# Patient Record
Sex: FEMALE | Race: ASIAN | NOT HISPANIC OR LATINO | Employment: UNEMPLOYED | URBAN - METROPOLITAN AREA
[De-identification: names, ages, dates, MRNs, and addresses within clinical notes are randomized per-mention and may not be internally consistent; named-entity substitution may affect disease eponyms.]

---

## 2018-05-07 ENCOUNTER — TRANSFERRED RECORDS (OUTPATIENT)
Dept: HEALTH INFORMATION MANAGEMENT | Facility: CLINIC | Age: 38
End: 2018-05-07

## 2018-10-10 ENCOUNTER — TRANSFERRED RECORDS (OUTPATIENT)
Dept: HEALTH INFORMATION MANAGEMENT | Facility: CLINIC | Age: 38
End: 2018-10-10

## 2018-10-31 ENCOUNTER — HEALTH MAINTENANCE LETTER (OUTPATIENT)
Age: 38
End: 2018-10-31

## 2018-10-31 ENCOUNTER — OFFICE VISIT (OUTPATIENT)
Dept: FAMILY MEDICINE | Facility: CLINIC | Age: 38
End: 2018-10-31
Payer: COMMERCIAL

## 2018-10-31 VITALS
BODY MASS INDEX: 31.72 KG/M2 | HEART RATE: 66 BPM | OXYGEN SATURATION: 99 % | HEIGHT: 64 IN | WEIGHT: 185.8 LBS | TEMPERATURE: 97.6 F | DIASTOLIC BLOOD PRESSURE: 79 MMHG | SYSTOLIC BLOOD PRESSURE: 118 MMHG | RESPIRATION RATE: 20 BRPM

## 2018-10-31 DIAGNOSIS — Z3A.08 8 WEEKS GESTATION OF PREGNANCY: Primary | ICD-10-CM

## 2018-10-31 DIAGNOSIS — G43.709 CHRONIC MIGRAINE WITHOUT AURA WITHOUT STATUS MIGRAINOSUS, NOT INTRACTABLE: Chronic | ICD-10-CM

## 2018-10-31 DIAGNOSIS — O09.521 ELDERLY MULTIGRAVIDA IN FIRST TRIMESTER: ICD-10-CM

## 2018-10-31 DIAGNOSIS — H54.61 DECREASED VISION OF RIGHT EYE: Chronic | ICD-10-CM

## 2018-10-31 LAB — HCG UR QL: POSITIVE

## 2018-10-31 RX ORDER — PRENATAL VIT/IRON FUM/FOLIC AC 27MG-0.8MG
1 TABLET ORAL DAILY
Qty: 100 TABLET | Refills: 3 | Status: SHIPPED | OUTPATIENT
Start: 2018-10-31 | End: 2018-11-21

## 2018-10-31 NOTE — PROGRESS NOTES
"       MARKO Ward Say is a 38 year old female with a significant past medical history of migraine who presents for the new concern(s) of:    Pregnancy Test, New to Clinic/Establish Care:   \"I am feeling like I am pregnant. I had no period this month, and I am very tired and nauseous. This feels like the last time I was pregnant.\" She is sexually active with a male. She does not use any birth control. Back in [place] took pills and shot, but was bleeding too much so since coming [place], she has not used any contraceptive. She is not trying to get pregnant, but states that she knew it was a possibility and is willing to keep the pregnancy if pregnant.     ROS: Denies vaginal bleeding, pelvic or abdominal pain, or excessive nausea/vomiting precluding oral intake.     She is unsure of the FDLMP, but guesses that her last menstruation took place over the first week of September.   Best guess is that FDLMP was 8.5 weeks ago, which might put her at 5 weeks gestation.     Obstetric:  one spontaneous  at 2 months \"because I was lifting too hard\".   Oldest child is 12 years, youngest is 5 years. All deliveries were . No . One labor took a long time, but no deliveries required vacuum- or surgical assistance. Oldest baby had in a [facility], and next three babies[place], all with very little/no anesthesia. No complications with any of her pregnancies that went to term and no post-partum complications.     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     SocH:  No tobacco, no alcohol.     FamH:   4 children are alive and well.     All:  None    Meds:   Medications for migraine 1 everyday and 1 as needed.     She completed EREN so we could learn her medical history and medication list from the [facility].     A Nia  was used for this visit.       Patient Active Problem List   Diagnosis " "    Chronic migraine without aura without status migrainosus, not intractable     Decreased vision of right eye     Elderly multigravida in first trimester     Current Outpatient Prescriptions   Medication Sig Dispense Refill     Prenatal Vit-Fe Fumarate-FA (PRENATAL MULTIVITAMIN PLUS IRON) 27-0.8 MG TABS per tablet Take 1 tablet by mouth daily 100 tablet 3      No Known Allergies    Results for orders placed or performed in visit on 10/31/18 (from the past 24 hour(s))   HCG Qualitative Urine (UPT)  ([facility])   Result Value Ref Range    HCG Qual Urine POSITIVE Negative          Review of Systems:   CONSTITUTIONAL: fatigue, no unexpected change in weight  SKIN: no worrisome rashes, no worrisome lesions  EYES: no acute vision problems or changes  ENT: no ear problems, no mouth problems, no throat problems  RESP: no significant cough, no shortness of breath  CV: no chest pain, no palpitations, no new or worsening peripheral edema  GI: nausea, no vomiting, no constipation, no diarrhea, occasional midline ache similar to prior pregnancies  : no frequency, no dysuria, no hematuria  NEURO: no weakness, no dizziness, no syncope, no recent migraines  ENDOCRINE: no temperature intolerance, no skin/hair changes  PSYCHIATRIC: NEGATIVE for changes in mood or affect            Physical Exam:     Vitals:    10/31/18 1507   BP: 118/79   BP Location: Left arm   Patient Position: Sitting   Cuff Size: Adult Regular   Pulse: 66   Resp: 20   Temp: 97.6  F (36.4  C)   TempSrc: Oral   SpO2: 99%   Weight: 185 lb 12.8 oz (84.3 kg)   Height: 5' 3.5\" (161.3 cm)     Body mass index is 32.4 kg/(m^2).    GENERAL: healthy, alert and no distress  EYES: Eyes grossly normal to inspection, no icterus  RESP: lungs clear to auscultation - no rales, no rhonchi, no wheezes  CV: regular rates and rhythm, normal S1 S2, no S3 or S4 and no murmur, no click or rub -  ABDOMEN: soft, no tenderness, no  hepatosplenomegaly, no masses, normal bowel sounds  MS: " extremities- no gross deformities noted, no edema  SKIN: no suspicious lesions, no rashes  NEURO: strength and tone- normal, sensory exam- grossly normal, mentation- intact, speech- normal, reflexes- symmetric  BACK: no CVA tenderness, no paralumbar tenderness    Assessment and Plan      Ed was seen today for establish care, pregnancy test, medication reconciliation and anxiety.    Diagnoses and all orders for this visit:    8 weeks gestation of pregnancy  -     HCG Qualitative Urine (UPT)  ([facility])  -     Prenatal Vit-Fe Fumarate-FA (PRENATAL MULTIVITAMIN PLUS IRON) 27-0.8 MG TABS per tablet; Take 1 tablet by mouth daily    Elderly multigravida in first trimester    Chronic migraine without aura without status migrainosus, not intractable    Decreased vision of right eye      Pregnant without acute complications: denies bleeding, pain, nausea/vomiting precluding intake. Feeling otherwise well.     FDLMP very uncertain. Anticipate TVUS, consider quantitative beta hCG to make sure TVUS is not too early.     Advanced maternal age. Anticipate quad screen discussion.     Awaiting EREN from [facility] to advise Ed Say on if her migraine medications are okay in pregnancy. Told her not to take naproxen or ibuprofen but Tylenol is okay. She will send pictures to pills to , who will inform the clinic.     Return to clinic in 1 week for 1st OB visit.     Options for treatment and follow-up care were reviewed with the patient and/or guardian. Moo Say and/or guardian engaged in the decision making process and verbalized understanding of the options discussed and agreed with the final plan.  This patient was seen and discussed with supervising physician Dr. Darryl Daniels.   Moreno Mckeon DO

## 2018-10-31 NOTE — MR AVS SNAPSHOT
"              After Visit Summary   10/31/2018    Moo Say    MRN: [id number]           Patient Information     Date Of Birth          1980        Visit Information        Provider Department      10/31/2018 3:10 PM Moreno Mckeon MD [facility]        Today's Diagnoses     8 weeks gestation of pregnancy    -  1       Follow-ups after your visit        Follow-up notes from your care team     Return in about 1 week (around 2018) for 1st ob visit.      Who to contact     Please call your clinic at [phone] to:    Ask questions about your health    Make or cancel appointments    Discuss your medicines    Learn about your test results    Speak to your doctor            Additional Information About Your Visit        MyChart Information     [employer] is an electronic gateway that provides easy, online access to your medical records. With Bahu, you can request a clinic appointment, read your test results, renew a prescription or communicate with your care team.     To sign up for [employer] visit the website at [email or web address]   You will be asked to enter the access code listed below, as well as some personal information. Please follow the directions to create your username and password.     Your access code is: [email or web address]  Expires: 2019  3:46 PM     Your access code will  in 90 days. If you need help or a new code, please contact your [facility] or call [phone] for assistance.        Care EveryWhere ID     This is your Care EveryWhere ID. This could be used by other organizations to access your [facility] medical records  [id number]        Your Vitals Were     Pulse Temperature Respirations Height Last Period Pulse Oximetry    66 97.6  F (36.4  C) (Oral) 20 5' 3.5\" (161.3 cm) 2018 (Within Days) 99%    BMI (Body Mass Index)                   32.4 kg/m2            Blood Pressure from Last 3 Encounters:   10/31/18 118/79    Weight from " Last 3 Encounters:   10/31/18 185 lb 12.8 oz (84.3 kg)              We Performed the Following     HCG Qualitative Urine (UPT)  ([facility])          Today's Medication Changes          These changes are accurate as of 10/31/18  3:46 PM.  If you have any questions, ask your nurse or doctor.               Start taking these medicines.        Dose/Directions    prenatal multivitamin plus iron 27-0.8 MG Tabs per tablet   Used for:  8 weeks gestation of pregnancy   Started by:  Moreno Mckeon MD        Dose:  1 tablet   Take 1 tablet by mouth daily   Quantity:  100 tablet   Refills:  3            Where to get your medicines      [facility] - [place], [place] - [address], [place] [address]     Phone:  [phone]     prenatal multivitamin plus iron 27-0.8 MG Tabs per tablet                Primary Care Provider Office Phone #    Moreno Mckeon [phone]       [facility] [address] [place] [address]        Equal Access to Services     [facility]FF[facility]: Hadii tracy ku hadasho Soomaali, waaxda luqadaha, qaybta kaalmada adeegyada, waxay heikein sukh lomeli . So [facility] [phone].    ATENCIÓN: Si habla español, tiene a white disposición servicios gratuitos de asistencia lingüística. Llame al [phone].    We comply with applicable federal civil rights laws and [place] laws. We do not discriminate on the basis of race, color, national origin, age, disability, sex, sexual orientation, or gender identity.            Thank you!     Thank you for choosing [facility]  for your care. Our goal is always to provide you with excellent care. Hearing back from our patients is one way we can continue to improve our services. Please take a few minutes to complete the written survey that you may receive in the mail after your visit with us. Thank you!             Your Updated Medication List - Protect others around you:  Learn how to safely use, store and throw away your medicines at www.disposemymeds.org.          This list is accurate as of 10/31/18  3:46 PM.  Always use your most recent med list.                   Brand Name Dispense Instructions for use Diagnosis    prenatal multivitamin plus iron 27-0.8 MG Tabs per tablet     100 tablet    Take 1 tablet by mouth daily    8 weeks gestation of pregnancy

## 2018-10-31 NOTE — NURSING NOTE
Due to patient being non-English speaking/uses sign language, an  was used for this visit. Only for face-to-face interpretation by an external agency, date and length of interpretation can be found on the scanned worksheet.       name: Mau Verduzco  Language: Nia  Agency:  [employer]  Telephone number: [phone]  Type of interpretation:  Face-to-face, spoken

## 2018-11-01 NOTE — PROGRESS NOTES
Preceptor Attestation:   Patient seen, evaluated and discussed with the resident. I have verified the content of the note, which accurately reflects my assessment of the patient and the plan of care.   Supervising Physician:  Darryl Daniels MD

## 2018-11-13 ENCOUNTER — OFFICE VISIT (OUTPATIENT)
Dept: FAMILY MEDICINE | Facility: CLINIC | Age: 38
End: 2018-11-13
Payer: COMMERCIAL

## 2018-11-13 VITALS
TEMPERATURE: 98.9 F | OXYGEN SATURATION: 98 % | SYSTOLIC BLOOD PRESSURE: 112 MMHG | RESPIRATION RATE: 16 BRPM | DIASTOLIC BLOOD PRESSURE: 74 MMHG | HEART RATE: 66 BPM

## 2018-11-13 DIAGNOSIS — Z34.91 FIRST TRIMESTER PREGNANCY: ICD-10-CM

## 2018-11-13 DIAGNOSIS — G44.219 EPISODIC TENSION-TYPE HEADACHE, NOT INTRACTABLE: Primary | ICD-10-CM

## 2018-11-13 DIAGNOSIS — O21.9 NAUSEA/VOMITING IN PREGNANCY: ICD-10-CM

## 2018-11-13 PROBLEM — Q12.0: Status: ACTIVE | Noted: 2018-05-25

## 2018-11-13 PROBLEM — H44.511 ABSOLUTE GLAUCOMA, RIGHT EYE: Status: ACTIVE | Noted: 2018-05-25

## 2018-11-13 PROBLEM — Q11.2: Status: ACTIVE | Noted: 2018-05-25

## 2018-11-13 PROBLEM — J84.10 LUNG GRANULOMA (H): Status: ACTIVE | Noted: 2018-05-07

## 2018-11-13 PROBLEM — H54.40 BLIND RIGHT EYE: Status: ACTIVE | Noted: 2018-05-07

## 2018-11-13 RX ORDER — TIMOLOL MALEATE 5 MG/ML
1 SOLUTION/ DROPS OPHTHALMIC
COMMUNITY
Start: 2018-05-25 | End: 2019-06-06

## 2018-11-13 RX ORDER — ACETAMINOPHEN 325 MG/1
650 TABLET ORAL EVERY 6 HOURS PRN
Qty: 100 TABLET | Refills: 3 | Status: SHIPPED | OUTPATIENT
Start: 2018-11-13 | End: 2019-01-15

## 2018-11-13 RX ORDER — IBUPROFEN 600 MG/1
600 TABLET, FILM COATED ORAL
COMMUNITY
Start: 2018-09-13 | End: 2018-11-13

## 2018-11-13 RX ORDER — PYRIDOXINE HCL (VITAMIN B6) 25 MG
25 TABLET ORAL DAILY
Qty: 90 TABLET | Refills: 0 | Status: SHIPPED | OUTPATIENT
Start: 2018-11-13 | End: 2018-11-21

## 2018-11-13 RX ORDER — NORTRIPTYLINE HCL 10 MG
10 CAPSULE ORAL
COMMUNITY
Start: 2018-09-13 | End: 2018-11-13

## 2018-11-13 NOTE — NURSING NOTE
Due to patient being non-English speaking/uses sign language, an  was used for this visit. Only for face-to-face interpretation by an external agency, date and length of interpretation can be found on the scanned worksheet.     name:  Mau Verduzco  Agency: [employer]  Language: Nia  Telephone number: [phone]  Type of interpretation: Face-to-face, Spoken

## 2018-11-13 NOTE — PROGRESS NOTES
SUBJECTIVE     Chief Complaint   Patient presents with     Cough     Pt is here for a cough.  She states she has had it for two weeks now.     Medication Reconciliation     Complete.        Subjective: Ed Rangel is a 38 year old female with PMH including  who is approx 9w pregnant by unsure LMP here for forms, but forgot forms at home..    Coughing x 2 weeks, worse at night. No shortness of breath or chest pain. No fevers. +nasal congestion. No sick contacts. No diarrhea.     Does have constant headaches. Has been going on since she was a child, slowly getting worse over time. Usually every day or every other day. If doesn't take meds, lasts all day. Sleeping better. No weakness. No other herbal medications. Has been seen by [facility] (records reviewed) and was started on nortriptyline and ibuprofen, but hasn't taken these since she's been pregnant. +chronic R eye blindness, unchanged since the war.     Feels nausea and throwing up. This has been present since she's been pregnant. This has been present for her other pregnancies. Eating OK and drinking OK. Currently throwing up about 3x/day.       PMH/PSH, FamHx, Meds, Allergies reviewed and updated as needed.    Social History     Social History     Marital status:      Spouse name: N/A     Number of children: N/A     Years of education: N/A     Social History Main Topics     Smoking status: Never Smoker     Smokeless tobacco: Never Used     Alcohol use None     Drug use: None     Sexual activity: Not Asked     Other Topics Concern     None     Social History Narrative    Lived in [facility] [place]. Gave birth to first baby in [facility], gave birth [place] next [place] babies [place].                OBJECTIVE     /74 (BP Location: Left arm, Patient Position: Sitting, Cuff Size: Adult Large)  Pulse 66  Temp 98.9  F (37.2  C) (Oral)  Resp 16  LMP 2018 (Within Days)  SpO2 98%  There is no height or weight on file  to calculate BMI.    Physical exam:  Gen: Appears tired, coughing frequently  HEENT: No cervical lymphadenopathy. No tonsillar swelling, mild oropharyngeal erythema, no oral lesions or exudates.   CV: Regular rate and rhythm, no murmurs/rubs/gallops  Resp: Clear to auscultation bilaterally, no crackles or wheezes  Neuro: Symmetric smile. R eye is non-reactive to light, but L eye reacts appropriately to light. 5/5 strength to arm flexion/extension bilaterally. Sensation intact across distal extremities. Normal gait.  Skin: no suspicious lesions or rashes  Psych: Mood and affect appropriate, mentation appears normal.    No results found for this or any previous visit (from the past 24 hour(s)).      ASSESSMENT AND PLAN     Ed Rangel is a 38 year old female here for multiple concerns    1. Episodic tension-type headache, not intractable  Chronic. No neuro deficits. Counseled to discontinue ibuprofen and nortriptyline in pregnancy. Will try APAP as needed  - acetaminophen (TYLENOL) 325 MG tablet; Take 2 tablets (650 mg) by mouth every 6 hours as needed for mild pain  Dispense: 100 tablet; Refill: 3    2. Nausea/vomiting in pregnancy  - pyridOXINE (VITAMIN B-6) 25 MG tablet; Take 1 tablet (25 mg) by mouth daily  Dispense: 90 tablet; Refill: 0  - doxylamine (UNISOM) 25 MG TABS tablet; Take 0.5-1 tablets (12.5-25 mg) by mouth nightly as needed (nausea)  Dispense: 14 each; Refill: 0    3. First trimester pregnancy  Dating unclear so as she is approx 9 weeks along now will schedule US  - US OB <14 WKS SINGLE OR FIRST GESTATION; Future    4. Cough  Likely viral URI. Vitals stable with normal HEENT/lung exam. Recommended honey and symptomatic cares.      RTC in 1-2 weeks for NOIGNACIO Kaur MD, PGY-3  I precepted today with Beatriz Cee MD.

## 2018-11-13 NOTE — PROGRESS NOTES
Preceptor Attestation:   Patient seen, evaluated and discussed with the resident. I have verified the content of the note, which accurately reflects my assessment of the patient and the plan of care.   Supervising Physician:  Puja Cee MD

## 2018-11-13 NOTE — PATIENT INSTRUCTIONS
1. Episodic tension-type headache, not intractable  - acetaminophen (TYLENOL) 325 MG tablet; Take 2 tablets (650 mg) by mouth every 6 hours as needed for mild pain  Dispense: 100 tablet; Refill: 3    2. Nausea/vomiting in pregnancy  - pyridOXINE (VITAMIN B-6) 25 MG tablet; Take 1 tablet (25 mg) by mouth daily  Dispense: 90 tablet; Refill: 0  - doxylamine (UNISOM) 25 MG TABS tablet; Take 0.5-1 tablets (12.5-25 mg) by mouth nightly as needed (nausea)  Dispense: 14 each; Refill: 0    3. First trimester pregnancy  - US OB <14 WKS SINGLE OR FIRST GESTATION; Future

## 2018-11-13 NOTE — MR AVS SNAPSHOT
After Visit Summary   11/13/2018    Ed Say    MRN: [id number]           Patient Information     Date Of Birth          1980        Visit Information        Provider Department      11/13/2018 10:20 AM Beatriz Kaur MD [facility]        Today's Diagnoses     Episodic tension-type headache, not intractable    -  1    Nausea/vomiting in pregnancy        First trimester pregnancy          Care Instructions    1. Episodic tension-type headache, not intractable  - acetaminophen (TYLENOL) 325 MG tablet; Take 2 tablets (650 mg) by mouth every 6 hours as needed for mild pain  Dispense: 100 tablet; Refill: 3    2. Nausea/vomiting in pregnancy  - pyridOXINE (VITAMIN B-6) 25 MG tablet; Take 1 tablet (25 mg) by mouth daily  Dispense: 90 tablet; Refill: 0  - doxylamine (UNISOM) 25 MG TABS tablet; Take 0.5-1 tablets (12.5-25 mg) by mouth nightly as needed (nausea)  Dispense: 14 each; Refill: 0    3. First trimester pregnancy  - US OB <14 WKS SINGLE OR FIRST GESTATION; Future                Follow-ups after your visit        Your next 10 appointments already scheduled     Nov 21, 2018  8:40 AM CST   Education with [facility] Ob Educator   [facility] ([facility])    [address] [place] [id number] [phone]            Nov 21, 2018 10:00 AM CST   NEW OB with Azul Olivares MD   [facility] ([facility])    [address] [place] [id number]   [phone]              Future tests that were ordered for you today     Open Future Orders        Priority Expected Expires Ordered    US OB <14 WKS SINGLE OR FIRST GESTATION Routine  11/13/2019 11/13/2018            Who to contact     Please call your clinic at [phone] to:    Ask questions about your health    Make or cancel appointments    Discuss your medicines    Learn about your test results    Speak to your doctor            Additional Information About Your Visit        MyChart Information     [employer] is an electronic  gateway that provides easy, online access to your medical records. With [employer], you can request a clinic appointment, read your test results, renew a prescription or communicate with your care team.     To sign up for [employer] visit the website at [email or web address]   You will be asked to enter the access code listed below, as well as some personal information. Please follow the directions to create your username and password.     Your access code is: [email or web address]  Expires: 2019  2:46 PM     Your access code will  in 90 days. If you need help or a new code, please contact your [facility] or call [phone] for assistance.        Care EveryWhere ID     This is your Care EveryWhere ID. This could be used by other organizations to access your [facility] medical records  [id number]        Your Vitals Were     Pulse Temperature Respirations Last Period Pulse Oximetry       66 98.9  F (37.2  C) (Oral) 16 2018 (Within Days) 98%        Blood Pressure from Last 3 Encounters:   18 112/74   10/31/18 118/79    Weight from Last 3 Encounters:   10/31/18 185 lb 12.8 oz (84.3 kg)                 Today's Medication Changes          These changes are accurate as of 18 11:07 AM.  If you have any questions, ask your nurse or doctor.               Start taking these medicines.        Dose/Directions    acetaminophen 325 MG tablet   Commonly known as:  TYLENOL   Used for:  Episodic tension-type headache, not intractable   Started by:  Beatriz Kaur MD        Dose:  650 mg   Take 2 tablets (650 mg) by mouth every 6 hours as needed for mild pain   Quantity:  100 tablet   Refills:  3       doxylamine 25 MG Tabs tablet   Commonly known as:  UNISOM   Used for:  Nausea/vomiting in pregnancy   Started by:  Beatriz Kaur MD        Dose:  12.5-25 mg   Take 0.5-1 tablets (12.5-25 mg) by mouth nightly as needed (nausea)   Quantity:  14 each   Refills:  0        pyridOXINE 25 MG tablet   Commonly known as:  vitamin B-6   Used for:  Nausea/vomiting in pregnancy   Started by:  Beatriz Kaur MD        Dose:  25 mg   Take 1 tablet (25 mg) by mouth daily   Quantity:  90 tablet   Refills:  0         Stop taking these medicines if you haven't already. Please contact your care team if you have questions.     ibuprofen 600 MG tablet   Commonly known as:  ADVIL/MOTRIN   Stopped by:  Beatriz Kaur MD           nortriptyline 10 MG capsule   Commonly known as:  PAMELOR   Stopped by:  Beatriz Kaur MD                Where to get your medicines      These medications were sent to [facility] - [place], [place] - [address], [place] [address]     Phone:  [phone]     acetaminophen 325 MG tablet    doxylamine 25 MG Tabs tablet    pyridOXINE 25 MG tablet                Primary Care Provider Office Phone # Fax #    Azul Olivares [phone]       [address] [place] [address]        Equal Access to Services     [facility]: Hadii aad ku hadasho Soomaali, waaxda luqadaha, qaybta kaalmada adeegyada, waxay idiin haymaria rn suze lomeli . So [facility] [phone].    ATENCIÓN: Si habla español, tiene a white disposición servicios gratuitos de asistencia lingüística. [facility] [phone].    We comply with applicable federal civil rights laws and [place] laws. We do not discriminate on the basis of race, color, national origin, age, disability, sex, sexual orientation, or gender identity.            Thank you!     Thank you for choosing [facility]  for your care. Our goal is always to provide you with excellent care. Hearing back from our patients is one way we can continue to improve our services. Please take a few minutes to complete the written survey that you may receive in the mail after your visit with us. Thank you!             Your Updated Medication List - Protect others around you: Learn how to  safely use, store and throw away your medicines at www.disposemymeds.org.          This list is accurate as of 11/13/18 11:07 AM.  Always use your most recent med list.                   Brand Name Dispense Instructions for use Diagnosis    acetaminophen 325 MG tablet    TYLENOL    100 tablet    Take 2 tablets (650 mg) by mouth every 6 hours as needed for mild pain    Episodic tension-type headache, not intractable       doxylamine 25 MG Tabs tablet    UNISOM    14 each    Take 0.5-1 tablets (12.5-25 mg) by mouth nightly as needed (nausea)    Nausea/vomiting in pregnancy       prenatal multivitamin plus iron 27-0.8 MG Tabs per tablet     100 tablet    Take 1 tablet by mouth daily    8 weeks gestation of pregnancy       pyridOXINE 25 MG tablet    vitamin B-6    90 tablet    Take 1 tablet (25 mg) by mouth daily    Nausea/vomiting in pregnancy       timolol 0.5 % ophthalmic solution    TIMOPTIC     1 drop

## 2018-11-19 DIAGNOSIS — Z34.91 FIRST TRIMESTER PREGNANCY: ICD-10-CM

## 2018-11-19 NOTE — PROGRESS NOTES
Due to patient being non-English speaking/uses sign language, an  was used for this visit. Only for face-to-face interpretation by an external agency, date and length of interpretation can be found on the scanned worksheet.     name: Calvin Centeno  Agency: [employer]  Language: Nia   Telephone number: [phone]  Type of interpretation: Face-to-face, spoken

## 2018-11-21 ENCOUNTER — OFFICE VISIT (OUTPATIENT)
Dept: FAMILY MEDICINE | Facility: CLINIC | Age: 38
End: 2018-11-21
Payer: COMMERCIAL

## 2018-11-21 ENCOUNTER — ALLIED HEALTH/NURSE VISIT (OUTPATIENT)
Dept: FAMILY MEDICINE | Facility: CLINIC | Age: 38
End: 2018-11-21
Payer: COMMERCIAL

## 2018-11-21 VITALS
RESPIRATION RATE: 18 BRPM | TEMPERATURE: 97.8 F | SYSTOLIC BLOOD PRESSURE: 118 MMHG | DIASTOLIC BLOOD PRESSURE: 84 MMHG | HEART RATE: 69 BPM | HEIGHT: 64 IN | WEIGHT: 187.6 LBS | BODY MASS INDEX: 32.03 KG/M2

## 2018-11-21 DIAGNOSIS — O21.0 HYPEREMESIS GRAVIDARUM: ICD-10-CM

## 2018-11-21 DIAGNOSIS — O09.529 AMA (ADVANCED MATERNAL AGE) MULTIGRAVIDA 35+: ICD-10-CM

## 2018-11-21 DIAGNOSIS — O09.90 HIGH RISK PREGNANCY, ANTEPARTUM: Primary | ICD-10-CM

## 2018-11-21 DIAGNOSIS — O21.9 NAUSEA/VOMITING IN PREGNANCY: ICD-10-CM

## 2018-11-21 DIAGNOSIS — H54.7 DECREASED VISION: ICD-10-CM

## 2018-11-21 DIAGNOSIS — K03.6 BETEL DEPOSIT ON TEETH: ICD-10-CM

## 2018-11-21 DIAGNOSIS — O09.529 SUPERVISION OF HIGH-RISK PREGNANCY OF ELDERLY MULTIGRAVIDA: ICD-10-CM

## 2018-11-21 DIAGNOSIS — Z3A.08 8 WEEKS GESTATION OF PREGNANCY: ICD-10-CM

## 2018-11-21 DIAGNOSIS — R10.11 RUQ ABDOMINAL PAIN: ICD-10-CM

## 2018-11-21 DIAGNOSIS — Z60.3 LANGUAGE BARRIER, CULTURAL DIFFERENCES: ICD-10-CM

## 2018-11-21 LAB
ALBUMIN SERPL BCP-MCNC: 3.3 G/DL (ref 3.5–5)
ALP SERPL-CCNC: 62 U/L (ref 45–120)
ALT SERPL W/O P-5'-P-CCNC: 50 U/L (ref 0–45)
AST SERPL-CCNC: 32 U/L (ref 0–40)
BILIRUB DIRECT SERPL-MCNC: 0.1 MG/DL (ref 0–0.5)
BILIRUB SERPL-MCNC: 0.4 MG/DL (ref 0–1)
BILIRUBIN UR: NEGATIVE
BLOOD UR: NEGATIVE
COLLECTION METHOD: NORMAL
GLUCOSE URINE: NEGATIVE
HBA1C MFR BLD: 5.1 % (ref 4.1–5.7)
HCT VFR BLD AUTO: 41.9 % (ref 35–47)
HEMOGLOBIN: 13.1 G/DL (ref 11.7–15.7)
HIV 1+2 AB+HIV1 P24 AG SERPL QL IA: NEGATIVE
KETONES UR QL: NEGATIVE
LEAD BLD-MCNC: NORMAL UG/DL
LEAD RETEST: NO
LEUKOCYTE ESTERASE UR: NEGATIVE
MCH RBC QN AUTO: 31.4 PG (ref 26.5–35)
MCHC RBC AUTO-ENTMCNC: 31.3 G/DL (ref 32–36)
MCV RBC AUTO: 100.5 FL (ref 78–100)
NITRITE UR QL STRIP: NEGATIVE
PH UR STRIP: 6.5 [PH] (ref 5–7)
PLATELET # BLD AUTO: 266 K/UL (ref 150–450)
PROT SERPL-MCNC: 7.6 G/DL (ref 6–8)
PROTEIN UR: ABNORMAL
RBC # BLD AUTO: 4.2 M/UL (ref 3.8–5.2)
SP GR UR STRIP: >=1.03
UROBILINOGEN UR STRIP-ACNC: ABNORMAL
WBC # BLD AUTO: 11.4 K/UL (ref 4–11)

## 2018-11-21 RX ORDER — PYRIDOXINE HCL (VITAMIN B6) 25 MG
25 TABLET ORAL DAILY
Qty: 90 TABLET | Refills: 0 | Status: SHIPPED | OUTPATIENT
Start: 2018-11-21 | End: 2019-01-15

## 2018-11-21 RX ORDER — MECLIZINE HYDROCHLORIDE 25 MG/1
25 TABLET ORAL EVERY 6 HOURS PRN
Qty: 30 TABLET | Refills: 1 | Status: SHIPPED | OUTPATIENT
Start: 2018-11-21 | End: 2019-01-03

## 2018-11-21 RX ORDER — PRENATAL VIT/IRON FUM/FOLIC AC 27MG-0.8MG
1 TABLET ORAL DAILY
Qty: 100 TABLET | Refills: 3 | Status: SHIPPED | OUTPATIENT
Start: 2018-11-21 | End: 2019-02-04

## 2018-11-21 SDOH — SOCIAL STABILITY - SOCIAL INSECURITY: ACCULTURATION DIFFICULTY: Z60.3

## 2018-11-21 NOTE — PROGRESS NOTES
Preceptor Attestation:   Patient seen, evaluated and discussed with the resident. I have verified the content of the note, which accurately reflects my assessment of the patient and the plan of care.   Supervising Physician:  Brandan Moreno MD

## 2018-11-21 NOTE — MR AVS SNAPSHOT
After Visit Summary   2018    Ed Say    MRN: [id number]           Patient Information     Date Of Birth          1980        Visit Information        Provider Department      2018 8:40 AM Educator, Jorje [facility]        Today's Diagnoses     Supervision of high-risk pregnancy of elderly multigravida        AMA (advanced maternal age) multigravida 35+        Language barrier, cultural differences        Betel deposit on teeth        Hyperemesis gravidarum           Follow-ups after your visit        Your next 10 appointments already scheduled     Dec 05, 2018  9:20 AM CST   NEW OB with Azul Olivares MD   [facility] ([facility])    [address] [place] [address]   [phone]              Who to contact     Please call your clinic at [phone] to:    Ask questions about your health    Make or cancel appointments    Discuss your medicines    Learn about your test results    Speak to your doctor            Additional Information About Your Visit        MyChart Information     [employer] is an electronic gateway that provides easy, online access to your medical records. With [employer], you can request a clinic appointment, read your test results, renew a prescription or communicate with your care team.     To sign up for [employer] visit the website at [email or web address]   You will be asked to enter the access code listed below, as well as some personal information. Please follow the directions to create your username and password.     Your access code is: [email or web address]  Expires: 2019  2:46 PM     Your access code will  in 90 days. If you need help or a new code, please contact your [facility] or call [phone] for assistance.        Care EveryWhere ID     This is your Care EveryWhere ID. This could be used by other organizations to access your [facility] medical records  [id number]        Your Vitals Were      Last Period                   09/07/2018 (Within Days)            Blood Pressure from Last 3 Encounters:   11/21/18 118/84   11/13/18 112/74   10/31/18 118/79    Weight from Last 3 Encounters:   11/21/18 187 lb 9.6 oz (85.1 kg)   10/31/18 185 lb 12.8 oz (84.3 kg)              We Performed the Following     AT RISK ANTEPARTUM MANAGEMENT     CARE COORDINATION          Today's Medication Changes          These changes are accurate as of 11/21/18 11:59 PM.  If you have any questions, ask your nurse or doctor.               Start taking these medicines.        Dose/Directions    meclizine 25 MG tablet   Commonly known as:  ANTIVERT   Used for:  High risk pregnancy, antepartum   Started by:  Azul Olivares MD        Dose:  25 mg   Take 1 tablet (25 mg) by mouth every 6 hours as needed for dizziness For vomiting in pregnancy   Quantity:  30 tablet   Refills:  1            Where to get your medicines      These medications were sent to [facility] - [place], [place] - [address]  [address], [place] [address]     Phone:  [phone]     doxylamine 25 MG Tabs tablet    meclizine 25 MG tablet    prenatal multivitamin plus iron 27-0.8 MG Tabs per tablet    pyridOXINE 25 MG tablet                Primary Care Provider Office Phone # Fax #    Azul Olivares [phone]       [address] [place] [address]        Equal Access to Services     ALLEN GIFFORD AH: Hadii tracy uriarte hadasho Somery, waaxda luqadaha, qaybta kaalmada adeegyada, deepika narayan. So [facility] [phone].    ATENCIÓN: Si habla español, tiene a white disposición servicios gratuitos de asistencia lingüística. Therese al [phone].    We comply with applicable federal civil rights laws and [place] laws. We do not discriminate on the basis of race, color, national origin, age, disability, sex, sexual orientation, or gender identity.            Thank you!     Thank you for choosing  [facility]  for your care. Our goal is always to provide you with excellent care. Hearing back from our patients is one way we can continue to improve our services. Please take a few minutes to complete the written survey that you may receive in the mail after your visit with us. Thank you!             Your Updated Medication List - Protect others around you: Learn how to safely use, store and throw away your medicines at www.disposemymeds.org.          This list is accurate as of 11/21/18 11:59 PM.  Always use your most recent med list.                   Brand Name Dispense Instructions for use Diagnosis    acetaminophen 325 MG tablet    TYLENOL    100 tablet    Take 2 tablets (650 mg) by mouth every 6 hours as needed for mild pain    Episodic tension-type headache, not intractable       doxylamine 25 MG Tabs tablet    UNISOM    30 each    Take 0.5-1 tablets (12.5-25 mg) by mouth nightly as needed (nausea)    Nausea/vomiting in pregnancy       meclizine 25 MG tablet    ANTIVERT    30 tablet    Take 1 tablet (25 mg) by mouth every 6 hours as needed for dizziness For vomiting in pregnancy    High risk pregnancy, antepartum       prenatal multivitamin plus iron 27-0.8 MG Tabs per tablet     100 tablet    Take 1 tablet by mouth daily    8 weeks gestation of pregnancy       pyridOXINE 25 MG tablet    vitamin B-6    90 tablet    Take 1 tablet (25 mg) by mouth daily    Nausea/vomiting in pregnancy       timolol 0.5 % ophthalmic solution    TIMOPTIC     1 drop

## 2018-11-21 NOTE — PROGRESS NOTES
First Obstetric Visit       MARKO Rangel is a 38 year old woman who presents for an initial prenatal visit at 9w6d weeks of pregnancy with HARJIT of 2019 by LMP of Patient's last menstrual period was 2018 (within days).. Early OB ultrasound completed at 9w4d which is not consistent with LMP, so US dating will be used.     She has not had bleeding since her LMP.   She has had nausea resulting in non-bilious and non-bloody vomiting 2-3 times per day.   Weight loss has not occurred.    This was not a planned pregnancy.     OTHER CONCERNS: Just moved from [facility] in . Feels that it has been lonely here and difficult since she doesn't speak English. Does have some family here. Her children are excited about the snow.     Has a chronic dry cough and was seen for this a couple of weeks ago. She has just been doing symptomatic cares, but it isn't improved. TB testing was negative.     Also experiencing RUQ pain intermittently, typically only after using acetaminophen for headaches. Has been taking acetaminophen as needed for headaches, but only rarely and stopped using with increased RUQ abdominal pain as it seemed to be associated to patient.     Have you travelled during the pregnancy?No  Have your sexual partner(s) travelled during the pregnancy?No              Labor Risk Assessment     Is the patient's age <18 or >40?     Yes   Patint's BMI is Body mass index is 32.2 kg/(m^2).   Does patient have a BMI < 18.5?     No  Prior delivery within 6 months?      No  Ever delivered prior to 37 weeks gestation?  No  Pregnancy occur via In Vitro Fertilization?   No  Are you carrying twins?       No    The patient has the following additional risk factors for  labor:   as documented above     Labor Risk Summary:  Pt is high risk for  Labor  No               Past Medical History     Past Medical History:   Diagnosis Date     Decreased vision of right eye     Since baby      "Migraine     Chronic, one controller and one as needed medications     Miscarriage 11/18/2016     See nurse history note, reviewed in detail.             Current Medications      Current Outpatient Prescriptions   Medication Sig Dispense Refill     doxylamine (UNISOM) 25 MG TABS tablet Take 0.5-1 tablets (12.5-25 mg) by mouth nightly as needed (nausea) 30 each 0     meclizine (ANTIVERT) 25 MG tablet Take 1 tablet (25 mg) by mouth every 6 hours as needed for dizziness For vomiting in pregnancy 30 tablet 1     Prenatal Vit-Fe Fumarate-FA (PRENATAL MULTIVITAMIN PLUS IRON) 27-0.8 MG TABS per tablet Take 1 tablet by mouth daily 100 tablet 3     pyridOXINE (VITAMIN B-6) 25 MG tablet Take 1 tablet (25 mg) by mouth daily 90 tablet 0     acetaminophen (TYLENOL) 325 MG tablet Take 2 tablets (650 mg) by mouth every 6 hours as needed for mild pain 100 tablet 3     timolol (TIMOPTIC) 0.5 % ophthalmic solution 1 drop               Review of Systems      ROS:  YES, history of migraines, right sided pain after taking acetaminophen - Headache  YES, blurry vision, blind in right eye - Changes in vision  No - Chest Pain  No - Shortness of Breath  YES, as described above - Nausea   YES, as described above - Vomiting  YES, right upper abdominal pain and reflux symptoms- Abdominal pain   No - Contractions  No - Dysuria   No - Vaginal Discharge    No - Vaginal bleeding   No - Loss of Fluid   No - Extremity swelling     ====================================================           Physical Exam      /84  Pulse 69  Temp 97.8  F (36.6  C)  Resp 18  Ht 5' 4\" (162.6 cm)  Wt 187 lb 9.6 oz (85.1 kg)  LMP 09/07/2018 (Within Days)  BMI 32.2 kg/m2  GENERAL: healthy, alert and no distress  NECK: no tenderness, no adenopathy, no asymmetry, no masses, no stiffness; thyroid- normal to palpation  RESP: lungs clear to auscultation - no rales, no rhonchi, no wheezes  CV: regular rates and rhythm, normal S1 S2, no S3 or S4 and no murmur, no " click or rub -  ABDOMEN: soft, no tenderness, no  hepatosplenomegaly, no masses, normal bowel sounds  MS: extremities- no gross deformities noted, no edema  SKIN: no suspicious lesions, no rashes  - female: cervix- normal, adnexae- normal; uterus- normal, no masses, no discharge  PSYCH: Alert and oriented times 3; coherent speech, normal rate and volume, able to articulate logical thoughts, able to abstract reason, no tangential thoughts, no hallucinations or delusions. Affect is normal.  No scars noted..   GENITALIA:  BUS WNL, no lesions noted   VAGINA:  Pink, normal rugae and discharge normal and physiologic  CERVIX:  smooth, without discharge or CMT and parous os,   firm/ closed  UTERUS: Midposition, nontender 9 weeks in size.  ADNEXA: Without masses or tenderness    Past labs reviewed:  Varicella immune unknown, ordered today  Hepatitis B immune No  Pap has never been completed.  She is due for this today.    =========================================         Assessment and Plan     Moo was seen today for prenatal care.    Diagnoses and all orders for this visit:    High risk pregnancy, antepartum  Pregnancy complicated by language barrier and AMA. Patient is interested in genetic testing and level 2 ultrasound by perinatology. Referral placed today.   -     ABO/Rh Type-HML ([facility])  -     Antibody Screen ([facility])  -     Chlamydia/Gono Amplified ([facility])  -     CBC with Plt ([facility])  -     Culture Urine [facility])  -     Hepatitis B Surface Ag ([facility])  -     HIV Ag/Ab Screen [place] ([facility])  -     Lead, Blood ([facility])  -     Rubella  IgG ([facility])  -     Syphilis Screen [place] ([facility])  -     Urinalysis([facility])  -     GYN Cytology ([facility])  -     Hepatitis B Surface Ab (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  -     OPHTHALMOLOGY ADULT REFERRAL; Future    Nausea/vomiting in pregnancy  Nausea and vomiting 2-3 times daily. Has been using doxylamine an pyridoxine daily as prescribed with limited improvement. Will try meclizine to see if this helps improves symptoms.   -     doxylamine (UNISOM) 25 MG TABS tablet; Take 0.5-1 tablets (12.5-25 mg) by mouth nightly as needed (nausea)  -     pyridOXINE (VITAMIN B-6) 25 MG tablet; Take 1 tablet (25 mg) by mouth daily  -     meclizine (ANTIVERT) 25 MG tablet; Take 1 tablet (25 mg) by mouth every 6 hours as needed for dizziness For vomiting in pregnancy    8 weeks gestation of pregnancy  Patient has been taking prenatal vitamins as prescribed. Refill ordered today.   -     Prenatal Vit-Fe Fumarate-FA (PRENATAL MULTIVITAMIN PLUS IRON) 27-0.8 MG TABS per tablet; Take 1 tablet by mouth daily    RUQ abdominal pain  Patient reports abdominal pain associated with acetaminophen use. States that she did stop using acetaminophen when she noticed that her pain was associated with use. Patient is not hepatitis B immune. Ordered hepatic profile and hepatitis B antigen today, but may also need additional hepatitis panel pending symptoms.   -     Hepatic Profile ([facility])        38 year old  , 9w6d weeks of pregnancy with HARJIT of 2019 by LMP of Patient's last menstrual period was 2018 (within days)..      Pregnancy Risk Assessment: High Risk pregnancy  Discussed high risk conditions as follows: Advanced maternal age, language barrier    -Dating US obtained and dating confirmed?   YES  -Taking PNV/Folate?     YES      - Ordered new OB labs: blood type and antibody screen, HIV, VDRL, hep B, rubella, UA/UC, gonorrhea/chlamydia, Pap smear, Hepatitis B immunity and Varicella immunity done today.    -Discussed risks and benefits of second trimester quad screen for trisomies between 15-20 weeks EGA, patient agreed. Will obtain when appropriate.   Patient with advanced  maternal age, reviewed increased risk of trisomies. Offered option of immediate chorionic villus sampling, amniocentesis or Verify testing through [facility] if desired.   - Discussed genetic screening. Patient does want to pursue screening.   - Discussed screening for sickle cell anemia. Patient does not  want to pursue Hb electrophoresis.     - Discussed early screening for gestational diabetes. The patient does have a history of GDM, BMI>30, h/o prediabetes/glucose intolerance, first degree relative with GDM or DM, or chronic HTN, so WILL obtain early GCT or A1c.    - The patient does not h/o severe, early preeclampsia with delivery <34weeks, preeclampsia in more than one pregnancy, pre-gestational diabetes, chronic hypertension, renal disease, or autoimmune disease so WILL NOT start low dose aspirin (81mg) and calcium supplementation (1g-1.5g/day elemental = 2500mg- 3750mg/day Calcium carbonate) to prevent preeclampsia.    - The patient  does not have a history of spontaneous  birth so  WILL NOT consider progesterone starting at 16-20 weeks and/or serial transvaginal cervical length ultrasounds from 16-24 weeks.     - Prenatal vitamins ordered.  - Flu shot offered and was Accepted.    Counseling given:   - Follow up in 2 weeks for return OB visit.  - Recommended weight gain for pregnancy: 11-20 lbs (pregravid BMI >30)  - Instructed on best evidence for: healthy diet and foods to avoid; exercise and activity during pregnancy; avoiding exposure to toxoplasmosis; safe use of seatbelts during pregnancy; and maintenance of a generally healthy lifestyle  -Patient to see OB educator/ RN today and/or next visit.    Discussed the harms, benefits, side effects and alternative therapies for current prescribed and OTC medications.      Options for treatment and follow-up care were reviewed with the patient and/or guardian. Moo Say and/or guardian engaged in the decision making process and verbalized understanding of the  options discussed and agreed with the final plan.    Azul Olivares MD, PGY1  [facility]    Patient discussed with Dr. Brandan Moreno who agrees with the assessment and plan.

## 2018-11-21 NOTE — NURSING NOTE
Due to patient being non-English speaking/uses sign language, an  was used for this visit. Only for face-to-face interpretation by an external agency, date and length of interpretation can be found on the scanned worksheet.     name: Per ID#[id number]  Agency: [employer]  Language: Nia   Telephone number:   Type of interpretation: Telemedicine, spoken

## 2018-11-21 NOTE — PROGRESS NOTES
Past Medical History     Do you have a history of any of the following medical conditions?    Condition No/Yes/Details   Hypertension No    Heart disease, mitral valve prolapse, rheumatic fever No    Asthma or another chronic lung disease No    An autoimmune disorder No    Kidney disease No    Frequent urinary tract infections No    Epilepsy, seizures, or spells  YES had seizures as a child   Migraine headaches  YES happens from time to time since was a child   Stroke, loss of sensation/function, seizures, or other neuro problem No    Diabetes No    Thyroid problems or have you taken thyroid medication No    Hepatitis, liver disease, jaundice No    Blood clots, phlebitis, pulmonary embolism or varicose veins No    Excessive bleeding after surgery or dental work No    Do you have more bleeding than other women after a cut or a scratch? No    Anemia  YES in the past during past pregnancies in [facility]   Blood transfusions No         Would you refuse a blood transfusion?       No   If yes, then ask next question. If no, skip next question.   Would you rather die than receive a blood transfusion? No    Breast problems No    Have you ever ?  YES plans to breast feed   Abnormalities of the uterus No    Abnormal pap smear No    Have you ever been treated for depression? No    Are you having problems with crying spells or loss of self-esteem?  YES in the past but not right now   Have you ever required psychiatric care? No    Have you been physically, sexually, or emotionally hurt by someone? No    Have you been in a major accident or suffered serious trauma? No    Have you ever had any gynecological surgical procedures such as cervical conization, LEEP, laser treatment, cryosurgery of the cervix, or a dilatation and curettage? No    Have you had any other surgical procedures? No         Have you ever had any complications from anesthesia? No    Have you ever been hospitalized for a nonsurgical reason?  YES  as a child hospitalized due to seizures in [facility]            Substance use and exposure     Does anyone in your home smoke? No    Do you use tobacco products or betel nut?  YES chews betel nut up to 2-3 x's per week   Do you drink beer, wine, or hard liquor? No    Do you use any of the following: marijuana, speed, cocaine, heroin, hallucinogens, or other drugs? No               Symptoms since Last Menstrual Period     Do you currently have any of the following symptoms: No/Yes/Details        Abdominal pain  YES rt side upper abd pain         Blood in the stools or urine No         Chest pain  YES center of chest for years 1-2 x's per year        Shortness of breath  YES happened once or twice when tired        coughing or vomiting up blood No         Your heart racing or skipping beats  YES feels when very tired/weak        Nausea or vomiting  YES vomiting 2-3 x's per day        Pain on urination No         Vaginal discharge or bleeding  YES vaginal discharge in the past               Genetic Screening          Is the patient 35 years or older?  YES Pt would like quad screen and referral to perinatologist  For Level 2 u/s     Do you have a history of any of the following No/Yes/Details        A metabolic disorder (e.g. Insulin-dependent DM, PKU) No         Recurrent pregnancy loss or still birth No      Do you, the baby's father, or anyone in your families have          Thalassemia AND MCV <80 No         Hemophilia No         Neural tube defect No }        Congenital heart defect No         Sickle cell disease or trait No         Muscular dystrophy No         Cystic fibrosis No         Mental retardation or autism No         Down's syndrome No         Aroldo-Sach's disease No         Pooler's chorea No         Any other inherited genetic or chromosomal disorder No         A child with birth defects not listed above No                Infection History     Ever treated for tuberculosis or had a positive skin  test No    Ever had genital herpes (or has your partner) No    Had a rash or viral illness since LMP No    Ever had a sexually transmitted infection No    Ever had chicken pox or the vaccine  YES as a child   Have you had a sexual partner who is HIV positive No    Ever had any other serious infectious disease No                Risk Assessment     Average Risk Category  No significant risk factors: Yes    At Risk Category (up to 3)  Teen pregnancy: No  Poor social situation: No  Domestic abuse: No  Financial difficulties: Yes  Smoker: No  H/O  deliver: No  H/O drug abuse: No  Non-English speaking: Yes  Advanced maternal age: Yes  GDM risks: Yes  Previous C/S: No  H/O PIH: No  H/O STIs: No  H/O mental health concerns: No  Onset care > 20 weeks: No      High Risk Category (4 or more At Risk or)  Diabetes/GDM: No  Multiple gestation: No  Chronic hypertension: No  Significant hx of asthma: No  Fetal demise > 20 weeks: No  Positive tox screen: No  Current mental health treatment: No      Risk: High Risk   Date determined: 18

## 2018-11-22 LAB
BLD GP AB SCN SERPL QL: NEGATIVE
CULTURE: NORMAL
HBV SURFACE AB SER-ACNC: POSITIVE M[IU]/ML
HBV SURFACE AG SERPL QL IA: NEGATIVE
RUBV IGG SERPL QL IA: POSITIVE
TREPONEMA ANTIBODY (SYPHILIS): NEGATIVE

## 2018-11-23 LAB
ABO + RH BLD: NORMAL
C TRACH RRNA CVX QL NAA+PROBE: NEGATIVE
FINAL DIAGNOSIS: NORMAL
HPV 16 DNA: NEGATIVE
HPV 18 DNA: NEGATIVE
HPV SOURCE: NORMAL
N GONORRHOEA RRNA SPEC QL NAA+PROBE: NEGATIVE
OTHER HR HPV: NEGATIVE
REPEAT ABO/RH TYPING (HML): NORMAL
SPECIMEN DESCRIPTION: NORMAL
VZV IGG SER QL IF: POSITIVE

## 2018-11-24 LAB — LEAD BLDV-MCNC: <2 UG/DL (ref 0–4.9)

## 2018-11-26 PROBLEM — Z60.3 LANGUAGE BARRIER, CULTURAL DIFFERENCES: Status: ACTIVE | Noted: 2018-11-26

## 2018-11-26 PROBLEM — K03.6 BETEL DEPOSIT ON TEETH: Status: ACTIVE | Noted: 2018-11-21

## 2018-11-26 PROBLEM — G43.709 CHRONIC MIGRAINE WITHOUT AURA WITHOUT STATUS MIGRAINOSUS, NOT INTRACTABLE: Chronic | Status: RESOLVED | Noted: 2018-10-31 | Resolved: 2018-11-26

## 2018-11-26 PROBLEM — O09.529 SUPERVISION OF HIGH-RISK PREGNANCY OF ELDERLY MULTIGRAVIDA: Status: ACTIVE | Noted: 2018-11-21

## 2018-11-26 PROBLEM — O09.521 ELDERLY MULTIGRAVIDA IN FIRST TRIMESTER: Status: RESOLVED | Noted: 2018-10-31 | Resolved: 2018-11-26

## 2018-11-26 PROBLEM — O21.0 HYPEREMESIS GRAVIDARUM: Status: ACTIVE | Noted: 2018-11-21

## 2018-11-26 NOTE — PATIENT INSTRUCTIONS
MAT FETAL MED CTR REFERRAL-PREGNANCY  [facility]  [facility]  [address] [facility], [address]  [place] [address]  Phone: [phone]  Internal Referral - called clinic and spoke with Pari to let them know to look for orders - verified received.   November 26, 2018 at 9:37 am [facility]    OPHTHALMOLOGY ADULT REFERRAL  November 26, 2018 at 2:35 pm Called [employer] for a Nia  Marcelina ID #[id number] - Mojennifer Say is available each day until 4:00 pm and does need a medical ride. Advised will call back with the details.     [place] [address]    Appointment:  Friday December 14, 2018  Arrival Time:  9:00 am  Provider:  Dr. Graham    A Nia  will be requested for you    Please bring a copy of your insurance card and photo ID    Your appointment will be between 90 minutes to 2 hours long    Your eyes will be dilated     If you wear contacts or glasses please bring these with you to your appointment     If you cannot make this appointment, please contact [phone] to reschedule    November 26, 2018 at 2:54 pm [facility] Summary for Trip Request with ID: [id number] [facility]  At 2:57 pm Called [employer] for a Nia  ID #[id number] - patient is aware - no additional questions at this time. Declined letter as she cannot read english. [facility]

## 2018-11-26 NOTE — NURSING NOTE
Family Medicine OB Education    I provided the following OB education to Ed Say.    Discussed that Dr Olivares should be the doctor that she sees for her prenatal visits and that Dr Olivares will try hard to be the one to deliver her baby.  Discussed that if Dr Olivares was unavailable that one of our other physicians would deliver the baby and that this could be a male or female provider.  Plan to provide further education at next visit when have  in clinic (had to use language line video  so difficult to get through everything in one visit).  See questionnaire and pregnancy risk assessment for further information in provider encounter.     Legal Screener completed and there were no concerns for government benefits, housing, or immigration.      Name of provider who requested the OB education: Dr Olivares  Name of provider on site (faculty or community preceptor) at the time of performing the OB education: Dr Kolb, RN BSN

## 2018-11-30 ENCOUNTER — RECORDS - HEALTHEAST (OUTPATIENT)
Dept: ADMINISTRATIVE | Facility: OTHER | Age: 38
End: 2018-11-30

## 2018-11-30 LAB
CYTOLOGY CVX/VAG DOC THIN PREP: NORMAL
HIGH RISK?: NO
HPV REFLEX?: NORMAL
LAB AP ABNORMAL BLEEDING: NO
LAB AP BIRTH CONTROL/HORMONES: NORMAL
LAB AP CASE REPORT: NORMAL
LAB AP CERVICAL APPEARANCE: NORMAL
LAB AP MALIGNANT?: NORMAL
LAB AP PATIENT STATUS: NORMAL
LAB AP PREVIOUS ABNL: NO
LAB AP PREVIOUS NORMAL: NORMAL
LAST MENS PERIOD: NORMAL
SPECIMEN ADEQUACY:: NORMAL

## 2018-12-04 ENCOUNTER — AMBULATORY - HEALTHEAST (OUTPATIENT)
Dept: MATERNAL FETAL MEDICINE | Facility: HOSPITAL | Age: 38
End: 2018-12-04

## 2018-12-04 DIAGNOSIS — Z34.90 PRENATAL CARE, ANTEPARTUM: ICD-10-CM

## 2018-12-05 ENCOUNTER — AMBULATORY - HEALTHEAST (OUTPATIENT)
Dept: MATERNAL FETAL MEDICINE | Facility: HOSPITAL | Age: 38
End: 2018-12-05

## 2018-12-05 ENCOUNTER — OFFICE VISIT (OUTPATIENT)
Dept: FAMILY MEDICINE | Facility: CLINIC | Age: 38
End: 2018-12-05
Payer: COMMERCIAL

## 2018-12-05 VITALS
SYSTOLIC BLOOD PRESSURE: 115 MMHG | RESPIRATION RATE: 16 BRPM | OXYGEN SATURATION: 98 % | WEIGHT: 190.6 LBS | DIASTOLIC BLOOD PRESSURE: 79 MMHG | TEMPERATURE: 98.5 F | HEART RATE: 71 BPM | BODY MASS INDEX: 32.72 KG/M2

## 2018-12-05 DIAGNOSIS — O09.529 SUPERVISION OF HIGH-RISK PREGNANCY OF ELDERLY MULTIGRAVIDA: Primary | ICD-10-CM

## 2018-12-05 DIAGNOSIS — O09.521 ELDERLY MULTIGRAVIDA IN FIRST TRIMESTER: ICD-10-CM

## 2018-12-05 DIAGNOSIS — R10.84 ABDOMINAL PAIN, GENERALIZED: ICD-10-CM

## 2018-12-05 RX ORDER — SIMETHICONE 80 MG
80 TABLET,CHEWABLE ORAL EVERY 6 HOURS PRN
Qty: 180 TABLET | Refills: 1 | Status: SHIPPED | OUTPATIENT
Start: 2018-12-05 | End: 2019-01-15

## 2018-12-05 NOTE — NURSING NOTE
Due to patient being non-English speaking/uses sign language, an  was used for this visit. Only for face-to-face interpretation by an external agency, date and length of interpretation can be found on the scanned worksheet.     name: Behzad Wen   Agency: [employer] Guard[employer]  Language: Nia   Telephone number: [phone]  Type of interpretation: Face-to-face, spoken

## 2018-12-05 NOTE — NURSING NOTE
Family Medicine OB Education    I provided the following OB education to Ed Say.    Sheet given and discussed warning signs with reasons to call clinic, L&D, or 24 hr HE  line with questions or concerns (phone numbers given).  Assisted pt with scheduling MFM appt, WIC appt, and APPLE in 4 weeks and set up rides.    Legal Screener completed and there were no concerns for government benefits, housing, or immigration.      Name of provider who requested the OB education: Dr Olivares  Name of provider on site (faculty or community preceptor) at the time of performing the OB education: Dr Renée Antony, RN BSN

## 2018-12-05 NOTE — PATIENT INSTRUCTIONS
-Simethicone ordered-can take as needed up to 4 times daily for gas pain   -Call and schedule appointment if abdominal pain doesn't improve of gets worse   -Follow up in 4 weeks    Maternal Fetal Medicine  [facility] [place] [id number]  [place] [address]  Phone: [phone]  APPT: Monday, 12/10/18 at 10:15 am  RIDE:[facility] will pick you up at 9:15 am to take you to the appointment    [facility]  [address] [place]  [address]  Phone: [phone]  APPT: Tuesday, 12/11/18 at 11:30 am  Ride: [facility] will pick you up at 10:30 am to take you to the appointment    OB check-up with Dr Olivares at [facility]  APPT: Thursday, 1/3/19 at 1:30 PM   RIDE: [facility] will pick you up at 12:45 pm to take you to the appt./[place]    Adapting to Pregnancy: First Trimester  As your body adjusts, you may have to change or limit your daily activities. You ll need more rest. You may also need to use the energy you have more wisely.  Your changing body  Almost every part of your body is affected as you adapt to pregnancy. The uterus and cervix will begin to soften right away. You may not look very pregnant during the first 3 months. But you are likely to have some common signs of early pregnancy:    Nausea    Fatigue    Frequent urination    Mood swings    Bloating of the belly    Constipation    Heartburn    Missed or light periods (first trimester bleeding)    Nipple or breast tenderness and breast swelling  It s not too late to start good habits  What matters most is protecting your baby from this moment on. If you smoke, drink alcohol, or use drugs, now is the time to stop. If you need help, talk with your healthcare provider:    Smoking increases the risk of stillbirth or having a low-birth-weight baby. If you smoke, quit now.    Alcohol and drugs have been linked with miscarriage, birth defects, intellectual disability, and low birth weight. Do not  drink alcohol or take drugs.  Tips to relieve nausea  Although nausea can happen at any time of the day, it may be worse in the morning. To help prevent nausea:    Eat small, light meals at frequent intervals.    Drink fluids often.    Get up slowly. Eat a few unsalted crackers before you get out of bed.    Avoid smells that bother you.    Avoid spicy and fatty foods.    Eat an ice pop in your favorite flavor.    Get plenty of rest.    Ask your healthcare provider about taking gill or vitamin B6 for nausea and vomiting.    Talk with your healthcare provider if you take vitamins that upset your stomach.  Work concerns  The end of the first trimester is a good time to discuss working during pregnancy with your employer. Follow your healthcare provider s advice if your job needs you to stand for a long time, work with hazardous tools, or even sit at a desk all day. Your workspace, workload, or scheduled hours may need to be adjusted. Perhaps you can change body postures more often or take an extra break.  Advice for travel  Talk to your healthcare provider first, but the second trimester may be the best time for any travel. You may be advised to avoid certain trips while you re pregnant. Food and water can be concerns in developing countries. Travel by car is a good choice, as you can stop, get out, and stretch. Bring snacks and water along. Fasten the lap belt below your belly, low over your hips. Also be sure to wear the shoulder harness.  Intimacy  Unless your healthcare provider tells you to, there is no reason to stop having sex while you re pregnant. You or your partner may notice changes in desire. Desire may be less in the first trimester, due to nausea and fatigue. In the second trimester, sex may be very enjoyable. The third trimester can be a challenge comfort-wise. Try different positions and see what s best for you both.  Date Last Reviewed: 10/1/2017    2130-7971 [employer]. [address] [place] [address]  [place] [address]. All rights reserved. This information is not intended as a substitute for professional medical care. Always follow your healthcare professional's instructions.

## 2018-12-05 NOTE — PROGRESS NOTES
Preceptor Attestation:   Patient seen, evaluated and discussed with the resident. I have verified the content of the note, which accurately reflects my assessment of the patient and the plan of care.   Supervising Physician:  Terri Chinchilla MD

## 2018-12-05 NOTE — PROGRESS NOTES
Subjective  Concerns: Pain on both side of abdomen. Feels like gas pain to patient. Tried warm packs on the side of the abdomen. Helped some, but not completely. No change in bowel movements. No blood in the stool. No diarrhea.     Still experiencing nausea and occasional vomiting, but states the B6 helps. Vomiting approximately every other day.     Stopped Betel nut use.     ROS:  YES, chronic headaches, takes tylenol with limited improvement - Headache  No, light sensitivity- Changes in vision  No - Chest Pain  No - Shortness of Breath  YES - Nausea   YES - Vomiting  YES - Abdominal pain   No - Contractions  No - Dysuria   No - Vaginal Discharge    No - Vaginal bleeding   No - Loss of Fluid   No - Extremity swelling   Absent - Fetal movement     Zika Screening  Have you travelled during the pregnancy?No  Have your sexual partner(s) travelled during the pregnancy?No    Patient Active Problem List   Diagnosis     Absolute glaucoma, right eye     Blind right eye     Lung granuloma (H)     Microphthalmia with cataract     Migraine     Supervision of high-risk pregnancy of elderly multigravida     AMA (advanced maternal age) multigravida 35+     Language barrier, cultural differences     Betel deposit on teeth     Hyperemesis gravidarum       Moo Say speaks Nia so an  was used today.    Guidance:  signs of miscarriage  smoking intervention  OTC medications  genetic testing  weight gain and exercise  quickening  fetal survey ultrasound    Going to WIC? No, unfamiliar with program.       Objective  LMP 09/07/2018 (Within Days)  No distress.  Gravid abdomen, soft and nontender to palpation. Fundus palpable above pubic bone. No edema.    Results  Blood type: A POS  Results for orders placed or performed in visit on 11/21/18   ABO/Rh Type-[facility] ([facility])   Result Value Ref Range    ABO/Rh(D) A POS     Repeat ABO/Rh Typing ([facility]) A POS     Narrative    Test performed by:   [facility]  [address], [place] [address]   Antibody Screen ([facility])   Result Value Ref Range    Antibody Screen Negative Negative    Narrative    Test performed by:   [facility]  [address], [place] [address]   Chlamydia/Gono Amplified ([facility])   Result Value Ref Range    Chlamydia trac,Amplified Prb Negative Negative    N gonorrhoeae,Amplified Prb Negative Negative    Narrative    Test performed by:  [facility] [address] [place] [address]   CBC with Plt ([facility])   Result Value Ref Range    WBC 11.4 (H) 4.0 - 11.0 K/uL    RBC 4.2 3.8 - 5.2 M/uL    Hemoglobin 13.1 11.7 - 15.7 g/dL    Hematocrit 41.9 35.0 - 47.0 %    .5 (H) 78.0 - 100.0 fL    MCH 31.4 26.5 - 35.0    MCHC 31.3 (L) 32.0 - 36.0 g/dL    Platelets 266.0 150.0 - 450.0 K/uL   Culture Urine ([facility])   Result Value Ref Range    Culture SEE RESULTS BELOW     Narrative    Test performed by:  [facility] [address] [place] [address]   Hepatitis B Surface Ag ([facility])   Result Value Ref Range    Hepatitis B Surface Antigen Negative Negative    Narrative    Test performed by:  [facility] [address] [place] [address]   HIV Ag/Ab Screen [place] ([facility])   Result Value Ref Range    HIV Antigen/Antibody Negative Negative    Narrative    Test performed by:  [facility] [address] [place] [address]  Method is Abbott HIV Ag/Ab for the detection of HIV p24 antigen, HIV-1   antibodies and HIV-2 antibodies.   Lead, Blood ([facility])   Result Value Ref Range    Lead See Note. <5.0 ug/dL    Collection Method Venous     Lead Retest No     Narrative    Test performed by:  [facility] [address] [place] [address]   Rubella  IgG ([facility])   Result Value Ref Range    Rubella IgG Positive     Narrative    Test performed by:  [facility] [address] [place] [address]  Negative: Absence of detectable rubella virus IgG antibodies. A negative    result presumes that immunity has not been acquired.   Equivocal: Suggest recollection.  Positive: Considered positive for IgG antibodies to rubella virus.   Syphilis Screen [place] ([facility])   Result Value Ref Range    Treponema Antibody (Syphilis) Negative Negative    Narrative    Test performed by:  [facility] [address] [place] [address]   Urinalysis([facility])   Result Value Ref Range    Specific Gravity Urine >=1.030 1.005 - 1.030    pH Urine 6.5 4.5 - 8.0    Leukocyte Esterase UR Negative NEGATIVE    Nitrite Urine Negative NEGATIVE    Protein UR 1+ (A) NEGATIVE    Glucose Urine Negative NEGATIVE    Ketones Urine Negative NEGATIVE    Urobilinogen mg/dL 0.2 E.U./dL 0.2 E.U./dL    Bilirubin UR Negative NEGATIVE    Blood UR Negative NEGATIVE   GYN Cytology ([facility])   Result Value Ref Range    Lab AP Case Report SEE RESULTS BELOW     Lab AP Gyn Interpretation SEE RESULTS BELOW Negative for squamous intraepithelial le    Lab AP Malignant? Normal Normal    Specimen adequacy:       Satisfactory for evaluation, endocervical/transformation zone component present    HPV Reflex? Yes regardless of result     High Risk? No     Last Mens Period Unknown, currently 9w pregnant     Lab AP Abnormal Bleeding No     Lab AP Patient Status Pregnant     Lab AP Birth Control/Hormones None     Lab AP Previous Normal Unknown     Lab AP Previous Abnl No     Lab AP Cervical Appearance Normal     Narrative    Test performed by:  [facility]  [address] [place] [address]   Hepatitis B Surface Ab ([facility])   Result Value Ref Range    Hepatitis B Surface Antibody Positive (A) Negative    Narrative    Test performed by:  [facility] [address] [place] [address]   Hemoglobin A1c (P )   Result Value Ref Range    Hemoglobin A1C 5.1 4.1 - 5.7 %   Eunice Zoster Imm Status Emmanuelle ([facility])   Result Value Ref Range    V.zoster Immune Status Positive     Narrative    Test  performed by:  [facility] [address] [place] [address]  Assay interference due to circulating antibodies against HIV, Hepatitis A,   Hepatitis B, Hepatitis C, HAMA and Rheumatoid Factor has not been evaluated.  The assay performance in detecting antibodies to Varicella Zoster in   individuals vaccinated with the FDA-licensed VZV vaccine has not been   established.  Negative: Absence of detectable Varicella Zoster IgG antibodies. A negative   result indicates no detectable VZV antibody, but does not rule out acute   infection. It should be noted that the test usually scores negative in   infected patients during the incubation period and the early stages of   infection.  Equivocal: Suggest recollection.  Positive: Presence of detectable Varicella Zoster IgG antibodies. A positive   result generally indicates exposure to the pathogen or administration of   specific immune-globulins, but it is no indication of active infection or   stage of disease.   Hepatic Profile ([facility])   Result Value Ref Range    Bilirubin Total 0.4 0.0 - 1.0 mg/dL    Bilirubin Direct 0.1 <=0.5 mg/dL    Protein Total 7.6 6.0 - 8.0 g/dL    Albumin 3.3 (L) 3.5 - 5.0 g/dL    Alkaline Phosphatase 62 45 - 120 U/L    AST (SGOT) 32 0 - 40 U/L    ALT (SGPT) 50 (H) 0 - 45 U/L    Narrative    Test performed by:  [facility] [address] [place] [address]   Lead With Demographics ([facility])   Result Value Ref Range    Lead, Blood (Venous) <2.0 0.0 - 4.9 ug/dL    Narrative    Test performed by:  [facility]  [address] [place] [id number]   HPV Hendricks PCR ([facility])   Result Value Ref Range    HPV Source SurePath     HPV 16 DNA Negative NEG    HPV 18 DNA Negative NEG    Other HR HPV Negative NEG    Final Diagnosis SEE NOTES     Specimen Description Cervical Cells     Narrative    Test performed by:  [facility]  [address], [place] [address]        Assessment & Plan  38 year old  at 11w6d with HARJIT 2019 based on 9w4d US    Moo was seen today for prenatal care and gastrointestinal problem.    Diagnoses and all orders for this visit:    Supervision of high-risk pregnancy of elderly multigravida  Nausea and vomiting improved. Just taking vitamin B6 and prenatal vitamins currently. No vaginal bleeding or concern for miscarriage. Referral placed to [facility] at last visit, but patient has not yet scheduled appointment. Discussed influenza vaccine and patient reports that she received vaccine a couple of months ago. OB coordinator planning to discuss WIC and help with scheduling appointment with M today.     Abdominal pain, generalized  Pain most consistent with flatulence based on description and clinical exam. Prescription for simethicone sent. Counseled patient to return to clinic if symptoms worsen or fail to improve. She voiced understanding and agreement of this plan.   -     simethicone (MYLICON) 80 MG chewable tablet; Take 1 tablet (80 mg) by mouth every 6 hours as needed for flatulence or cramping      Weight gain adequate: 2 lb 9.6 oz (1.179 kg) to date, out of recommended total of 11-20 lbs (pregravid BMI >30)    Return to clinic in 4 weeks.    Azul Olivares MD  I precepted today with  Dr. Terri Chinchilla.

## 2018-12-10 ENCOUNTER — OFFICE VISIT - HEALTHEAST (OUTPATIENT)
Dept: MATERNAL FETAL MEDICINE | Facility: HOSPITAL | Age: 38
End: 2018-12-10

## 2018-12-10 ENCOUNTER — RECORDS - HEALTHEAST (OUTPATIENT)
Dept: ULTRASOUND IMAGING | Facility: HOSPITAL | Age: 38
End: 2018-12-10

## 2018-12-10 ENCOUNTER — RECORDS - HEALTHEAST (OUTPATIENT)
Dept: ADMINISTRATIVE | Facility: OTHER | Age: 38
End: 2018-12-10

## 2018-12-10 ENCOUNTER — AMBULATORY - HEALTHEAST (OUTPATIENT)
Dept: LAB | Facility: HOSPITAL | Age: 38
End: 2018-12-10

## 2018-12-10 DIAGNOSIS — Z34.90 ENCOUNTER FOR SUPERVISION OF NORMAL PREGNANCY, UNSPECIFIED, UNSPECIFIED TRIMESTER: ICD-10-CM

## 2018-12-10 DIAGNOSIS — O09.521 SUPERVISION OF ELDERLY MULTIGRAVIDA IN FIRST TRIMESTER: ICD-10-CM

## 2018-12-10 DIAGNOSIS — O09.521 ADVANCED MATERNAL AGE IN MULTIGRAVIDA, FIRST TRIMESTER: ICD-10-CM

## 2018-12-10 DIAGNOSIS — Z34.90 PRENATAL CARE, ANTEPARTUM: ICD-10-CM

## 2018-12-16 LAB
MISCELLANEOUS TEST DEPT. - HE HISTORICAL: NORMAL
PERFORMING LAB: NORMAL
SPECIMEN STATUS: NORMAL
TEST NAME: NORMAL

## 2018-12-19 ENCOUNTER — COMMUNICATION - HEALTHEAST (OUTPATIENT)
Dept: MATERNAL FETAL MEDICINE | Facility: HOSPITAL | Age: 38
End: 2018-12-19

## 2019-01-03 ENCOUNTER — OFFICE VISIT (OUTPATIENT)
Dept: FAMILY MEDICINE | Facility: CLINIC | Age: 39
End: 2019-01-03
Payer: COMMERCIAL

## 2019-01-03 VITALS
DIASTOLIC BLOOD PRESSURE: 76 MMHG | OXYGEN SATURATION: 97 % | HEART RATE: 74 BPM | TEMPERATURE: 98.3 F | BODY MASS INDEX: 33.81 KG/M2 | RESPIRATION RATE: 18 BRPM | WEIGHT: 197 LBS | SYSTOLIC BLOOD PRESSURE: 112 MMHG

## 2019-01-03 DIAGNOSIS — O09.529 SUPERVISION OF HIGH-RISK PREGNANCY OF ELDERLY MULTIGRAVIDA: Primary | ICD-10-CM

## 2019-01-03 DIAGNOSIS — G44.209 TENSION HEADACHE: ICD-10-CM

## 2019-01-03 NOTE — PATIENT INSTRUCTIONS
- Follow up in one week.   - Tylenol-caffeine 1-2 capsules every 6 hours as needed for pain.     If you have thoughts about self harm or if you just need additional support and care, here are some resources for you:    Crisis Lines:  [facility] Multilingual Crisis Line:  [phone]    Crisis Text Line: Text [place] to 895208. Free support at your fingertips 24/7  People who text [place] to 956427 will be connected with a counselor. Crisis Text Line is available 24 hours a day, seven days a week.    You can also consider going to the [facility] at the following address.  Walk ins are welcome:    [address] [place] [address]   [phone] (for 24 hour crisis consultation)    Monday - Friday 8:00am - 7:00pm  Saturday:  11:00am - 3:00pm  Sunday and Holidays Closed    If you feel at risk of immediate harm, go directly to the Emergency Department.

## 2019-01-03 NOTE — NURSING NOTE
Due to patient being non-English speaking/uses sign language, an  was used for this visit. Only for face-to-face interpretation by an external agency, date and length of interpretation can be found on the scanned worksheet.     name: Language Line  Agency: [employer] - [employer]  Language: Nia   Telephone number: [phone]  Type of interpretation: Telemedicine, spoken
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
## 2019-01-15 ENCOUNTER — OFFICE VISIT (OUTPATIENT)
Dept: FAMILY MEDICINE | Facility: CLINIC | Age: 39
End: 2019-01-15
Payer: COMMERCIAL

## 2019-01-15 VITALS
HEART RATE: 80 BPM | DIASTOLIC BLOOD PRESSURE: 77 MMHG | WEIGHT: 195.2 LBS | SYSTOLIC BLOOD PRESSURE: 111 MMHG | TEMPERATURE: 98.1 F | BODY MASS INDEX: 33.51 KG/M2

## 2019-01-15 DIAGNOSIS — F43.21 ADJUSTMENT DISORDER WITH DEPRESSED MOOD: ICD-10-CM

## 2019-01-15 DIAGNOSIS — O09.529 SUPERVISION OF HIGH-RISK PREGNANCY OF ELDERLY MULTIGRAVIDA: Primary | ICD-10-CM

## 2019-01-15 DIAGNOSIS — G43.909 MIGRAINE WITHOUT STATUS MIGRAINOSUS, NOT INTRACTABLE, UNSPECIFIED MIGRAINE TYPE: ICD-10-CM

## 2019-01-15 DIAGNOSIS — O09.522 ELDERLY MULTIGRAVIDA IN SECOND TRIMESTER: ICD-10-CM

## 2019-01-15 ASSESSMENT — PATIENT HEALTH QUESTIONNAIRE - PHQ9: SUM OF ALL RESPONSES TO PHQ QUESTIONS 1-9: 5

## 2019-01-15 NOTE — NURSING NOTE
Due to patient being non-English speaking/uses sign language, an  was used for this visit. Only for face-to-face interpretation by an external agency, date and length of interpretation can be found on the scanned worksheet.     name: Calvin Centeno  Agency: [employer]  Language: Nia   Telephone number: [phone]  Type of interpretation: Face-to-face, spoken

## 2019-01-15 NOTE — PATIENT INSTRUCTIONS
- Bring social security card at next visit  - Bring headache medication  - Follow up in 2-3 weeks   - Referral placed to maternal fetal medicine for ultrasound to see baby     Maternal Fetal Medicine (MFM)   [facility]  [address]  [place] [address]    Phone: [phone]    APPT: Monday, 1/21/19 at 1:30 PM for Level 2 ultrasound and to see genetic counselor for 2nd trimester genetic screen    RIDE: [facility] will pick pt up between 12:30-12:45 PM

## 2019-01-15 NOTE — PROGRESS NOTES
Preceptor Attestation:   Patient seen, evaluated and discussed with the resident. I have verified the content of the note, which accurately reflects my assessment of the patient and the plan of care.   Supervising Physician:  Robert Lee MD

## 2019-01-15 NOTE — PROGRESS NOTES
SUBJECTIVE       Moo Say is a 38 year old  female with a PMH significant for:     Patient Active Problem List   Diagnosis     Absolute glaucoma, right eye     Blind right eye     Lung granuloma (H)     Microphthalmia with cataract     Migraine     Supervision of high-risk pregnancy of elderly multigravida     AMA (advanced maternal age) multigravida 35+     Language barrier, cultural differences     Betel deposit on teeth     Hyperemesis gravidarum     She presents with for follow up of mood.    Mood: Feeling better this week. Quite overwhelmed at visit last week. Talks to family every day, which she reports is helpful. No thoughts of hurting herself or others. Children are doing well and not a source of concern. Watching children growing up makes her happy. Her health is a significant influence on her mood Reports that when she gets headaches, she feels very badly about herself. She reports feeling guilty that she is unable to work and help provide for her family.     Headaches: Improved this week. Taking supplement for headaches when needed. She reports she is only using it when it is severe. Has only needed it 3 times since [place] got to the [place]. Otherwise not taking anything. Feels like it is a burning in her head. Sensitive to light and sound with headaches. Vision changes present during the headaches. Headaches associated with feeling sad or watching movies that make her sad. No numbness or tingling. No nausea or vomiting. No shortness of breath.     Pregnancy: Expressed concerns about how she will get to the hospital. Her  doesn't drive and they don't have a car.    PMH, social history, medications and allergies were reviewed and updated as needed.        REVIEW OF SYSTEMS     As per HPI.         OBJECTIVE     Vitals:    01/15/19 0841   BP: 111/77   Pulse: 80   Temp: 98.1  F (36.7  C)   Weight: 88.5 kg (195 lb 3.2 oz)     Body mass index is 33.51 kg/m .    Constitutional: Awake, alert, cooperative,  no apparent distress, and appears stated age.  HEENT: Normocephalic, without obvious abnormality, atramatic  Lungs: No increased work of breathing, good air exchange, clear to auscultation bilaterally, no crackles or wheezing.  Cardiovascular: Regular rate and rhythm, normal S1 and S2, no S3 or S4, and no murmur noted.  Neurologic: Awake, alert, oriented to name, place and time.  Cranial nerves II-XII are grossly intact.   Neuropsychiatric: Normal affect, mood, orientation, memory and insight.      No results found for this or any previous visit (from the past 24 hour(s)).        ASSESSMENT AND PLAN     1. Supervision of high-risk pregnancy of elderly multigravida  Referral placed to [facility] for Level 2 ultrasound and MSAFP. Patient has concerns with how she will get to the hospital when it is time to deliver. Provided reassurance today and discussed that we will continue working on developing plan and providing resources at next visit.  No other pregnancy concerns today.   - [facility] REFERRAL-[facility]REGNANCY  - Provide additional resources and work on developing plan for patient to get to the hospital when she is in labor.     2. Migraine without status migrainosus, not intractable, unspecified migraine type  Patient with history of migraines. Had migraine last week that did not resolve with prescription medications. States that she did take supplement, but she is not sure what it is called. She does know that it is not recommended in pregnancy. Recommended discontinuing medication during pregnancy.  - Instructed patient to bring supplement to next office visit  - Excedrin migraine available as needed for migraine    3. Adjustment disorder with depressed mood  Seen last week for OB visit and was quite tearful throughout exam and reported feeling very overwhelmed with circumstances. Reports improvement in mood this week. PHQ-9 = 5. No homicidal or suicidal ideation.  - Will continue to monitor at visits .        RTC  in 2 weeks for follow up of pregnancy or sooner if develops new or worsening symptoms.    Patient discussed with Dr. Robert Lee who agrees with the assessment and plan.     Azul Olivares MD, PGY1  [facility]

## 2019-01-17 ENCOUNTER — TELEPHONE (OUTPATIENT)
Dept: MATERNAL FETAL MEDICINE | Facility: CLINIC | Age: 39
End: 2019-01-17

## 2019-01-17 ENCOUNTER — AMBULATORY - HEALTHEAST (OUTPATIENT)
Dept: MATERNAL FETAL MEDICINE | Facility: HOSPITAL | Age: 39
End: 2019-01-17

## 2019-01-17 DIAGNOSIS — O26.90 PREGNANCY, ANTEPARTUM, COMPLICATIONS: ICD-10-CM

## 2019-01-17 PROBLEM — O09.529 AMA (ADVANCED MATERNAL AGE) MULTIGRAVIDA 35+: Status: ACTIVE | Noted: 2018-11-21

## 2019-01-17 NOTE — TELEPHONE ENCOUNTER
KATIE received referral for L2 and GC for MSAFP from [facility].  Patient is scheduled for these appointments on 1/21/19 at [facility].    Arielle Guzmán

## 2019-01-21 ENCOUNTER — RECORDS - HEALTHEAST (OUTPATIENT)
Dept: ADMINISTRATIVE | Facility: OTHER | Age: 39
End: 2019-01-21

## 2019-01-21 ENCOUNTER — RECORDS - HEALTHEAST (OUTPATIENT)
Dept: ULTRASOUND IMAGING | Facility: HOSPITAL | Age: 39
End: 2019-01-21

## 2019-01-21 ENCOUNTER — OFFICE VISIT - HEALTHEAST (OUTPATIENT)
Dept: MATERNAL FETAL MEDICINE | Facility: HOSPITAL | Age: 39
End: 2019-01-21

## 2019-01-21 DIAGNOSIS — O09.522 AMA (ADVANCED MATERNAL AGE) MULTIGRAVIDA 35+, SECOND TRIMESTER: ICD-10-CM

## 2019-01-21 DIAGNOSIS — O35.9XX0 SUSPECTED FETAL ABNORMALITY AFFECTING MANAGEMENT OF MOTHER, SINGLE OR UNSPECIFIED FETUS: ICD-10-CM

## 2019-01-21 DIAGNOSIS — O26.90 PREGNANCY RELATED CONDITIONS, UNSPECIFIED, UNSPECIFIED TRIMESTER: ICD-10-CM

## 2019-02-04 ENCOUNTER — OFFICE VISIT (OUTPATIENT)
Dept: FAMILY MEDICINE | Facility: CLINIC | Age: 39
End: 2019-02-04
Payer: COMMERCIAL

## 2019-02-04 VITALS
OXYGEN SATURATION: 96 % | BODY MASS INDEX: 33.77 KG/M2 | RESPIRATION RATE: 24 BRPM | HEART RATE: 79 BPM | WEIGHT: 197.8 LBS | DIASTOLIC BLOOD PRESSURE: 85 MMHG | TEMPERATURE: 98.1 F | SYSTOLIC BLOOD PRESSURE: 122 MMHG | HEIGHT: 64 IN

## 2019-02-04 DIAGNOSIS — O09.529 AMA (ADVANCED MATERNAL AGE) MULTIGRAVIDA 35+: ICD-10-CM

## 2019-02-04 DIAGNOSIS — G44.83 PRIMARY COUGH HEADACHE: ICD-10-CM

## 2019-02-04 DIAGNOSIS — O09.529 SUPERVISION OF HIGH-RISK PREGNANCY OF ELDERLY MULTIGRAVIDA: Primary | ICD-10-CM

## 2019-02-04 DIAGNOSIS — R05.9 COUGH: ICD-10-CM

## 2019-02-04 RX ORDER — ACETAMINOPHEN 500 MG
500-1000 TABLET ORAL EVERY 6 HOURS PRN
Qty: 90 TABLET | Refills: 3 | Status: SHIPPED | OUTPATIENT
Start: 2019-02-04 | End: 2019-05-06

## 2019-02-04 RX ORDER — LORATADINE 10 MG/1
10 TABLET ORAL DAILY
Qty: 90 TABLET | Refills: 3 | Status: SHIPPED | OUTPATIENT
Start: 2019-02-04 | End: 2019-03-21

## 2019-02-04 RX ORDER — PRENATAL VIT/IRON FUM/FOLIC AC 27MG-0.8MG
1 TABLET ORAL DAILY
Qty: 100 TABLET | Refills: 3 | Status: SHIPPED | OUTPATIENT
Start: 2019-02-04 | End: 2019-05-06

## 2019-02-04 ASSESSMENT — MIFFLIN-ST. JEOR: SCORE: 1553.08

## 2019-02-04 NOTE — PROGRESS NOTES
Preceptor Attestation:   Patient seen, evaluated and discussed with the resident. I have verified the content of the note, which accurately reflects my assessment of the patient and the plan of care.   Supervising Physician:  Michael Rankin MD

## 2019-02-04 NOTE — PATIENT INSTRUCTIONS
2/4/2019  Moo Say    It was a pleasure to see you today at [facility].     My recommendations after this visit include:   1. Return if not improved with recent cough and viral illness  2. Keep appointment with Maternal fetal Medicine  3. Medications sent to pharmacy for cough, headache and congestion  4. 4 week return visit with Dr. Olivares    Thank you for allowing me to be a part of your health care team!    Sincerely,   Dr. Coreas

## 2019-02-04 NOTE — PROGRESS NOTES
"Subjective  Concerns: coughing and sore throat. She reports no sick contacts. No one with strep throat. She reports she did get her flu shot. She reports cough for 1 week. Sore throat for 1 week. She is not taking anything for this. She reports headache from coughing. She reports watering eyes. Blurry vision for 5-6 years.     ROS:  YES - Headache  No - Changes in vision  No - Chest Pain  No - Shortness of Breath  Yes with coughing- Nausea   No - Vomiting  No - Abdominal pain   No - Contractions  No - Dysuria   No - Vaginal Discharge    No - Vaginal bleeding   No - Loss of Fluid   No - Extremity swelling   Present - Fetal movement       Patient Active Problem List   Diagnosis     Absolute glaucoma, right eye     Blind right eye     Lung granuloma (H)     Microphthalmia with cataract     Migraine     Supervision of high-risk pregnancy of elderly multigravida     AMA (advanced maternal age) multigravida 35+     Language barrier, cultural differences     Betel deposit on teeth     Hyperemesis gravidarum       Moo Say speaks Nia so an  was used today.    Guidance:  Follow up with MFM for US  signs of miscarriage  OTC medications  genetic testing  weight gain and exercise  quickening  child birth education  fetal survey ultrasound    Going to WIC? Yes    Objective  /85   Pulse 79   Temp 98.1  F (36.7  C) (Oral)   Resp 24   Ht 1.627 m (5' 4.06\")   Wt 89.7 kg (197 lb 12.8 oz)   LMP 09/07/2018 (Within Days)   SpO2 96%   Breastfeeding? No   BMI 33.89 kg/m    No distress.  Gravid abdomen.  .  Fundal height appropriate at umbilicus. No edema.    Results  Blood type: A POS  Results for orders placed or performed in visit on 11/21/18   ABO/Rh Type-HML ([facility])   Result Value Ref Range    ABO/Rh(D) A POS     Repeat ABO/Rh Typing ([facility]) A POS     Narrative    Test performed by:   [facility]  [address], [place] [address]   Antibody Screen ([facility])   Result Value Ref Range    " Antibody Screen Negative Negative    Narrative    Test performed by:   [facility]  [address], [place] [address]   Chlamydia/Gono Amplified ([facility])   Result Value Ref Range    Chlamydia trac,Amplified Prb Negative Negative    N gonorrhoeae,Amplified Prb Negative Negative    Narrative    Test performed by:  [facility] [address] [place] [address]   CBC with Plt ([facility])   Result Value Ref Range    WBC 11.4 (H) 4.0 - 11.0 K/uL    RBC 4.2 3.8 - 5.2 M/uL    Hemoglobin 13.1 11.7 - 15.7 g/dL    Hematocrit 41.9 35.0 - 47.0 %    .5 (H) 78.0 - 100.0 fL    MCH 31.4 26.5 - 35.0    MCHC 31.3 (L) 32.0 - 36.0 g/dL    Platelets 266.0 150.0 - 450.0 K/uL   Culture Urine ([facility])   Result Value Ref Range    Culture SEE RESULTS BELOW     Narrative    Test performed by:  [facility] [address] [place] [address]   Hepatitis B Surface Ag ([facility])   Result Value Ref Range    Hepatitis B Surface Antigen Negative Negative    Narrative    Test performed by:  [facility] [address] [place] [address]   HIV Ag/Ab Screen [place] ([facility])   Result Value Ref Range    HIV Antigen/Antibody Negative Negative    Narrative    Test performed by:  [facility] [address] [place] [address]  Method is Abbott HIV Ag/Ab for the detection of HIV p24 antigen, HIV-1   antibodies and HIV-2 antibodies.   Lead, Blood ([facility])   Result Value Ref Range    Lead See Note. <5.0 ug/dL    Collection Method Venous     Lead Retest No     Narrative    Test performed by:  [facility] [address] [place] [address]   Rubella  IgG ([facility])   Result Value Ref Range    Rubella IgG Positive     Narrative    Test performed by:  [facility] [address] [place] [address]  Negative: Absence of detectable rubella virus IgG antibodies. A negative   result presumes that immunity has not been acquired.   Equivocal: Suggest  recollection.  Positive: Considered positive for IgG antibodies to rubella virus.   Syphilis Screen Ray ([facility])   Result Value Ref Range    Treponema Antibody (Syphilis) Negative Negative    Narrative    Test performed by:  [facility] [address] [place] [address]   Urinalysis([facility])   Result Value Ref Range    Specific Gravity Urine >=1.030 1.005 - 1.030    pH Urine 6.5 4.5 - 8.0    Leukocyte Esterase UR Negative NEGATIVE    Nitrite Urine Negative NEGATIVE    Protein UR 1+ (A) NEGATIVE    Glucose Urine Negative NEGATIVE    Ketones Urine Negative NEGATIVE    Urobilinogen mg/dL 0.2 E.U./dL 0.2 E.U./dL    Bilirubin UR Negative NEGATIVE    Blood UR Negative NEGATIVE   GYN Cytology ([facility])   Result Value Ref Range    Lab AP Case Report SEE RESULTS BELOW     Lab AP Gyn Interpretation SEE RESULTS BELOW Negative for squamous intraepithelial le    Lab AP Malignant? Normal Normal    Specimen adequacy:       Satisfactory for evaluation, endocervical/transformation zone component present    HPV Reflex? Yes regardless of result     High Risk? No     Last Mens Period Unknown, currently 9w pregnant     Lab AP Abnormal Bleeding No     Lab AP Patient Status Pregnant     Lab AP Birth Control/Hormones None     Lab AP Previous Normal Unknown     Lab AP Previous Abnl No     Lab AP Cervical Appearance Normal     Narrative    Test performed by:  [facility]  [address] [place] [address]   Hepatitis B Surface Ab ([facility])   Result Value Ref Range    Hepatitis B Surface Antibody Positive (A) Negative    Narrative    Test performed by:  [facility] [address] [place] [address]   Hemoglobin A1c ([facility])   Result Value Ref Range    Hemoglobin A1C 5.1 4.1 - 5.7 %   Eunice Zoster Imm Status Emmanuelle ([facility])   Result Value Ref Range    V.zoster Immune Status Positive     Narrative    Test performed by:  [facility]  [address] [place]  [address]  Assay interference due to circulating antibodies against HIV, Hepatitis A,   Hepatitis B, Hepatitis C, HAMA and Rheumatoid Factor has not been evaluated.  The assay performance in detecting antibodies to Varicella Zoster in   individuals vaccinated with the FDA-licensed VZV vaccine has not been   established.  Negative: Absence of detectable Varicella Zoster IgG antibodies. A negative   result indicates no detectable VZV antibody, but does not rule out acute   infection. It should be noted that the test usually scores negative in   infected patients during the incubation period and the early stages of   infection.  Equivocal: Suggest recollection.  Positive: Presence of detectable Varicella Zoster IgG antibodies. A positive   result generally indicates exposure to the pathogen or administration of   specific immune-globulins, but it is no indication of active infection or   stage of disease.   Hepatic Profile ([facility])   Result Value Ref Range    Bilirubin Total 0.4 0.0 - 1.0 mg/dL    Bilirubin Direct 0.1 <=0.5 mg/dL    Protein Total 7.6 6.0 - 8.0 g/dL    Albumin 3.3 (L) 3.5 - 5.0 g/dL    Alkaline Phosphatase 62 45 - 120 U/L    AST (SGOT) 32 0 - 40 U/L    ALT (SGPT) 50 (H) 0 - 45 U/L    Narrative    Test performed by:  [facility] [address] [place] [address]   Lead With Demographics ([facility])   Result Value Ref Range    Lead, Blood (Venous) <2.0 0.0 - 4.9 ug/dL    Narrative    Test performed by:  [facility]  [address] [place] [id number]   HPV [place] PCR ([facility])   Result Value Ref Range    HPV Source SurePath     HPV 16 DNA Negative NEG    HPV 18 DNA Negative NEG    Other HR HPV Negative NEG    Final Diagnosis SEE NOTES     Specimen Description Cervical Cells     Narrative    Test performed by:  [facility]  [address], [place] [address]       Assessment & Plan  38 year old  at 20w4d with HARJIT 2019  based on 9 week US    Moo was seen today for prenatal care.    Diagnoses and all orders for this visit:    Supervision of high-risk pregnancy of elderly multigravida  -     Prenatal Vit-Fe Fumarate-FA (PRENATAL MULTIVITAMIN W/IRON) 27-0.8 MG tablet; Take 1 tablet by mouth daily    Cough  -     loratadine (CLARITIN) 10 MG tablet; Take 1 tablet (10 mg) by mouth daily  -     dextromethorphan (TUSSIN COUGH) 15 MG/5ML syrup; Take 10 mLs (30 mg) by mouth 4 times daily as needed for cough    Primary cough headache  -     acetaminophen (TYLENOL) 500 MG tablet; Take 1-2 tablets (500-1,000 mg) by mouth every 6 hours as needed for mild pain        Weight gain adequate: 5.806 kg (12 lb 12.8 oz) to date, out of recommended total of 11-20 lbs (pregravid BMI >30)    Patient Instructions   2/4/2019  Moo Say    It was a pleasure to see you today at [facility].     My recommendations after this visit include:   1. Return if not improved with recent cough and viral illness  2. Keep appointment with Maternal fetal Medicine  3. Medications sent to pharmacy for cough, headache and congestion  4. 4 week return visit with Dr. Olivares    Thank you for allowing me to be a part of your health care team!    Sincerely,   Dr. Coreas      Return to clinic in 4 weeks.    Stacey Coreas, DO  I precepted today with Michael Rankin MD.

## 2019-02-04 NOTE — NURSING NOTE
Chief Complaint   Patient presents with     Prenatal Care     Obstetrics F/U     Donnell Chappell CMA    Due to patient being non-English speaking/uses sign language, an  was used for this visit. Only for face-to-face interpretation by an external agency, date and length of interpretation can be found on the scanned worksheet.     name: ZIGGY ZARATE  Agency: [employer]  Language: Nia   Telephone number: [phone]  Type of interpretation: Face-to-face, spoken     Donnell Chappell CMA

## 2019-02-18 ENCOUNTER — OFFICE VISIT - HEALTHEAST (OUTPATIENT)
Dept: MATERNAL FETAL MEDICINE | Facility: HOSPITAL | Age: 39
End: 2019-02-18

## 2019-02-18 ENCOUNTER — RECORDS - HEALTHEAST (OUTPATIENT)
Dept: ADMINISTRATIVE | Facility: OTHER | Age: 39
End: 2019-02-18

## 2019-02-18 ENCOUNTER — RECORDS - HEALTHEAST (OUTPATIENT)
Dept: ULTRASOUND IMAGING | Facility: HOSPITAL | Age: 39
End: 2019-02-18

## 2019-02-18 DIAGNOSIS — O35.9XX0 MATERNAL CARE FOR (SUSPECTED) FETAL ABNORMALITY AND DAMAGE, UNSPECIFIED, NOT APPLICABLE OR UNSPECIFIED: ICD-10-CM

## 2019-02-18 DIAGNOSIS — O09.529 AMA (ADVANCED MATERNAL AGE) MULTIGRAVIDA 35+: ICD-10-CM

## 2019-02-22 DIAGNOSIS — O26.90 PREGNANCY RELATED CONDITION, ANTEPARTUM: ICD-10-CM

## 2019-02-26 ENCOUNTER — OFFICE VISIT (OUTPATIENT)
Dept: FAMILY MEDICINE | Facility: CLINIC | Age: 39
End: 2019-02-26
Payer: COMMERCIAL

## 2019-02-26 VITALS
SYSTOLIC BLOOD PRESSURE: 125 MMHG | RESPIRATION RATE: 20 BRPM | TEMPERATURE: 98.4 F | HEART RATE: 77 BPM | DIASTOLIC BLOOD PRESSURE: 85 MMHG | OXYGEN SATURATION: 98 %

## 2019-02-26 DIAGNOSIS — M25.561 ACUTE PAIN OF RIGHT KNEE: ICD-10-CM

## 2019-02-26 DIAGNOSIS — O09.529 SUPERVISION OF HIGH-RISK PREGNANCY OF ELDERLY MULTIGRAVIDA: Primary | ICD-10-CM

## 2019-02-26 ASSESSMENT — PATIENT HEALTH QUESTIONNAIRE - PHQ9: SUM OF ALL RESPONSES TO PHQ QUESTIONS 1-9: 7

## 2019-02-26 NOTE — PROGRESS NOTES
Subjective  Concerns: Fell on right knee and on side when walking outside earlier this morning after slipping on a patch of ice. Reports that she is having difficulty walking after the fall. Pain is sharp and present especially on the anterior and medial aspects of her right knee. She is unable to straighten her knee due to the pain. Tried taking tylenol with limited pain relief. She did not hit her abdomen or her head.    Reports that she developed a left-sided headache this morning after she was crying due to her knee pain. Pain is sharp and throbbing in nature. Reports that she tried taking tylenol with limited improvement in her head pain. She has not been taking the supplement from [employer]. No ibuprofen use.      Still feeling that her mood is down. States that this is definitely worse as she is currently in pain. No suicidal or homicidal ideation.     ROS:  YES - Headache  No - Changes in vision  No - Chest Pain  No - Shortness of Breath  No - Nausea   No - Vomiting  No - Abdominal pain   No - Contractions  No - Dysuria   No - Vaginal Discharge    No - Vaginal bleeding   No - Loss of Fluid   No - Extremity swelling   Present - Fetal movement     Going to WIC? Yes    Risk Assessment   Average Risk Category  No significant risk factors: Has risk factors    At Risk Category (up to 3)  Teen pregnancy: No  Poor social situation: No  Domestic abuse: No  Financial difficulties: Yes  Smoker: No  H/O  delivery: No  H/O drug abuse: No  Non-English speaking: Yes  Advanced maternal age: Yes  GDM risks: Yes  Previous C/S: No  H/O PIH: No  H/O STIs: No  H/O mental health concerns: Yes  Onset care > 20 weeks: No    High Risk Category (4 or more At Risk or)  Diabetes/GDM: No  Multiple gestation: No  Chronic hypertension: No  Significant hx of asthma: No  Fetal demise > 20 weeks: No  Positive tox screen: No  Current mental health treatment: No    Risk: High risk  Date determined: 2019    Patient Active Problem List    Diagnosis     Absolute glaucoma, right eye     Blind right eye     Lung granuloma (H)     Microphthalmia with cataract     Migraine     Supervision of high-risk pregnancy of elderly multigravida     AMA (advanced maternal age) multigravida 35+     Language barrier, cultural differences     Betel deposit on teeth     Hyperemesis gravidarum       Moo Say speaks Nia so an  was used today.    Guidance:  signs of  labor    Objective  /85 (BP Location: Left arm, Patient Position: Sitting, Cuff Size: Adult Regular)   Pulse 77   Temp 98.4  F (36.9  C) (Oral)   Resp 20   LMP 2018 (Within Days)   SpO2 98%   No distress.  Gravid abdomen.  's with accels into 150's.  Fundal height 24 cm.  no edema.  Extremities:  Antalgic gait favoring left side. Difficulty straightening right leg due to pain. Difficulty performing exam maneuvers as patient had difficulty ambulating to exam table due to pain. Tender to palpation over anterior aspect of patella and medial aspect of right knee. Mild tenderness to palpation over lateral aspect of right knee. Mild-moderate right knee effusion. No effusion on left knee. No tenderness to palpation on left knee. Normal ROM of left extremity. DP and TP pulses 2+ bilaterally. No significant lower extremity edema.    Results  Blood type: A POS  Results for orders placed or performed in visit on 18   ABO/Rh Type-HML ([facility])   Result Value Ref Range    ABO/Rh(D) A POS     Repeat ABO/Rh Typing ([facility]) A POS     Narrative    Test performed by:   [facility]  [address], [place] [address]   Antibody Screen ([facility])   Result Value Ref Range    Antibody Screen Negative Negative    Narrative    Test performed by:   [facility]  [address], [place] [address]   Chlamydia/Gono Amplified ([facility])   Result Value Ref Range    Chlamydia trac,Amplified Prb Negative Negative    N gonorrhoeae,Amplified Prb Negative Negative    Narrative    Test  performed by:  [facility] [address] [place] [address]   CBC with Plt ([facility])   Result Value Ref Range    WBC 11.4 (H) 4.0 - 11.0 K/uL    RBC 4.2 3.8 - 5.2 M/uL    Hemoglobin 13.1 11.7 - 15.7 g/dL    Hematocrit 41.9 35.0 - 47.0 %    .5 (H) 78.0 - 100.0 fL    MCH 31.4 26.5 - 35.0    MCHC 31.3 (L) 32.0 - 36.0 g/dL    Platelets 266.0 150.0 - 450.0 K/uL   Culture Urine ([facility])   Result Value Ref Range    Culture SEE RESULTS BELOW     Narrative    Test performed by:  [facility] [address] [place] [address]   Hepatitis B Surface Ag ([facility])   Result Value Ref Range    Hepatitis B Surface Antigen Negative Negative    Narrative    Test performed by:  [facility] [address] [place] [address]   HIV Ag/Ab Screen [place] ([facility])   Result Value Ref Range    HIV Antigen/Antibody Negative Negative    Narrative    Test performed by:  [facility] [address] [place] [address]  Method is Abbott HIV Ag/Ab for the detection of HIV p24 antigen, HIV-1   antibodies and HIV-2 antibodies.   Lead, Blood ([facility])   Result Value Ref Range    Lead See Note. <5.0 ug/dL    Collection Method Venous     Lead Retest No     Narrative    Test performed by:  [facility] [address] [place] [address]   Rubella  IgG ([facility])   Result Value Ref Range    Rubella IgG Positive     Narrative    Test performed by:  [facility] [address] [place] [address]  Negative: Absence of detectable rubella virus IgG antibodies. A negative   result presumes that immunity has not been acquired.   Equivocal: Suggest recollection.  Positive: Considered positive for IgG antibodies to rubella virus.   Syphilis Screen [place] ([facility])   Result Value Ref Range    Treponema Antibody (Syphilis) Negative Negative    Narrative    Test performed by:  [facility] [address] [place] [address]    Urinalysis([facility])   Result Value Ref Range    Specific Gravity Urine >=1.030 1.005 - 1.030    pH Urine 6.5 4.5 - 8.0    Leukocyte Esterase UR Negative NEGATIVE    Nitrite Urine Negative NEGATIVE    Protein UR 1+ (A) NEGATIVE    Glucose Urine Negative NEGATIVE    Ketones Urine Negative NEGATIVE    Urobilinogen mg/dL 0.2 E.U./dL 0.2 E.U./dL    Bilirubin UR Negative NEGATIVE    Blood UR Negative NEGATIVE   GYN Cytology ([facility])   Result Value Ref Range    Lab AP Case Report SEE RESULTS BELOW     Lab AP Gyn Interpretation SEE RESULTS BELOW Negative for squamous intraepithelial le    Lab AP Malignant? Normal Normal    Specimen adequacy:       Satisfactory for evaluation, endocervical/transformation zone component present    HPV Reflex? Yes regardless of result     High Risk? No     Last Mens Period Unknown, currently 9w pregnant     Lab AP Abnormal Bleeding No     Lab AP Patient Status Pregnant     Lab AP Birth Control/Hormones None     Lab AP Previous Normal Unknown     Lab AP Previous Abnl No     Lab AP Cervical Appearance Normal     Narrative    Test performed by:  [facility]  [address] [place] [address]   Hepatitis B Surface Ab ([facility])   Result Value Ref Range    Hepatitis B Surface Antibody Positive (A) Negative    Narrative    Test performed by:  [facility] [address] [place] [address]   Hemoglobin A1c ([facility])   Result Value Ref Range    Hemoglobin A1C 5.1 4.1 - 5.7 %   Eunice Zoster Imm Status Emmanuelle ([facility])   Result Value Ref Range    V.zoster Immune Status Positive     Narrative    Test performed by:  [facility] [address] [place] [address]  Assay interference due to circulating antibodies against HIV, Hepatitis A,   Hepatitis B, Hepatitis C, HAMA and Rheumatoid Factor has not been evaluated.  The assay performance in detecting antibodies to Varicella Zoster in   individuals vaccinated with the FDA-licensed VZV vaccine has not been    established.  Negative: Absence of detectable Varicella Zoster IgG antibodies. A negative   result indicates no detectable VZV antibody, but does not rule out acute   infection. It should be noted that the test usually scores negative in   infected patients during the incubation period and the early stages of   infection.  Equivocal: Suggest recollection.  Positive: Presence of detectable Varicella Zoster IgG antibodies. A positive   result generally indicates exposure to the pathogen or administration of   specific immune-globulins, but it is no indication of active infection or   stage of disease.   Hepatic Profile ([facility])   Result Value Ref Range    Bilirubin Total 0.4 0.0 - 1.0 mg/dL    Bilirubin Direct 0.1 <=0.5 mg/dL    Protein Total 7.6 6.0 - 8.0 g/dL    Albumin 3.3 (L) 3.5 - 5.0 g/dL    Alkaline Phosphatase 62 45 - 120 U/L    AST (SGOT) 32 0 - 40 U/L    ALT (SGPT) 50 (H) 0 - 45 U/L    Narrative    Test performed by:  [facility] [address] [place] [address]   Lead With Demographics ([facility])   Result Value Ref Range    Lead, Blood (Venous) <2.0 0.0 - 4.9 ug/dL    Narrative    Test performed by:  [facility]  [address] [place] [id number]   HPV [place] PCR ([facility])   Result Value Ref Range    HPV Source SurePath     HPV 16 DNA Negative NEG    HPV 18 DNA Negative NEG    Other HR HPV Negative NEG    Final Diagnosis SEE NOTES     Specimen Description Cervical Cells     Narrative    Test performed by:  [facility]  [address], [place] [address]       Assessment & Plan  38 year old  at 23w5d with HARJIT 2019 based on  9w4d week US. Pregnancy complicated by language barrier, advanced maternal age, depression, chronic headaches.     Moo was seen today for prenatal care, fall and medication reconciliation.    Diagnoses and all orders for this visit:    Supervision of high-risk pregnancy of elderly  multigravida  Fall  FHT and fundal height appropriate on exam today. As patient did not hit her abdomen or her head and is not experiencing abdominal pain or vaginal bleeding and FHT normal with accelerations, will defer sending to [facility] for prolonged evaluation. Weight gain adequate: 5.806 kg (12 lb 12.8 oz) to date, out of recommended total of 11-20 lbs (pregravid BMI >30)  - Follow up in 2 weeks for syphilis, hemoglobin, glucose testing and additional monitoring of mood.      Acute pain of right knee  Patient has acute right knee pain after fall today. As pain is limiting ambulation, referral placed to orthopedics for additional evaluation and possible imaging. Initially recommended walk-in ortho clinic, but patient could not go today as she needed to  her children. Did place DME order for crutches. Recommended scheduled tylenol every 6 hours for the next few days and alternating applying heat and ice to the right knee.   -     ORTHOPEDICS ADULT REFERRAL; Future  -     order for DME; Equipment being ordered: Crutches (Height 5ft4in)        Return to clinic in 2 weeks.    Azul Olivares MD  I precepted today with Brandan Moreno MD.

## 2019-02-26 NOTE — PATIENT INSTRUCTIONS
- Take tylenol 625-1000 mg every 6 hours as needed for knee pain  - If your knee pain gets worse, call the clinic and we can see you sooner  - Can apply ice to knee for 20 minutes at a time (wrap ice packs in a towel, don't apply directly to the knee).   - Crutches and knee immobilizer today  - Orthopedics consult  - Follow up in 2 weeks for OB appt. We will do labs and glucose testing at the next appt.    CRUTCHES:  [facility]  [facility]  [id number]  [place] [address]  Phone: [phone]    APPT: Wed, 2/27/19 at 11:00 AM to  crutches    RIDE: [employer] will pick you up between 10:00 AM-10:15 AM    ORTHOPEDICS:  [facility]  [address] [place] [address]  Phone: [phone]    APPT: Thursday, 2/28/19 at 10:30 AM with Dr Chowdary for evaluation of rt knee pain    RIDE: [employer] will pick you up between 9:15-9:30 AM      [facility]  [address] [address]  Phone: [phone]    APPT: Monday, 3/4/19 at 1:30 PM for MRI  RIDE: [employer] will pick you up at 12:45 PM to take you to the appt.    [facility]  [address] [address]  Phone: [phone]    APPT: Tuesday, 3/5/19 at 2:00 PM with Dr Lees  RIDE: [employer] will pick you up at 1:15 PM to take you to the appt.      [facility]  [address] [address]  Phone: [phone]    APPT: Friday, 3/8/19 at 10:30 AM for MRI  RIDE: [employer] will pick you up at 9:30 AM to take you to the appt.      [facility]  [address] [address]  Phone: [phone]    APPT: Tuesday, 3/26/19 at 11:15 AM with Dr Nabeel HERNANDEZ: [employer] will pick you up at 11:15 AM to take you to the appt.    Patient Education     Reducing Knee Pain and  Swelling    Many treatments can help reduce pain and swelling in your knee. Your healthcare provider or physical therapist may suggest one or more of the following treatments:    Icing your knee. This helps reduce swelling. You may be asked to ice your knee once a day or more. Apply ice for about 15 to 20 minutes at a time, with at least 40 minutes between sessions. To make an ice pack, put ice cubes in a plastic bag that seals at the top. Wrap the bag in a clean, thin towel or cloth. Never put ice or an ice pack directly on the skin.    Keeping your leg raised above your heart. This helps excess fluid flow out of your knee joint to reduce swelling.    Compression. This means wrapping an elastic bandage or neoprene sleeve snugly around your knees. It keeps fluid from collecting in and around your knee joint.    Electrical stimulation. This is done by a physical therapist or . It can help reduce excess fluid in your knee joint.    Anti-inflammatory medicines. These may be prescribed by your healthcare provider. You may take pills or get shots (injections) in your knee.    Isometric (mandy) exercises. These strengthen the muscles that support your knee joint. They also help reduce excess fluid in your knee.    Massage. This helps fluid drain away from your knee.  Date Last Reviewed: 5/1/2018 2000-2018 [employer]. [address] [place] [address], [place] [address]. All rights reserved. This information is not intended as a substitute for professional medical care. Always follow your healthcare professional's instructions.             Patient Education     Adapting to Pregnancy: Second Trimester    Keep up the healthy habits you started in your first trimester. You might be a little more tired than normal. So plan your day wisely. Look at the tips below and choose the ones that suit your lifestyle.  If you have any questions, check with your healthcare provider.   If you work  If you can,  adjust your work with your employer to fit your needs. Try these tips:    If you stand for long periods, find ways to do some tasks while sitting. Also, try to stand with 1 foot resting on a low stool or ledge. Shift your weight from foot to foot often. Wear low-heeled shoes.    If you sit, keep your knees level with your hips. Rest your feet on a firm surface. Sit tall with support for your low back.    If you work long hours, ask about adjusting your schedule. Try taking shorter breaks more often.  When you travel  The second trimester may be the best time for any travel. Talk to your healthcare provider about any special plans you may need to make. Always:    Wear a seat belt. Fasten the lap part under your belly. Wear the shoulder part also.    Take breaks often during long trips by car or plane. Move around to stretch your legs.    Drink plenty of fluids on flights. The air in plane cabins is very dry.    Avoid hot climates or high altitudes if you are not used to them.    Avoid places where the food and water might make you sick.    Make sure you are up-to-date on all immunizations, including the flu vaccine. This is especially important when traveling overseas.  Taking time to relax  Find time to rest and relax at work or at home:    Take short time-outs daily. Do relaxation exercises.    Breathe deeply during stressful times.    Try not to take on too much. Plan tasks for times when you have the most energy.    Take naps when you can. Or just sit and relax.    After week 16, avoid lying on your back for more than a few minutes. Instead, lie on your side. Switch sides often.  Continuing as lovers  Unless your healthcare provider tells you otherwise, there is no reason to stop having sex now. Blood supply increases to the pelvic area in the second trimester. Because of this, sex might be more enjoyable. Try different positions and see what s best. Also, talk to your partner about any changes in desire.  Spotting may happen after sex. Be sure to let your healthcare provider know if there is heavy bleeding.  Keeping your environment safe  You can still clean house and use scented products. Just take some simple precautions:    Wear gloves when using cleaning fluids.    Open windows to let in fresh air. Use a fan if you paint.    Avoid secondhand smoke.    Don t breathe fumes from nail polish, hair spray, cleansers, or other chemicals.  Date Last Reviewed: 1/1/2018 2000-2018 [employer]. [address] [place] [address], [place] [address]. All rights reserved. This information is not intended as a substitute for professional medical care. Always follow your healthcare professional's instructions.

## 2019-02-26 NOTE — NURSING NOTE
Due to patient being non-English speaking/uses sign language, an  was used for this visit. Only for face-to-face interpretation by an external agency, date and length of interpretation can be found on the scanned worksheet.       name: Calvin Centeno (Htoo)  Language: Nia  Agency:  [employer]  Phone number: [phone]  Type of interpretation:  Face-to-face, spoken      Unable to get height and weight due to patient.

## 2019-02-27 NOTE — NURSING NOTE
2/26/19 1530 Called [facility] and pt can get her crutches there but they do not have knee immobilizers and would need to special order in which she could  on Monday.  Prescription needs to be faxed to [phone].    Discussed with Dr Moreno and Dr Olivares and annalise to just order the crutches.    Faxed rx to [facility] and set up ride for pt.    Called [facility] and set up appt for her to see Dr Chowdary on Thursday and set up ride./NG

## 2019-02-27 NOTE — NURSING NOTE
2/27/19 0900 Called [facility] to ensure that pt is able to  her crutches today and told Bronwyn that pt needs a Nia .    Gave info to Calvin Centeno (I) and he will let pt know./NG

## 2019-02-28 ENCOUNTER — TRANSFERRED RECORDS (OUTPATIENT)
Dept: HEALTH INFORMATION MANAGEMENT | Facility: CLINIC | Age: 39
End: 2019-02-28

## 2019-03-01 PROBLEM — M25.561 ACUTE PAIN OF RIGHT KNEE: Status: ACTIVE | Noted: 2019-03-01

## 2019-03-05 ENCOUNTER — TRANSFERRED RECORDS (OUTPATIENT)
Dept: HEALTH INFORMATION MANAGEMENT | Facility: CLINIC | Age: 39
End: 2019-03-05

## 2019-03-20 NOTE — PROGRESS NOTES
Subjective  Concerns:  Recent Fall: Patient fell on ice approximately 3 weeks ago. Referral was placed to orthopedics. She did see orthopedics and was found to have patellar fracture in 3 locations per patient report. She was unable to make her follow up appointment as her scheduled ride was not Nia speaking and left without her. Her knee pain is significantly improved since initial injury. She is taking Tylenol for pain every 6 hours as needed for pain and this is helping significantly.      Mood: Feeling that her mood is getting better. No concerns today.    ROS:  YES, chronic. Getting a little better with regular tylenol use.  - Headache  No - Changes in vision  No - Chest Pain  No - Shortness of Breath  No - Nausea   No - Vomiting  Some gas pain - Abdominal pain   No - Contractions  No - Dysuria   No - Vaginal Discharge    No - Vaginal bleeding   No - Loss of Fluid   YES - Extremity swelling   Present - Fetal movement     Going to WIC? Yes      Risk Assessment   Average Risk Category  No significant risk factors: No     At Risk Category (up to 3)  Teen pregnancy: No  Poor social situation: Yes  Domestic abuse: No  Financial difficulties: No  Smoker: No  H/O  deliver: No  H/O drug abuse: No  Non-English speaking: Yes  Advanced maternal age: Yes  GDM risks: Yes  Previous C/S: No  H/O PIH: No  H/O STIs: No  H/O mental health concerns: Yes  Onset care > 20 weeks: No    High Risk Category (4 or more At Risk or)  Diabetes/GDM: No  Multiple gestation: No  Chronic hypertension: No  Significant hx of asthma: No  Fetal demise > 20 weeks: No  Positive tox screen: No  Current mental health treatment: No      Risk: At Risk   Date determined: 3/20/2019    Patient Active Problem List   Diagnosis     Absolute glaucoma, right eye     Blind right eye     Lung granuloma (H)     Microphthalmia with cataract     Migraine     Supervision of high-risk pregnancy of elderly multigravida     AMA (advanced maternal age)  multigravida 35+     Language barrier, cultural differences     Betel deposit on teeth     Hyperemesis gravidarum     Acute pain of right knee       Moo Say speaks Nia so an  was used today.    Guidance:  GDM  signs of  labor    Objective  LMP 2018 (Within Days)   No distress.  Gravid abdomen.  's  Fundal height 27.5 cm.  no edema.    Results  Blood type: A POS  Results for orders placed or performed in visit on 18   ABO/Rh Type-HML ([facility])   Result Value Ref Range    ABO/Rh(D) A POS     Repeat ABO/Rh Typing ([facility]) A POS     Narrative    Test performed by:   [facility] [address], [place] [address]   Antibody Screen ([facility])   Result Value Ref Range    Antibody Screen Negative Negative    Narrative    Test performed by:   [facility] [address], [place] [address]   Chlamydia/Gono Amplified ([facility])   Result Value Ref Range    Chlamydia trac,Amplified Prb Negative Negative    N gonorrhoeae,Amplified Prb Negative Negative    Narrative    Test performed by:  [facility] [address] [place] [address]   CBC with Plt ([facility])   Result Value Ref Range    WBC 11.4 (H) 4.0 - 11.0 K/uL    RBC 4.2 3.8 - 5.2 M/uL    Hemoglobin 13.1 11.7 - 15.7 g/dL    Hematocrit 41.9 35.0 - 47.0 %    .5 (H) 78.0 - 100.0 fL    MCH 31.4 26.5 - 35.0    MCHC 31.3 (L) 32.0 - 36.0 g/dL    Platelets 266.0 150.0 - 450.0 K/uL   Culture Urine ([facility])   Result Value Ref Range    Culture SEE RESULTS BELOW     Narrative    Test performed by:  [facility] [address] [place] [address]   Hepatitis B Surface Ag ([facility])   Result Value Ref Range    Hepatitis B Surface Antigen Negative Negative    Narrative    Test performed by:  [facility] [address] [place] [address]   HIV Ag/Ab Screen [place] ([facility])   Result Value Ref Range    HIV Antigen/Antibody Negative Negative    Narrative    Test performed by:    [facility] [address] [place] [address]  Method is Abbott HIV Ag/Ab for the detection of HIV p24 antigen, HIV-1   antibodies and HIV-2 antibodies.   Lead, Blood ([facility])   Result Value Ref Range    Lead See Note. <5.0 ug/dL    Collection Method Venous     Lead Retest No     Narrative    Test performed by:  [facility] [address] [place] [address]   Rubella  IgG ([facility])   Result Value Ref Range    Rubella IgG Positive     Narrative    Test performed by:  [facility] [address] [place] [address]  Negative: Absence of detectable rubella virus IgG antibodies. A negative   result presumes that immunity has not been acquired.   Equivocal: Suggest recollection.  Positive: Considered positive for IgG antibodies to rubella virus.   Syphilis Screen [place] ([facility])   Result Value Ref Range    Treponema Antibody (Syphilis) Negative Negative    Narrative    Test performed by:  [facility] [address] [place] [address]   Urinalysis([facility])   Result Value Ref Range    Specific Gravity Urine >=1.030 1.005 - 1.030    pH Urine 6.5 4.5 - 8.0    Leukocyte Esterase UR Negative NEGATIVE    Nitrite Urine Negative NEGATIVE    Protein UR 1+ (A) NEGATIVE    Glucose Urine Negative NEGATIVE    Ketones Urine Negative NEGATIVE    Urobilinogen mg/dL 0.2 E.U./dL 0.2 E.U./dL    Bilirubin UR Negative NEGATIVE    Blood UR Negative NEGATIVE   GYN Cytology ([facility])   Result Value Ref Range    Lab AP Case Report SEE RESULTS BELOW     Lab AP Gyn Interpretation SEE RESULTS BELOW Negative for squamous intraepithelial le    Lab AP Malignant? Normal Normal    Specimen adequacy:       Satisfactory for evaluation, endocervical/transformation zone component present    HPV Reflex? Yes regardless of result     High Risk? No     Last Mens Period Unknown, currently 9w pregnant     Lab AP Abnormal Bleeding No     Lab AP Patient Status Pregnant     Lab AP Birth  Control/Hormones None     Lab AP Previous Normal Unknown     Lab AP Previous Abnl No     Lab AP Cervical Appearance Normal     Narrative    Test performed by:  [facility] [address] [place] [address]   Hepatitis B Surface Ab ([facility])   Result Value Ref Range    Hepatitis B Surface Antibody Positive (A) Negative    Narrative    Test performed by:  [facility] [address] [place] [address]   Hemoglobin A1c ([facility])   Result Value Ref Range    Hemoglobin A1C 5.1 4.1 - 5.7 %   Eunice Zoster Imm Status Emmanuelle ([facility])   Result Value Ref Range    V.zoster Immune Status Positive     Narrative    Test performed by:  [facility] [address] [place] [address]  Assay interference due to circulating antibodies against HIV, Hepatitis A,   Hepatitis B, Hepatitis C, HAMA and Rheumatoid Factor has not been evaluated.  The assay performance in detecting antibodies to Varicella Zoster in   individuals vaccinated with the FDA-licensed VZV vaccine has not been   established.  Negative: Absence of detectable Varicella Zoster IgG antibodies. A negative   result indicates no detectable VZV antibody, but does not rule out acute   infection. It should be noted that the test usually scores negative in   infected patients during the incubation period and the early stages of   infection.  Equivocal: Suggest recollection.  Positive: Presence of detectable Varicella Zoster IgG antibodies. A positive   result generally indicates exposure to the pathogen or administration of   specific immune-globulins, but it is no indication of active infection or   stage of disease.   Hepatic Profile ([facility])   Result Value Ref Range    Bilirubin Total 0.4 0.0 - 1.0 mg/dL    Bilirubin Direct 0.1 <=0.5 mg/dL    Protein Total 7.6 6.0 - 8.0 g/dL    Albumin 3.3 (L) 3.5 - 5.0 g/dL    Alkaline Phosphatase 62 45 - 120 U/L    AST (SGOT) 32 0 - 40 U/L    ALT (SGPT) 50 (H) 0 - 45 U/L    Narrative     Test performed by:  [facility]  [address] [place] [address]   Lead With Demographics ([facility])   Result Value Ref Range    Lead, Blood (Venous) <2.0 0.0 - 4.9 ug/dL    Narrative    Test performed by:  [facility]  [address] [place] [address]   HPV Jenkins PCR [facility]   Result Value Ref Range    HPV Source SurePath     HPV 16 DNA Negative NEG    HPV 18 DNA Negative NEG    Other HR HPV Negative NEG    Final Diagnosis SEE NOTES     Specimen Description Cervical Cells     Narrative    Test performed by:  [facility]  [address], [place] [address]       Assessment & Plan  39 year old  at 26w6d with HARJIT 2019 based on 9w4d ultrasound. Pregnancy Complicated by language barrier, advanced maternal age, depression and chronic headaches.      Ed was seen today for prenatal care.    Diagnoses and all orders for this visit:    Supervision of high-risk pregnancy of elderly multigravida   GTT, syphilis and hemoglobin screening today.   -     Hemoglobin (HGB) ([facility])  -     Syphilis Screen [place] (RPR/VDRL) ([facility])  -     glucose tolerance test (GLUCOLA) 16% oral liquid 50 g    Right patellar fracture  Records not yet received from [place]. Will discuss coordination for follow-up appointment with OB coordinator.   - Continue tylenol as needed for pain relief.     Weight gain adequate: 5.806 kg (12 lb 12.8 oz) to date, out of recommended total of 11-20 lbs (pregravid BMI >30)    Return to clinic in 3 weeks.    Azul Olivares MD  I precepted today with Beatriz Cee MD.

## 2019-03-21 ENCOUNTER — OFFICE VISIT (OUTPATIENT)
Dept: FAMILY MEDICINE | Facility: CLINIC | Age: 39
End: 2019-03-21
Payer: COMMERCIAL

## 2019-03-21 DIAGNOSIS — O09.529 SUPERVISION OF HIGH-RISK PREGNANCY OF ELDERLY MULTIGRAVIDA: ICD-10-CM

## 2019-03-21 DIAGNOSIS — Z23 NEED FOR VACCINATION: Primary | ICD-10-CM

## 2019-03-21 LAB
GLU GEST SCREEN 1HR 50G: 92 (ref 0–129)
HEMOGLOBIN: 11.2 G/DL (ref 11.7–15.7)

## 2019-03-21 NOTE — LETTER
May 3, 2019      Moo Say  [address]  [place] [address]        Dear Ed,    Your lab results are back and are generally okay. Your hemoglobin did drop some from the initial test. It is not low enough that we need to add an iron supplement at this time, but it is important that you continue to take your prenatal vitamin every day. Please let us know if you have additional questions or concerns.    Please see below for your test results.    Resulted Orders   Hemoglobin (HGB) ([facility])   Result Value Ref Range    Hemoglobin 11.2 (L) 11.7 - 15.7 g/dL   Syphilis Screen [place] (RPR/VDRL) ([facility])   Result Value Ref Range    Treponema Antibody (Syphilis) Negative Negative    Narrative    Test performed by:  [facility] [address] [place] [address]   Glucose Challenge  1 Hr  ([facility])   Result Value Ref Range    Glu Gest Screen 1hr 50g 92 0 - 129       If you have any questions, please call the clinic to make an appointment.    Sincerely,    Azul Olivares MD

## 2019-03-21 NOTE — NURSING NOTE
Due to patient being non-English speaking/uses sign language, an  was used for this visit. Only for face-to-face interpretation by an external agency, date and length of interpretation can be found on the scanned worksheet.       name: Calvin Centeno (Htoo)  Language: Nia  Agency:  [employer]  Phone number: [phone]  Type of interpretation:  Face-to-face, spoken

## 2019-03-21 NOTE — PATIENT INSTRUCTIONS
- Follow up in 3 weeks  - I will talk to Jenni about Orthopedics appointment and transportation to [facility]         Patient Education     Adapting to Pregnancy: Second Trimester    Keep up the healthy habits you started in your first trimester. You might be a little more tired than normal. So plan your day wisely. Look at the tips below and choose the ones that suit your lifestyle.  If you have any questions, check with your healthcare provider.   If you work  If you can, adjust your work with your employer to fit your needs. Try these tips:    If you stand for long periods, find ways to do some tasks while sitting. Also, try to stand with 1 foot resting on a low stool or ledge. Shift your weight from foot to foot often. Wear low-heeled shoes.    If you sit, keep your knees level with your hips. Rest your feet on a firm surface. Sit tall with support for your low back.    If you work long hours, ask about adjusting your schedule. Try taking shorter breaks more often.  When you travel  The second trimester may be the best time for any travel. Talk to your healthcare provider about any special plans you may need to make. Always:    Wear a seat belt. Fasten the lap part under your belly. Wear the shoulder part also.    Take breaks often during long trips by car or plane. Move around to stretch your legs.    Drink plenty of fluids on flights. The air in plane cabins is very dry.    Avoid hot climates or high altitudes if you are not used to them.    Avoid places where the food and water might make you sick.    Make sure you are up-to-date on all immunizations, including the flu vaccine. This is especially important when traveling overseas.  Taking time to relax  Find time to rest and relax at work or at home:    Take short time-outs daily. Do relaxation exercises.    Breathe deeply during stressful times.    Try not to take on too much. Plan tasks for times when you have the most energy.    Take naps when you can. Or  just sit and relax.    After week 16, avoid lying on your back for more than a few minutes. Instead, lie on your side. Switch sides often.  Continuing as lovers  Unless your healthcare provider tells you otherwise, there is no reason to stop having sex now. Blood supply increases to the pelvic area in the second trimester. Because of this, sex might be more enjoyable. Try different positions and see what s best. Also, talk to your partner about any changes in desire. Spotting may happen after sex. Be sure to let your healthcare provider know if there is heavy bleeding.  Keeping your environment safe  You can still clean house and use scented products. Just take some simple precautions:    Wear gloves when using cleaning fluids.    Open windows to let in fresh air. Use a fan if you paint.    Avoid secondhand smoke.    Don t breathe fumes from nail polish, hair spray, cleansers, or other chemicals.  Date Last Reviewed: 1/1/2018 2000-2018 [employer]. [address] [place] [address]. All rights reserved. This information is not intended as a substitute for professional medical care. Always follow your healthcare professional's instructions.         [facility]  [address] Radiology  [place] [id number]  Phone: [phone]     APPT: Monday, 3/25/19 at 10:00 AM for MRI  RIDE: [employer] will pick you up at 9:00 AM to take you to the appt.        [facility]  [address] [place] [id number]  Phone: [phone]     APPT: Tuesday, 3/26/19 at 11:15 AM with Dr Nabeel HERNANDEZ: [employer] will pick you up at 11:15 AM to take you to the appt.    OB Check-up: Thursday, 4/18/19 at 3:10 PM with Dr Marshall HERNANDEZ: [employer] will pick you up at 2:25 PM to take you to the appt.  Trip # [id number]    Maternal Fetal Medicine  [facility] [address]  [place] [address]    PHONE: [phone]    APPT: Friday, 4/26/19 at 10:15 AM for ultrasound  RIDE: [employer] will pick you up at 9:30 AM  Trip # [id number]

## 2019-03-22 LAB — TREPONEMA ANTIBODY (SYPHILIS): NEGATIVE

## 2019-03-25 NOTE — RESULT ENCOUNTER NOTE
Please call patient with results.    Moo Say,    Your lab results are back and are generally okay. Your hemoglobin did drop some from the initial test. It is not low enough that we need to add an iron supplement at this time, but it is important that you continue to take your prenatal vitamin every day. Please let us know if you have additional questions or concerns.    Sincerely,    Dr. Azul Olivares

## 2019-03-26 ENCOUNTER — TRANSFERRED RECORDS (OUTPATIENT)
Dept: HEALTH INFORMATION MANAGEMENT | Facility: CLINIC | Age: 39
End: 2019-03-26

## 2019-04-18 ENCOUNTER — OFFICE VISIT (OUTPATIENT)
Dept: FAMILY MEDICINE | Facility: CLINIC | Age: 39
End: 2019-04-18
Payer: COMMERCIAL

## 2019-04-18 VITALS
WEIGHT: 212 LBS | SYSTOLIC BLOOD PRESSURE: 114 MMHG | HEART RATE: 73 BPM | OXYGEN SATURATION: 98 % | BODY MASS INDEX: 36.33 KG/M2 | DIASTOLIC BLOOD PRESSURE: 72 MMHG | RESPIRATION RATE: 16 BRPM | TEMPERATURE: 98.2 F

## 2019-04-18 DIAGNOSIS — O09.529 AMA (ADVANCED MATERNAL AGE) MULTIGRAVIDA 35+: ICD-10-CM

## 2019-04-18 DIAGNOSIS — O09.529 SUPERVISION OF HIGH-RISK PREGNANCY OF ELDERLY MULTIGRAVIDA: Primary | ICD-10-CM

## 2019-04-18 NOTE — PROGRESS NOTES
Subjective  Concerns:   - No concerns today.  - Denies any mood issues today, noted on problem list.     ROS:  No - Headache  No - Changes in vision, chronic right eye issues.   No - Chest Pain  No - Shortness of Breath  No - Nausea   No - Vomiting  No - Abdominal pain   No - Contractions  No - Dysuria   No - Vaginal Discharge    No - Vaginal bleeding   No - Loss of Fluid   No - Extremity swelling   Present - Fetal movement     Going to WIC? Yes    Patient Active Problem List   Diagnosis     Absolute glaucoma, right eye     Blind right eye     Lung granuloma (H)     Microphthalmia with cataract     Migraine     Supervision of high-risk pregnancy of elderly multigravida     AMA (advanced maternal age) multigravida 35+     Language barrier, cultural differences     Betel deposit on teeth     Hyperemesis gravidarum     Acute pain of right knee     Moo Say speaks Nia so an  was used today.    Guidance:  Seatbelt use - Discussed.   Growth ultrasound on     Do you need help getting a car seat? No  Do you need help getting a breast pump? No    Objective  /72   Pulse 73   Temp 98.2  F (36.8  C) (Oral)   Resp 16   Wt 96.2 kg (212 lb)   LMP 2018 (Within Days)   SpO2 98%   BMI 36.33 kg/m    No distress.  Gravid abdomen.   - 145.  Fundal height 31.5 cm.  no edema.    Results  Blood type: A POS  Results for orders placed or performed in visit on 19   Hemoglobin (HGB) ([facility])   Result Value Ref Range    Hemoglobin 11.2 (L) 11.7 - 15.7 g/dL   Syphilis Screen [place] (RPR/VDRL) ([facility])   Result Value Ref Range    Treponema Antibody (Syphilis) Negative Negative    Narrative    Test performed by:  [facility]  [address] [place] [address]   Glucose Challenge  1 Hr  ([facility])   Result Value Ref Range    Glu Gest Screen 1hr 50g 92 0 - 129     Assessment & Plan  39 year old  at 31w0d with HARJIT 2019 based on 9w4d ultrasound. Pregnancy  Complicated by language barrier, advanced maternal age, depression and chronic headaches.    Moo was seen today for prenatal care.    Diagnoses and all orders for this visit:  #Supervision of high-risk pregnancy of elderly multigravida  AMA status, will need to be followed up with weekly BPP and NSTs after 36 weeks. No anomalies were detected on a 22 week ultrasound.   - Will need follow up Imaging on 4/26 with MFM, information about this is provided and patient is aware.   - Has been off her prenatal pills for the past month, discussed resuming this.     #Patellar fracture  Patient with hx of fall on 2/26/19 was diagnosed with a patellar fracture. Did follow up with Ortho but no surgery was recommended. Continues to have intermittent pain but otherwise doing well. Had been recommended to do PT however patient reports that she has had difficulty getting around due to pregnancy related fatigue. She would like to wait on this until after delivery.   - Consider PT in future if continues to endorse pain.     #Breech presentation  Continue to follow this on subsequent ultrasound.     #Weight gain  Weight gain adequate: 12.2 kg (27 lb) to date, out of recommended total of 11-20 lbs (pregravid BMI >30)    Patient Instructions   Thank you for coming to [facility] for your care. It was a pleasure to take care of you!    - Great job taking care of yours and your baby's health  - Be sure to go to your US appointment on 4/26 at [facility] ([facility], [address], [place] [address] - [phone])  - Continue taking your prenatals  - Remember to only use Tylenol for your pain. Avoid Ibuprofen.  - Return to clinic in 2 weeks for follow up.  - Call the clinic or call the [facility] line below if you have you have any of the danger signs that we discussed including:-    - Increased abdominal pain   - Loss of fluid   - Vaginal bleeding   - Decreased fetal movement (less  than 10 movements within 2 hours)        - Contractions (occuring every 5 minutes and lasting between 60 - 90 seconds)    [facility]   Phone: [phone]  Address: [facility]  2nd floor [address] [address]    Lyssa Connors MD                Return to clinic in 2 weeks.    Lyssa Connors MD  I precepted today with Genaro Garza MD.

## 2019-04-18 NOTE — PROGRESS NOTES
Preceptor Attestation:   Patient seen, evaluated and discussed with the resident. I have verified the content of the note, which accurately reflects my assessment of the patient and the plan of care.   Supervising Physician:  Genaro Garza MD

## 2019-04-18 NOTE — NURSING NOTE
Due to patient being non-English speaking/uses sign language, an  was used for this visit. Only for face-to-face interpretation by an external agency, date and length of interpretation can be found on the scanned worksheet.       name: Calvin Centeno (Htoo)  Language: Nia  Agency:  [employer]  Phone number: [phone]  Type of interpretation:  Face-to-face, spoken

## 2019-04-18 NOTE — PATIENT INSTRUCTIONS
Thank you for coming to [facility] for your care. It was a pleasure to take care of you!    - Great job taking care of yours and your baby's health  - Be sure to go to your US appointment on 4/26 at [facility] ([facility], [address], [place] [address] - [phone])  - Continue taking your prenatals  - Remember to only use Tylenol for your pain. Avoid Ibuprofen.  - Return to clinic in 2 weeks for follow up.  - Call the clinic or call the [facility] line below if you have you have any of the danger signs that we discussed including:-    - Increased abdominal pain   - Loss of fluid   - Vaginal bleeding   - Decreased fetal movement (less than 10 movements within 2 hours)        - Contractions (occuring every 5 minutes and lasting between 60 - 90 seconds)    [facility]   Phone: [phone]  Address: [facility]  2nd floor [address] [address]    Lyssa Connors MD

## 2019-04-26 ENCOUNTER — RECORDS - HEALTHEAST (OUTPATIENT)
Dept: ULTRASOUND IMAGING | Facility: HOSPITAL | Age: 39
End: 2019-04-26

## 2019-04-26 ENCOUNTER — RECORDS - HEALTHEAST (OUTPATIENT)
Dept: ADMINISTRATIVE | Facility: OTHER | Age: 39
End: 2019-04-26

## 2019-04-26 ENCOUNTER — OFFICE VISIT - HEALTHEAST (OUTPATIENT)
Dept: MATERNAL FETAL MEDICINE | Facility: HOSPITAL | Age: 39
End: 2019-04-26

## 2019-04-26 DIAGNOSIS — O09.523 ELDERLY MULTIGRAVIDA IN THIRD TRIMESTER: ICD-10-CM

## 2019-04-26 DIAGNOSIS — O09.529 SUPERVISION OF ELDERLY MULTIGRAVIDA, UNSPECIFIED TRIMESTER: ICD-10-CM

## 2019-05-05 NOTE — PROGRESS NOTES
Subjective  Concerns: Headache on the top of the head. Reports that she didn't sleep well overnight and thinks this is making it worse. Throbbing pain. Some days worse than others. Has been using tylenol, but ran out.. She has been taking this for leg pain every 6 hours for the last several weeks.     Right knee pain: Has been taking tylenol regularly for knee pain. Pain is a little better. Feels that is too hard to get to therapy right now.     Still not taking her prenatal vitamins because she was unable to get them from the pharmacy.       ROS:  YES, described above - Headache  No - Changes in vision  No - Chest Pain  No - Shortness of Breath  YES, still occasionally - Nausea   YES, did have vomiting a week or two ago - Vomiting  YES, lower abdominal pain, occasional - Abdominal pain   No - Contractions  No - Dysuria   No - Vaginal Discharge    No - Vaginal bleeding   No - Loss of Fluid   No - Extremity swelling   Present - Fetal movement     Going to WIC? Yes    Patient Active Problem List   Diagnosis     Absolute glaucoma, right eye     Blind right eye     Lung granuloma (H)     Microphthalmia with cataract     Migraine     Supervision of high-risk pregnancy of elderly multigravida     AMA (advanced maternal age) multigravida 35+     Language barrier, cultural differences     Betel deposit on teeth     Hyperemesis gravidarum     Acute pain of right knee       Moo Say speaks Nia so an  was used today.    Guidance:   birth control. Not interested in having a tubal ligation.   travel  warning signs/PIH    Do you need help getting a car seat? No.   Do you need help getting a breast pump? No      Objective  LMP 09/07/2018 (Within Days)   No distress.  Gravid abdomen.  -150.  Fundal height 34.5 cm.  No edema.    Results  Blood type: A POS  Results for orders placed or performed in visit on 03/21/19   Hemoglobin (HGB) (P )   Result Value Ref Range    Hemoglobin 11.2 (L) 11.7 - 15.7 g/dL    Syphilis Screen [place] (RPR/VDRL) ([facility])   Result Value Ref Range    Treponema Antibody (Syphilis) Negative Negative    Narrative    Test performed by:  [facility]  [address] [place] [address]   Glucose Challenge  1 Hr  ([facility])   Result Value Ref Range    Glu Gest Screen 1hr 50g 92 0 - 129       Assessment & Plan  39 year old  at 33w3d with HARJIT 2019 based on 9w4d US. Pregnancy is complicated by AMA, depression, chronic headaches, and language barrier.     Ed was seen today for prenatal care, headache and medication reconciliation.    Diagnoses and all orders for this visit:    Supervision of high-risk pregnancy of elderly multigravida  Language barrier, cultural differences  Breech position on last ultrasound  She has been unable to get her prenatal vitamins from the pharmacy. Planning on calling the pharmacy today. Has been following with MFM for AMA. No anomalies on 22 week ultrasound. Will have repeat ultrasound 2019 and then weekly BPPs and NST's after 36 weeks  -     Prenatal Vit-Fe Fumarate-FA (PRENATAL MULTIVITAMIN W/IRON) 27-0.8 MG tablet; Take 1 tablet by mouth daily    Migraine without status migrainosus, not intractable, unspecified migraine type  Discussed trying to take tylenol less frequently as it is possible that she is getting rebound headaches. Also encouraged increased water intake.   -     acetaminophen (TYLENOL) 500 MG tablet; Take 1-2 tablets (500-1,000 mg) by mouth every 6 hours as needed for mild pain    Right chronic patellar fracture  Patient had a fall in February and imaging revealed chronic patellar fracture. She is still having some pain and has been using tylenol regularly.   - Physical therapy after delivery per patient preference      Weight gain excessive: 12.2 kg (27 lb) to date, out of recommended total of 11-20 lbs (pregravid BMI >30)    There are no Patient Instructions on file for this visit.    Return to clinic in 2  weeks.    Azul Olivares MD, PGY1  [facility]    Patient discussed with Dr. Beatriz Cee who agrees with the assessment and plan.

## 2019-05-06 ENCOUNTER — OFFICE VISIT (OUTPATIENT)
Dept: FAMILY MEDICINE | Facility: CLINIC | Age: 39
End: 2019-05-06
Payer: COMMERCIAL

## 2019-05-06 VITALS
RESPIRATION RATE: 20 BRPM | WEIGHT: 216 LBS | SYSTOLIC BLOOD PRESSURE: 119 MMHG | DIASTOLIC BLOOD PRESSURE: 82 MMHG | OXYGEN SATURATION: 97 % | HEART RATE: 67 BPM | BODY MASS INDEX: 37.01 KG/M2 | TEMPERATURE: 98 F

## 2019-05-06 DIAGNOSIS — Z60.3 LANGUAGE BARRIER, CULTURAL DIFFERENCES: ICD-10-CM

## 2019-05-06 DIAGNOSIS — O09.529 SUPERVISION OF HIGH-RISK PREGNANCY OF ELDERLY MULTIGRAVIDA: Primary | ICD-10-CM

## 2019-05-06 DIAGNOSIS — O09.523 ELDERLY MULTIGRAVIDA IN THIRD TRIMESTER: ICD-10-CM

## 2019-05-06 DIAGNOSIS — G43.909 MIGRAINE WITHOUT STATUS MIGRAINOSUS, NOT INTRACTABLE, UNSPECIFIED MIGRAINE TYPE: ICD-10-CM

## 2019-05-06 RX ORDER — PRENATAL VIT/IRON FUM/FOLIC AC 27MG-0.8MG
1 TABLET ORAL DAILY
Qty: 100 TABLET | Refills: 3 | Status: SHIPPED | OUTPATIENT
Start: 2019-05-06 | End: 2019-06-10

## 2019-05-06 RX ORDER — ACETAMINOPHEN 500 MG
500-1000 TABLET ORAL EVERY 6 HOURS PRN
Qty: 90 TABLET | Refills: 3 | Status: SHIPPED | OUTPATIENT
Start: 2019-05-06 | End: 2019-08-22

## 2019-05-06 SDOH — SOCIAL STABILITY - SOCIAL INSECURITY: ACCULTURATION DIFFICULTY: Z60.3

## 2019-05-06 NOTE — PATIENT INSTRUCTIONS
Call the clinic or [facility] ([phone]) if you have any of the signs/symptoms described below:  - Loss of fluid  - Vaginal bleeding  - Decreased fetal movement (less than 10 movements in 2 hours)  - Contractions (occurring every 5 minutes and lasting 60 - 90 seconds)  - Severe headache, vision changes, increased abdominal pain, significant change in swelling in your hands our feet.     -Follow up in 2 weeks  -Try increasing water intake to help with headache, nausea.       Patient Education     Adapting to Pregnancy: Third Trimester    Although common during pregnancy, some discomforts may seem worse in the final weeks. Simple lifestyle changes can help. Take care of yourself. And ask your partner to help out with small tasks.  Limiting leg problems  Ways to combat leg issues:    Wear support hose all day.    Avoid snug shoes and clothes that bind, like tight pants and socks with elastic tops.    Sit with your feet and legs raised often.  Caring for your breasts  Tips to follow include:    Wash with plain water. Avoid using harsh soaps or rubbing alcohol. They may cause dryness.    Wear a nursing bra for extra support. It can also hide any leaks from your nipples.  Controlling hemorrhoids  Ways to avoid hemorrhoids include:    Eat foods that are high in fiber. Also, exercise and drink enough fluids. This will reduce constipation and hemorrhoids.    Sleep and nap on your side. This limits pressure on the veins of your rectum.    Try not to stand or sit for long periods.  Controlling back pain  As your body changes during pregnancy, your back must work in new ways. Back pain is due to many causes. Physical changes in your body can strain your back and its supporting muscles. Also, hormones (chemicals that carry messages throughout the body) increase during pregnancy. This can affect how your muscles and joints work together. All of these changes can lead to pain. Pain may be felt in the upper  or lower back. Pain is also common in the pelvis. Some pregnant women have sciatica. This is pain caused by pressure on the sciatic nerve running down the back of the leg. Ask your healthcare provider for specific tips and exercises to help control your back pain.  Tips to help you rest  Good rest and sleep will help you feel better. Here are some ideas:    Ask your partner to massage your shoulders, neck, or back.    Limit the errands you do each day.    Lie down in the afternoon or after work for a few minutes.    Take a warm bath before you go to sleep.    Drink warm milk or teas without caffeine.    Avoid coffee, black tea, and cola.  Stopping heartburn    Avoid spicy, greasy, fried, or acidic foods.    Eat small amounts more often. Eat slowly.   Wait 2 hours after eating before lying down.    Sleep with your upper body raised 6 inches.   Managing mood swings  Ways to manage mood swings include:    Know that mood changes are normal.    Exercise often, but get plenty of rest.    Address any concerns and limit stress. Talking to your partner, other women, or your healthcare provider may help.  Dealing with urinary frequency  Tips to deal with having to urinate often include:    Drink plenty of water all day. If you drink a lot in the evening, though, you may have to get up more in the night.    Limit coffee, black tea, and cola.  Date Last Reviewed: 2/1/2018 2000-2018 [employer]. [address] [place] [address], [place] [address]. All rights reserved. This information is not intended as a substitute for professional medical care. Always follow your healthcare professional's instructions.

## 2019-05-06 NOTE — NURSING NOTE
Due to patient being non-English speaking/uses sign language, an  was used for this visit. Only for face-to-face interpretation by an external agency, date and length of interpretation can be found on the scanned worksheet.       name: Calvin Centeno (Htoo)  Language: Nia  Agency:  [employer]  Phone number: [phone]  Type of interpretation:  Face-to-face, spoken

## 2019-05-22 ENCOUNTER — OFFICE VISIT (OUTPATIENT)
Dept: FAMILY MEDICINE | Facility: CLINIC | Age: 39
End: 2019-05-22
Payer: COMMERCIAL

## 2019-05-22 VITALS
WEIGHT: 217.8 LBS | RESPIRATION RATE: 16 BRPM | HEART RATE: 83 BPM | SYSTOLIC BLOOD PRESSURE: 101 MMHG | OXYGEN SATURATION: 98 % | TEMPERATURE: 98.2 F | BODY MASS INDEX: 37.32 KG/M2 | DIASTOLIC BLOOD PRESSURE: 69 MMHG

## 2019-05-22 DIAGNOSIS — O09.529 AMA (ADVANCED MATERNAL AGE) MULTIGRAVIDA 35+: ICD-10-CM

## 2019-05-22 DIAGNOSIS — O09.529 SUPERVISION OF HIGH-RISK PREGNANCY OF ELDERLY MULTIGRAVIDA: Primary | ICD-10-CM

## 2019-05-22 DIAGNOSIS — O26.843 UTERINE SIZE DATE DISCREPANCY PREGNANCY, THIRD TRIMESTER: ICD-10-CM

## 2019-05-22 NOTE — PROGRESS NOTES
Subjective  Concerns: Feels heavier, but otherwise feels well. Has growth US scheduled in 2 days.     ROS:  YES, unchanged, intermittent, tylenol helps - Headache  No - Changes in vision  No - Chest Pain  No - Shortness of Breath  No - Nausea   No - Vomiting  No - Abdominal pain   No - Contractions  No - Dysuria   No - Vaginal Discharge    No - Vaginal bleeding   No - Loss of Fluid   No - Extremity swelling   Present - Fetal movement     Going to WIC? Yes    Patient Active Problem List   Diagnosis     Absolute glaucoma, right eye     Blind right eye     Lung granuloma (H)     Microphthalmia with cataract     Migraine     Supervision of high-risk pregnancy of elderly multigravida     AMA (advanced maternal age) multigravida 35+     Language barrier, cultural differences     Betel deposit on teeth     Hyperemesis gravidarum     Acute pain of right knee       Moo Say speaks Nia so an  was used today.    Guidance:  birth control - doesn't like nexplanon, or depo (irregular periods lasting all month). Thinking about pill.   warning signs/PIH    Do you need help getting a car seat? No - has already  Do you need help getting a breast pump? YES - is afraid she won't know how to use it.     Objective  /69 (BP Location: Left arm, Patient Position: Sitting, Cuff Size: Adult Large)   Pulse 83   Temp 98.2  F (36.8  C) (Oral)   Resp 16   Wt 98.8 kg (217 lb 12.8 oz)   LMP 09/07/2018 (Within Days)   SpO2 98%   BMI 37.32 kg/m    No distress.  Gravid abdomen.  .  Fundal height 40 cm.  no edema.  Difficult to tell position by Leopold's - possibly cephalic.     Results  Blood type: A POS  Results for orders placed or performed in visit on 03/21/19   Hemoglobin (HGB) ([facility])   Result Value Ref Range    Hemoglobin 11.2 (L) 11.7 - 15.7 g/dL   Syphilis Screen [place] (RPR/VDRL) ([facility])   Result Value Ref Range    Treponema Antibody (Syphilis) Negative Negative    Narrative    Test performed by:    [facility]  [address] [place] [address]   Glucose Challenge  1 Hr  ([facility])   Result Value Ref Range    Glu Gest Screen 1hr 50g 92 0 - 129       Assessment & Plan  39 year old  at 35w6d with HARJIT 2019 based on 9 week US    Moo was seen today for prenatal care and medication reconciliation.    Diagnoses and all orders for this visit:    1. Supervision of high-risk pregnancy of elderly multigravida  Doing well overall. Has a growth US scheduled with MFM in 2 days. Breech on last US, feels cephalic on today's Leopold's, will reassess with US in 2 days. Per MFM weekly BPP/NSTs starting at 36 weeks which are already scheduled, and recommended delivery between 39 and 40 weeks. Discussed breastfeeding today, patient opts to try breastfeeding and try breast pump, intimated by breast pump so counseled to bring to hospital. GBS today. Back next week for BPP/NST and 2 weeks for provider  - order for DME; Equipment being ordered: double electric breast pump  Dispense: 1 Units; Refill: 0  - Clt. Grp B Strp Screen ([facility])    2. Uterine size date discrepancy pregnancy, third trimester  Noted to be size > dates on exam today. High normal JOSE ALFREDO on last US. Growth US as above    3. Encounter for maternal care for breech presentation in booker pregnancy  See above        Weight gain adequate and above: 14.9 kg (32 lb 12.8 oz) to date, out of recommended total of 11-20 lbs (pregravid BMI >30)  - discussed healthy eating and lifestyle    There are no Patient Instructions on file for this visit.    Return to clinic in 2 weeks.    Beatriz Kaur MD  I precepted today with Jozef Guidry MD.

## 2019-05-22 NOTE — LETTER
May 31, 2019      Moo Say  [address]  [place] [address]        Dear Ed,    Your GBS test from clinic was negative - which means you likely will not need antibiotics in labor. Please call clinic if you have any questions.     Please see below for your test results.    Resulted Orders   Clt. Grp B Strp Screen ([employer])   Result Value Ref Range    Group B Strep Antigen Negative Negative    Allergic To Penicillin No     Narrative    Test performed by:  [facility]  [address] [place] [address]  Intended use:  The Front Up Xpert GBS LB Assay, performed on the [employer]  Instrument   Systems, is a qualitative in vitro diagnostic test designed to detect Group B   Streptococcus (GBS) DNA from enriched vaginal/rectal swab specimens, using   fully automated realtime polymerase chain reaction (PCR) with fluorogenic   detection of the amplified DNA. Xpert GBS LB Assay testing is indicated as an   aid in determining GBS colonization status in antepartum women. This assay   does not diagnose or monitor treatment for GBS infections.  The Front Up Xpert   GBS LB Assay is intended for use in hospital, reference or state laboratory   settings.  The device is not intended for point-of-care use.  Methodology:  The [employer] Instrument Systems automate and integrate sample lysis, nucleic   acid purification and amplification, and detection of the target sequence in   simple or complex samples using real-time polymerase chain reaction (PCR). The   GBS primers and probe detect a target within a 3' DNA region adjacent to the   cfb gene of S. agalactiae. A fluorescent signal becomes detected and increases   each time the specific DNA strand is amplified. The Real-time PCR generates a   growth curve with number of cycles on the x-axis and fluorescence on the   y-axis. If the organism s DNA is not detected by the real-time PCR reaction   the growth curve will be flat and will be resulted as negative.        If you have any questions, please call the clinic to make an appointment.    Sincerely,    Beatriz Kaur MD

## 2019-05-22 NOTE — NURSING NOTE
Due to patient being non-English speaking/uses sign language, an  was used for this visit. Only for face-to-face interpretation by an external agency, date and length of interpretation can be found on the scanned worksheet.       name: Calvin Centeno (Htoo)  Language: Nia  Agency:  [employer]  Phone number: [phone]  Type of interpretation:  Face-to-face, spoken

## 2019-05-22 NOTE — PROGRESS NOTES
Preceptor Attestation:   Patient seen, evaluated and discussed with the resident. I have verified the content of the note, which accurately reflects my assessment of the patient and the plan of care.   Supervising Physician:  Jozef Guidry MD

## 2019-05-23 DIAGNOSIS — O09.529 SUPERVISION OF HIGH-RISK PREGNANCY OF ELDERLY MULTIGRAVIDA: Primary | ICD-10-CM

## 2019-05-24 ENCOUNTER — OFFICE VISIT - HEALTHEAST (OUTPATIENT)
Dept: MATERNAL FETAL MEDICINE | Facility: HOSPITAL | Age: 39
End: 2019-05-24

## 2019-05-24 ENCOUNTER — RECORDS - HEALTHEAST (OUTPATIENT)
Dept: ADMINISTRATIVE | Facility: OTHER | Age: 39
End: 2019-05-24

## 2019-05-24 ENCOUNTER — RECORDS - HEALTHEAST (OUTPATIENT)
Dept: ULTRASOUND IMAGING | Facility: HOSPITAL | Age: 39
End: 2019-05-24

## 2019-05-24 DIAGNOSIS — O09.523 SUPERVISION OF ELDERLY MULTIGRAVIDA, THIRD TRIMESTER: ICD-10-CM

## 2019-05-24 DIAGNOSIS — O99.213 MATERNAL OBESITY SYNDROME IN THIRD TRIMESTER: ICD-10-CM

## 2019-05-24 DIAGNOSIS — O09.529 AMA (ADVANCED MATERNAL AGE) MULTIGRAVIDA 35+: ICD-10-CM

## 2019-05-24 LAB
ALLERGIC TO PENICILLIN: NO
GP B STREP AG SPEC QL LA: NEGATIVE

## 2019-05-24 NOTE — PROGRESS NOTES
5/24/19 Faxed prescription for breast pump to [facility].  Breast pump will be delivered to pt's home./NG

## 2019-06-03 ENCOUNTER — ALLIED HEALTH/NURSE VISIT (OUTPATIENT)
Dept: FAMILY MEDICINE | Facility: CLINIC | Age: 39
End: 2019-06-03
Payer: COMMERCIAL

## 2019-06-03 DIAGNOSIS — O09.529 SUPERVISION OF HIGH-RISK PREGNANCY OF ELDERLY MULTIGRAVIDA: ICD-10-CM

## 2019-06-03 DIAGNOSIS — O09.529 AMA (ADVANCED MATERNAL AGE) MULTIGRAVIDA 35+: Primary | ICD-10-CM

## 2019-06-03 NOTE — PROGRESS NOTES
Due to patient being non-English speaking/uses sign language, an  was used for this visit. Only for face-to-face interpretation by an external agency, date and length of interpretation can be found on the scanned worksheet.     name: Calvin Centeno  Agency: [employer]  Language: Nia   Telephone number: [phone]  Type of interpretation: Face-to-face, spoken

## 2019-06-05 NOTE — PROGRESS NOTES
Subjective  Concerns: No concerns today.     ROS:  YES , chronic comes and goes. Tylenol helps some.: Headache  YES, chronic - Changes in vision  No - Chest Pain  No - Shortness of Breath  YES, with headaches occasionally - Nausea   No - Vomiting  YES, some gas pain - Abdominal pain   No - Contractions  No - Dysuria   No - Vaginal Discharge    No - Vaginal bleeding   No - Loss of Fluid   YES - Extremity swelling   Absent - Fetal movement       Going to WIC? Yes    Patient Active Problem List   Diagnosis     Absolute glaucoma, right eye     Blind right eye     Lung granuloma (H)     Microphthalmia with cataract     Migraine     Supervision of high-risk pregnancy of elderly multigravida     AMA (advanced maternal age) multigravida 35+     Language barrier, cultural differences     Betel deposit on teeth     Hyperemesis gravidarum     Acute pain of right knee       Moo Say speaks Nia so an  was used today.    Guidance:  post-partum care  GBS  signs of labor  pain control during labor    Do you need help getting a car seat? No  Do you need help getting a breast pump? Prescription faxed to [facility] and breast pump to be delivered to patient home.       Objective  LMP 09/07/2018 (Within Days)   No distress.  Gravid abdomen.  FHT 140s.  Fundal height 41 cm.  1+ edema.  Cervix: not examined per patient preference    Results  Blood type: A POS  Results for orders placed or performed in visit on 05/22/19   [facility]   Result Value Ref Range    Group B Strep Antigen Negative Negative    Allergic To Penicillin No     Narrative    Test performed by:  [facility] [address] [place] [address]  Intended use:  The SongAfter Xpert GBS LB Assay, performed on the [employer]   Systems, is a qualitative in vitro diagnostic test designed to detect Group B   Streptococcus (GBS) DNA from enriched vaginal/rectal swab specimens, using   fully automated realtime polymerase  chain reaction (PCR) with fluorogenic   detection of the amplified DNA. Xpert GBS LB Assay testing is indicated as an   aid in determining GBS colonization status in antepartum women. This assay   does not diagnose or monitor treatment for GBS infections.  The CellControl Xpert   GBS LB Assay is intended for use in hospital, reference or state laboratory   settings.  The device is not intended for point-of-care use.  Methodology:  The SimplyTapp Instrument Systems automate and integrate sample lysis, nucleic   acid purification and amplification, and detection of the target sequence in   simple or complex samples using real-time polymerase chain reaction (PCR). The   GBS primers and probe detect a target within a 3' DNA region adjacent to the   cfb gene of S. agalactiae. A fluorescent signal becomes detected and increases   each time the specific DNA strand is amplified. The Real-time PCR generates a   growth curve with number of cycles on the x-axis and fluorescence on the   y-axis. If the organism s DNA is not detected by the real-time PCR reaction   the growth curve will be flat and will be resulted as negative.       Assessment & Plan  39 year old  at 38w0d with HARJIT 2019 based on 9w4d US. Pregnancy is complicated by AMA, excessive weight gain in pregnancy, depression, chronic headaches, and language barrier.      Moo was seen today for prenatal care.    Diagnoses and all orders for this visit:    Supervision of high-risk pregnancy of elderly multigravida  Excessive weight gain in pregnancy, third trimester  Patient at 38 weeks today.  Having no contractions, patient does not have 1+ feels like she is not close to labor.  Bilateral pitting edema to midcalf.  Blood pressure within normal limits. Patient does have chronic headaches and vision changes but no significant new changes.  -Weekly BPP and NST until delivery  -Delivery plan today  -Follow-up appointment on Monday with BPP and NST.    Absolute  glaucoma, right eye  Patient requested a refill for eyedrops.  -     timolol maleate (TIMOPTIC) 0.5 % ophthalmic solution; Place 1 drop into both eyes 2 times daily        Weight gain excessive: 16 kg (35 lb 3.2 oz) to date, out of recommended total of 11-20 lbs (pregravid BMI >30).  Weight gain significantly over goal.  Discussed healthy diet with patient.    Patient Instructions       Call the clinic or [facility] ([phone]) if you have any of the signs/symptoms described below:  - Loss of fluid  - Vaginal bleeding  - Decreased fetal movement (less than 10 movements in 2 hours)  - Contractions (occurring every 5 minutes and lasting 60 - 90 seconds)  - Severe headache, vision changes, increased abdominal pain, significant change in swelling in your hands our feet.         Delivery Plan   I NEED A Nia      Take me to: [facility]  Phone number: [phone]    My name is: Ed Juan Carlos  : 1980    My Doctor is: Azul Olivares   Attending Physician: [facility]  Prenatal care was at [facility] [phone].    39 year old         -para:   Estimated Date of Delivery: 2019  At 38w0d on 2019  Delivery type: Vaginal Delivery    Patient Active Problem List   Diagnosis     Absolute glaucoma, right eye     Blind right eye     Lung granuloma (H)     Microphthalmia with cataract     Migraine     Supervision of high-risk pregnancy of elderly multigravida     AMA (advanced maternal age) multigravida 35+     Language barrier, cultural differences     Betel deposit on teeth     Hyperemesis gravidarum     Acute pain of right knee     Allergies:No Known Allergies    Lab Results:  Blood Type: A POS  GBS:negative Date completed: 2019  Obtain cord gases and send placenta for pathology due to: N/A  Rubella IgG   Date/Time Value Ref Range Status   2018 10:25 AM Positive  Final     HIV Antigen/Antibody    Date/Time Value Ref Range Status   11/21/2018 10:25 AM Negative Negative Final     No results found for: N5UV  Hepatitis B Surface Antigen   Date/Time Value Ref Range Status   11/21/2018 10:25 AM Negative Negative Final     Hemoglobin   Date Value Ref Range Status   03/21/2019 11.2 (L) 11.7 - 15.7 g/dL Final   11/21/2018 13.1 11.7 - 15.7 g/dL Final         Immunization History   Administered Date(s) Administered     Flu, Unspecified 01/22/2018     HepB, Unspecified 11/09/2016, 12/20/2016, 07/06/2017     HepB-Adult 11/09/2016, 12/20/2016, 07/06/2017, 05/24/2018     Historical DTP/aP 11/09/2016, 12/20/2016     Influenza Vaccine IM 3yrs+ 4 Valent IIV4 09/13/2018     Influenza Vaccine, 3 YRS +, IM (QUADRIVALENT W/PRESERVATIVES) 01/22/2018     MMR 11/09/2016, 02/15/2017     TDAP Vaccine (Boostrix) 05/24/2018, 04/18/2019     Td (Adult), Adsorbed 11/09/2016, 12/20/2016     Tdap (Adult) Unspecified Formulation 12/20/2016         Immunizations needed postpartum: None needed     Who will be present at the delivery? Possibly sister or      Do you have a ?No    What are your plans for pain control? Undecided.     Who will cut the umbilical cord? Depends on who is able to make it to the delivery.     Do you plan to breastfeed? Yes    If your baby is a boy, would you like him circumcised?N/A    Name of baby's clinic: [facility]    Would you like your baby to have the recommended vitamin K shot to prevent bleeding, Hepatitis B shot, and eye ointment to prevent eye infections?Yes    Next Appointment is: 1 week          Return to clinic in 1 week.    Azul Olivares MD, PGY1  [facility]    Patient discussed with Dr. Michael Rankin, who agrees with the assessment and plan.

## 2019-06-06 ENCOUNTER — OFFICE VISIT (OUTPATIENT)
Dept: FAMILY MEDICINE | Facility: CLINIC | Age: 39
End: 2019-06-06
Payer: COMMERCIAL

## 2019-06-06 VITALS
RESPIRATION RATE: 24 BRPM | HEART RATE: 72 BPM | TEMPERATURE: 98.4 F | BODY MASS INDEX: 37.73 KG/M2 | WEIGHT: 220.2 LBS | DIASTOLIC BLOOD PRESSURE: 84 MMHG | OXYGEN SATURATION: 97 % | SYSTOLIC BLOOD PRESSURE: 125 MMHG

## 2019-06-06 DIAGNOSIS — O09.523 ELDERLY MULTIGRAVIDA IN THIRD TRIMESTER: ICD-10-CM

## 2019-06-06 DIAGNOSIS — Z60.3 LANGUAGE BARRIER, CULTURAL DIFFERENCES: ICD-10-CM

## 2019-06-06 DIAGNOSIS — O09.529 SUPERVISION OF HIGH-RISK PREGNANCY OF ELDERLY MULTIGRAVIDA: Primary | ICD-10-CM

## 2019-06-06 DIAGNOSIS — O26.03 EXCESSIVE WEIGHT GAIN DURING PREGNANCY IN THIRD TRIMESTER: ICD-10-CM

## 2019-06-06 DIAGNOSIS — H44.511 ABSOLUTE GLAUCOMA, RIGHT EYE: ICD-10-CM

## 2019-06-06 PROBLEM — O21.0 HYPEREMESIS GRAVIDARUM: Status: RESOLVED | Noted: 2018-11-21 | Resolved: 2019-06-06

## 2019-06-06 RX ORDER — TIMOLOL MALEATE 5 MG/ML
1 SOLUTION/ DROPS OPHTHALMIC 2 TIMES DAILY
Qty: 5 ML | Refills: 11 | Status: SHIPPED | OUTPATIENT
Start: 2019-06-06 | End: 2022-03-02

## 2019-06-06 SDOH — SOCIAL STABILITY - SOCIAL INSECURITY: ACCULTURATION DIFFICULTY: Z60.3

## 2019-06-06 NOTE — NURSING NOTE
Due to patient being non-English speaking/uses sign language, an  was used for this visit. Only for face-to-face interpretation by an external agency, date and length of interpretation can be found on the scanned worksheet.       name: Calvin Centeno (Htoo)  Language: Nia  Agency:  [employer]  Phone number: [phone]  Type of interpretation:  Face-to-face, spoken

## 2019-06-06 NOTE — PATIENT INSTRUCTIONS
Call the clinic or [facility] ([phone]) if you have any of the signs/symptoms described below:  - Loss of fluid  - Vaginal bleeding  - Decreased fetal movement (less than 10 movements in 2 hours)  - Contractions (occurring every 5 minutes and lasting 60 - 90 seconds)  - Severe headache, vision changes, increased abdominal pain, significant change in swelling in your hands our feet.         Delivery Plan   I NEED A Nia      Take me to: [facility]  Phone number: [phone]    My name is: Ed Say  : 1980    My Doctor is: Azul Olivares   Attending Physician: [facility]  Prenatal care was at [facility] [phone].    39 year old         -para:   Estimated Date of Delivery: 2019  At 38w0d on 2019  Delivery type: Vaginal Delivery    Patient Active Problem List   Diagnosis     Absolute glaucoma, right eye     Blind right eye     Lung granuloma (H)     Microphthalmia with cataract     Migraine     Supervision of high-risk pregnancy of elderly multigravida     AMA (advanced maternal age) multigravida 35+     Language barrier, cultural differences     Betel deposit on teeth     Hyperemesis gravidarum     Acute pain of right knee     Allergies:No Known Allergies    Lab Results:  Blood Type: A POS  GBS:negative Date completed: 2019  Obtain cord gases and send placenta for pathology due to: N/A  Rubella IgG   Date/Time Value Ref Range Status   2018 10:25 AM Positive  Final     HIV Antigen/Antibody   Date/Time Value Ref Range Status   2018 10:25 AM Negative Negative Final     No results found for: N5UV  Hepatitis B Surface Antigen   Date/Time Value Ref Range Status   2018 10:25 AM Negative Negative Final     Hemoglobin   Date Value Ref Range Status   2019 11.2 (L) 11.7 - 15.7 g/dL Final   2018 13.1 11.7 - 15.7 g/dL Final         Immunization History    Administered Date(s) Administered     Flu, Unspecified 01/22/2018     HepB, Unspecified 11/09/2016, 12/20/2016, 07/06/2017     HepB-Adult 11/09/2016, 12/20/2016, 07/06/2017, 05/24/2018     Historical DTP/aP 11/09/2016, 12/20/2016     Influenza Vaccine IM 3yrs+ 4 Valent IIV4 09/13/2018     Influenza Vaccine, 3 YRS +, IM (QUADRIVALENT W/PRESERVATIVES) 01/22/2018     MMR 11/09/2016, 02/15/2017     TDAP Vaccine (Boostrix) 05/24/2018, 04/18/2019     Td (Adult), Adsorbed 11/09/2016, 12/20/2016     Tdap (Adult) Unspecified Formulation 12/20/2016         Immunizations needed postpartum: None needed     Who will be present at the delivery? Possibly sister or      Do you have a ?No    What are your plans for pain control? Undecided.     Who will cut the umbilical cord? Depends on who is able to make it to the delivery.     Do you plan to breastfeed? Yes    If your baby is a boy, would you like him circumcised?N/A    Name of baby's clinic: [facility]    Would you like your baby to have the recommended vitamin K shot to prevent bleeding, Hepatitis B shot, and eye ointment to prevent eye infections?Yes    Next Appointment is: 1 week

## 2019-06-08 NOTE — PROGRESS NOTES
"Subjective  Concerns: Didn't receive eye drops from pharmacy. Is having back pain that is worse in the monring.     ROS:  YES, chronic - Headache  YES,chronic - Changes in vision  No - Chest Pain  No - Shortness of Breath  No - Nausea   No - Vomiting  No - Abdominal pain   No - Contractions  No - Dysuria   No - Vaginal Discharge    No - Vaginal bleeding   No - Loss of Fluid   No - Extremity swelling   Present - Fetal movement       Going to WIC? Yes    Patient Active Problem List   Diagnosis     Absolute glaucoma, right eye     Blind right eye     Lung granuloma (H)     Microphthalmia with cataract     Migraine     Supervision of high-risk pregnancy of elderly multigravida     AMA (advanced maternal age) multigravida 35+     Language barrier, cultural differences     Betel deposit on teeth     Acute pain of right knee       Moo Say speaks Nia so an  was used today.    Guidance:  post-partum care  GBS  signs of labor  pain control during labor    Do you need help getting a car seat? No  Do you need help getting a breast pump? No, received through [employer] [facility].        Objective  /82   Pulse 57   Temp 97.9  F (36.6  C)   Resp 18   Ht 1.619 m (5' 3.75\")   Wt 99.1 kg (218 lb 6.4 oz)   LMP 09/07/2018 (Within Days)   BMI 37.78 kg/m    No distress.  Gravid abdomen.  -135.  Fundal height 41.  1+ edema.  Cervix: Fingertip, long, -3 station    Results  Blood type: A POS  Results for orders placed or performed in visit on 05/22/19   Clt. Grp B Strp Screen ([facility])   Result Value Ref Range    Group B Strep Antigen Negative Negative    Allergic To Penicillin No     Narrative    Test performed by:  [facility] [address] [place] [address]  Intended use:  The LifeCareSim Xpert GBS LB Assay, performed on the [employer]  Instrument   Systems, is a qualitative in vitro diagnostic test designed to detect Group B   Streptococcus (GBS) DNA from enriched vaginal/rectal swab specimens, " using   fully automated realtime polymerase chain reaction (PCR) with fluorogenic   detection of the amplified DNA. Xpert GBS LB Assay testing is indicated as an   aid in determining GBS colonization status in antepartum women. This assay   does not diagnose or monitor treatment for GBS infections.  The Vir[employer] Systems Xpert   GBS LB Assay is intended for use in hospital, reference or state laboratory   settings.  The device is not intended for point-of-care use.  Methodology:  The [employer]visors Instrument Systems automate and integrate sample lysis, nucleic   acid purification and amplification, and detection of the target sequence in   simple or complex samples using real-time polymerase chain reaction (PCR). The   GBS primers and probe detect a target within a 3' DNA region adjacent to the   cfb gene of S. agalactiae. A fluorescent signal becomes detected and increases   each time the specific DNA strand is amplified. The Real-time PCR generates a   growth curve with number of cycles on the x-axis and fluorescence on the   y-axis. If the organism s DNA is not detected by the real-time PCR reaction   the growth curve will be flat and will be resulted as negative.       Assessment & Plan  39 year old  at 38w4d with HARJIT 2019 based on 9w4d US. Pregnancy complicated by AMA, excessive weight gain in pregnancy, depression, chronic headache, and language barrier.     Moo was seen today for prenatal care and medication request.    Diagnoses and all orders for this visit:    AMA (advanced maternal age) multigravida 35+, third trimester  Supervision of high-risk pregnancy of elderly multigravida  Planning to schedule induction on Thursday, 2019 at 39 weeks due to AMA >40yrs ago. Murrell unfavorable at 0-1. Plan for oral cytotec. [facility] recommended delivery between 39 and 40 weeks and patient is hoping to make a green card appointment on 19. Letter provided today asking them to reschedule her appointment. NST and  BPP today. NST reactive.   -     FETAL NON-STRESS TEST  -     Prenatal Vit-Fe Fumarate-FA (PRENATAL MULTIVITAMIN W/IRON) 27-0.8 MG tablet; Take 1 tablet by mouth daily    Date of induction: 19  HARJIT: Estimated Date of Delivery: 2019   GA: 38w4d weeks   G/P:   Level of induction: Level 4 (AMA >40)    Fetal maturity criteria: for level 4 and 5   U/S at 6-11 weeks that supports GA greater than 39 weeks.    Estimated fetal weight: <4500gms     Murrell score:   Sign Assessment 0 1 2 3 Score   Dilation 0 1-2 3-4 5-6 0   Effacement 0-30% 40-50% 60-70% 80% 0   Consistency Firm Average Soft ------- 0   Position Posterior Mid Anterior ------- 0   Station -3 -2 -1/0 +1 0       Total 0     Form of induction: Oral cytotec     Weight gain adequate and excessive: 15.2 kg (33 lb 6.4 oz) to date, out of recommended total of 11-20 lbs (pregravid BMI >30).    Return to clinic in 5 days if not delivered.    Azul Olivares MD, PGY1  [facility]    Patient discussed with Dr. Barbara Wright who agrees with the assessment and plan.

## 2019-06-10 ENCOUNTER — OFFICE VISIT (OUTPATIENT)
Dept: FAMILY MEDICINE | Facility: CLINIC | Age: 39
End: 2019-06-10
Payer: COMMERCIAL

## 2019-06-10 DIAGNOSIS — O09.529 SUPERVISION OF HIGH-RISK PREGNANCY OF ELDERLY MULTIGRAVIDA: ICD-10-CM

## 2019-06-10 DIAGNOSIS — O26.843 UTERINE SIZE DATE DISCREPANCY PREGNANCY, THIRD TRIMESTER: ICD-10-CM

## 2019-06-10 DIAGNOSIS — O09.523 AMA (ADVANCED MATERNAL AGE) MULTIGRAVIDA 35+, THIRD TRIMESTER: Primary | ICD-10-CM

## 2019-06-10 RX ORDER — PRENATAL VIT/IRON FUM/FOLIC AC 27MG-0.8MG
1 TABLET ORAL DAILY
Qty: 100 TABLET | Refills: 3 | Status: SHIPPED | OUTPATIENT
Start: 2019-06-10 | End: 2020-05-06

## 2019-06-10 ASSESSMENT — MIFFLIN-ST. JEOR: SCORE: 1636.69

## 2019-06-10 NOTE — LETTER
Olivia 10, 2019      Ed Say  [address]  [place] [address]        To whom it may concern.    Ed Say is currently pregnant with a due date of 6/20/2019. She has an appointment for her Green Card scheduled on 6/18/2019. Please allow the patient to reschedule this appointment as she may be in the hospital during the time of the currently scheduled appointment. Please call the clinic with any additional questions or concerns.     Sincerely,    Azul Olivares MD

## 2019-06-10 NOTE — NURSING NOTE
Due to patient being non-English speaking/uses sign language, an  was used for this visit. Only for face-to-face interpretation by an external agency, date and length of interpretation can be found on the scanned worksheet.     name: Geovanni Cox  Agency: [employer]  Language: Nia   Telephone number: [phone]  Type of interpretation: Face-to-face, spoken

## 2019-06-10 NOTE — Clinical Note
Induction scheduled for 6/13/19 at 0800. If possible, patient should call 2 hours prior to induction to make sure they have availability. intermediate knows that she is non-English speaking and they will try to call early if they know she is unable to be induced that morning.

## 2019-06-10 NOTE — PATIENT INSTRUCTIONS
[facility]  [address] [place] [address]    Phone: [phone]    APPT: Thursday, 19 at 8:00 AM for induction of labor  RIDE: [employer]- Nia speaking  will pick you plus family member between 6:45-7:00 AM  Trip # [id number]      Delivery Plan     I NEED A Nia      Take me to: [facility]  Phone number: [phone]    My name is: Ed Say  : 1980    My Doctor is: Azul Olivares   Attending Physician: [facility]  [facility] [phone].    39 year old         -para:   Estimated Date of Delivery: 2019  At 38w4d on Olivia 10, 2019  Delivery type: Vaginal Delivery    Patient Active Problem List   Diagnosis     Absolute glaucoma, right eye     Blind right eye     Lung granuloma (H)     Microphthalmia with cataract     Migraine     Supervision of high-risk pregnancy of elderly multigravida     AMA (advanced maternal age) multigravida 35+     Language barrier, cultural differences     Betel deposit on teeth     Acute pain of right knee     Allergies:No Known Allergies    Lab Results:  Blood Type: A POS  GBS:negative Date completed: 2019  Obtain cord gases and send placenta for pathology due to: N/A  Rubella IgG   Date/Time Value Ref Range Status   2018 10:25 AM Positive  Final     HIV Antigen/Antibody   Date/Time Value Ref Range Status   2018 10:25 AM Negative Negative Final     No results found for: N5UV  Hepatitis B Surface Antigen   Date/Time Value Ref Range Status   2018 10:25 AM Negative Negative Final     Hemoglobin   Date Value Ref Range Status   2019 11.2 (L) 11.7 - 15.7 g/dL Final   2018 13.1 11.7 - 15.7 g/dL Final       Last cervical check: Olivia 10, 2019 Cervical Dilation: Fingertip, Effacement:  Long    Immunization History   Administered Date(s) Administered     Flu, Unspecified 2018     HepB, Unspecified 2016,  2016, 2017     HepB-Adult 2016, 2016, 2017, 2018     Historical DTP/aP 2016, 2016     Influenza Vaccine IM 3yrs+ 4 Valent IIV4 2018     Influenza Vaccine, 3 YRS +, IM (QUADRIVALENT W/PRESERVATIVES) 2018     MMR 2016, 02/15/2017     TDAP Vaccine (Boostrix) 2018, 2019     Td (Adult), Adsorbed 2016, 2016     Tdap (Adult) Unspecified Formulation 2016         Immunizations needed postpartum: None needed    Who will be present at the delivery?Undecided,  or sister    Do you have a ?No    What are your plans for pain control? Undecided. Natural for other deliveries    Who will cut the umbilical cord?Depends on who is present at delivery    Do you plan to breastfeed?Yes    If your baby is a boy, would you like him circumcised?N/A    Name of baby's clinic: [facility]    Would you like your baby to have the recommended vitamin K shot to prevent bleeding, Hepatitis B shot, and eye ointment to prevent eye infections?Yes    Date of induction: 19  Time: 0800  HARJIT: Estimated Date of Delivery: 2019   GA: 38w4d weeks   G/P:     Call [facility] at 0800 ([phone]) to ensure that they are able to start your induction on time.    Go to [facility].    [facility]    As we discussed, induction is a method of treatment to try and get you to go into labor.  There are 2 different forms of induction that we use.  The first is called cervidil or cytotec in which we will give you by mouth or place by your cervix to get it ready for labor.  Sometimes this will also start labor.  The second method is pitocin, which is a medicine that you get through an IV that causes your body to have contractions.      When you go to the hospital you will be receiving: Cytotec

## 2019-06-10 NOTE — PROGRESS NOTES
"Preceptor attestation:  Vital signs reviewed: /82   Pulse 57   Temp 97.9  F (36.6  C)   Resp 18   Ht 1.619 m (5' 3.75\")   Wt 99.1 kg (218 lb 6.4 oz)   LMP 09/07/2018 (Within Days)   BMI 37.78 kg/m      Patient seen, evaluated, and discussed with the resident.  I have verified the content of the note, which accurately reflects my assessment of the patient and the plan of care.    I reviewed the NST, and it is reactive.    Supervising physician: Barbara Wright MD  [facility]  "

## 2019-06-11 NOTE — NURSING NOTE
610/19 1200 PM BPP 6/8 with zero breathing movements seen.  Discussed with Dr Willams and pt should do kick counts.    Gave info to pt with November interpreting.  Discussed the importance of going to [facility] right away if decreased fetal movement, regular contractions, vaginal bleeding, or fluid leaking.  Pt verbalized understanding.  Told pt that I will call her with induction and ride info for Thursday./NG

## 2019-06-19 ENCOUNTER — ANESTHESIA - HEALTHEAST (OUTPATIENT)
Dept: OBGYN | Facility: CLINIC | Age: 39
End: 2019-06-19

## 2019-06-19 ENCOUNTER — TELEPHONE (OUTPATIENT)
Dept: FAMILY MEDICINE | Facility: CLINIC | Age: 39
End: 2019-06-19

## 2019-06-20 VITALS
HEART RATE: 57 BPM | WEIGHT: 218.4 LBS | RESPIRATION RATE: 18 BRPM | DIASTOLIC BLOOD PRESSURE: 82 MMHG | TEMPERATURE: 97.9 F | HEIGHT: 64 IN | SYSTOLIC BLOOD PRESSURE: 122 MMHG | BODY MASS INDEX: 37.28 KG/M2

## 2019-06-20 PROBLEM — O09.529 AMA (ADVANCED MATERNAL AGE) MULTIGRAVIDA 35+: Status: RESOLVED | Noted: 2018-11-21 | Resolved: 2019-06-20

## 2019-06-20 PROBLEM — O09.529 SUPERVISION OF HIGH-RISK PREGNANCY OF ELDERLY MULTIGRAVIDA: Status: RESOLVED | Noted: 2018-11-21 | Resolved: 2019-06-20

## 2019-06-21 ENCOUNTER — HOME CARE/HOSPICE - HEALTHEAST (OUTPATIENT)
Dept: HOME HEALTH SERVICES | Facility: HOME HEALTH | Age: 39
End: 2019-06-21

## 2019-06-23 ENCOUNTER — HOME CARE/HOSPICE - HEALTHEAST (OUTPATIENT)
Dept: HOME HEALTH SERVICES | Facility: HOME HEALTH | Age: 39
End: 2019-06-23

## 2019-07-01 ENCOUNTER — TELEPHONE (OUTPATIENT)
Dept: FAMILY MEDICINE | Facility: CLINIC | Age: 39
End: 2019-07-01

## 2019-07-01 NOTE — TELEPHONE ENCOUNTER
[place] Appointment: July 2, 2019 10:20 am   Address: [address] [place] [address]   First floor  Dr. Gregorio    RIDE: [employer] Nia speaking  requested.   time between 9:10-9:35 am and set up return ride for 11:20 AM.    Gave info to Calvin Centeno (DESIREE) and he will let pt know./NG

## 2019-07-01 NOTE — PATIENT INSTRUCTIONS
[place] Appointment: July 2, 2019 10:20 am   Address: [address] [place] [address]   First floor  Dr. Gregorio    RIDE: [employer]- Nia speaking  requested.   time between 9:10-9:35 am and set up return ride for 11:20 AM.

## 2019-07-02 ENCOUNTER — TELEPHONE (OUTPATIENT)
Dept: FAMILY MEDICINE | Facility: CLINIC | Age: 39
End: 2019-07-02

## 2019-07-02 NOTE — TELEPHONE ENCOUNTER
Pt called Calvin Centeno (I) to let him know that she missed her appt with lactation consultant today due to HA.  She states that baby is now breast feeding well and she no longer wants/needs to see lactation consultant./NG

## 2019-07-02 NOTE — TELEPHONE ENCOUNTER
"Pt called Calvin Centeno (I) to let him know that she missed her appt with lactation consultant today due to HA.  She states that baby is now breast feeding well and she no longer wants/needs to see lactation consultant.    Pt delivered her baby on 6/19/19 and had transient htn in labor and early postpartum that did not require treatment.  She states that she has had a bad HA today and has taken Tylenol 1,000 mg every 6 hours with little relief.  She denies swelling and epigastric pain.  She has a little increase in blurry vision (has rt side glaucoma and near blindness in rt eye).  She states that there is a shooting pain behind both eyes.  Unclear if has had HA's like this before.  Asked pt if she has been drinking water and she states that she drinks \"lots\".  Told her that she can try lying down in a quiet dark room with a cool wash cloth over her eyes (may be difficult as she has several children).  Told pt that if her HA or blurry vision worsens or if she develops swelling or rt sided epigastric pain that she needs to go to the ER.  Appt made for nia, 7/3/19 at 0800 with Dr Coreas.  Pt verbalized understanding.  Routed note to Dr Coreas./JANET  "

## 2019-07-03 ENCOUNTER — OFFICE VISIT (OUTPATIENT)
Dept: FAMILY MEDICINE | Facility: CLINIC | Age: 39
End: 2019-07-03
Payer: COMMERCIAL

## 2019-07-03 VITALS
WEIGHT: 193 LBS | OXYGEN SATURATION: 97 % | SYSTOLIC BLOOD PRESSURE: 114 MMHG | DIASTOLIC BLOOD PRESSURE: 79 MMHG | RESPIRATION RATE: 14 BRPM | TEMPERATURE: 97.9 F | BODY MASS INDEX: 33.39 KG/M2 | HEART RATE: 61 BPM

## 2019-07-03 DIAGNOSIS — R51.9 NONINTRACTABLE EPISODIC HEADACHE, UNSPECIFIED HEADACHE TYPE: Primary | ICD-10-CM

## 2019-07-03 DIAGNOSIS — E86.0 DEHYDRATION: ICD-10-CM

## 2019-07-03 DIAGNOSIS — Z91.89 AT RISK FOR POSTPARTUM DEPRESSION: ICD-10-CM

## 2019-07-03 LAB
ALBUMIN SERPL-MCNC: 4.3 MG/DL (ref 3.9–5.1)
ALP SERPL-CCNC: 104.7 U/L (ref 40–150)
ALT SERPL-CCNC: 77.8 U/L (ref 0–45)
AST SERPL-CCNC: 55.6 U/L (ref 0–45)
BILIRUB SERPL-MCNC: 0.4 MG/DL (ref 0.2–1.3)
BUN SERPL-MCNC: 4.8 MG/DL (ref 7–19)
CALCIUM SERPL-MCNC: 9.4 MG/DL (ref 8.5–10.1)
CHLORIDE SERPLBLD-SCNC: 106.4 MMOL/L (ref 98–110)
CO2 SERPL-SCNC: 26.5 MMOL/L (ref 20–32)
CREAT SERPL-MCNC: 0.6 MG/DL (ref 0.5–1)
CREAT UR-MCNC: 263.6 MG/DL
ERYTHROCYTE [DISTWIDTH] IN BLOOD BY AUTOMATED COUNT: 12.9 % (ref 11–14.5)
GFR SERPL CREATININE-BSD FRML MDRD: >90 ML/MIN/1.7 M2
GLUCOSE SERPL-MCNC: 88.5 MG'DL (ref 70–99)
HCT VFR BLD AUTO: 38.7 % (ref 35–47)
HGB BLD-MCNC: 12.5 G/DL (ref 12–16)
MCH RBC QN AUTO: 31 PG (ref 27–34)
MCHC RBC AUTO-ENTMCNC: 32.3 G/DL (ref 32–36)
MCV RBC AUTO: 96 FL (ref 80–100)
PLATELET # BLD AUTO: 297 THOU/UL (ref 140–440)
PMV BLD AUTO: 10.2 FL (ref 8.5–12.5)
POTASSIUM SERPL-SCNC: 3.9 MMOL/DL (ref 3.2–4.6)
PROT SERPL-MCNC: 7.9 G/DL (ref 6.8–8.8)
PROT UR-MCNC: 36 MG/DL
PROTEIN/CREAT RATIO, RANDOM UR: 0.14
RBC # BLD AUTO: 4.03 MILL/UL (ref 3.8–5.4)
SODIUM SERPL-SCNC: 140.6 MMOL/L (ref 132–142)
URATE SERPL-MCNC: 7.7 MG/DL (ref 2–7.5)
WBC # BLD AUTO: 6 THOU/UL (ref 4–11)

## 2019-07-03 RX ORDER — IBUPROFEN 600 MG/1
600 TABLET, FILM COATED ORAL EVERY 6 HOURS PRN
Qty: 90 TABLET | Refills: 0 | Status: SHIPPED | OUTPATIENT
Start: 2019-07-03 | End: 2019-08-22

## 2019-07-03 ASSESSMENT — PATIENT HEALTH QUESTIONNAIRE - PHQ9: SUM OF ALL RESPONSES TO PHQ QUESTIONS 1-9: 13

## 2019-07-03 NOTE — NURSING NOTE
Due to patient being non-English speaking/uses sign language, an  was used for this visit. Only for face-to-face interpretation by an external agency, date and length of interpretation can be found on the scanned worksheet.       name: Calvin Centeno (Htoo)  Language: Nia  Agency:  [employer]  Phone number: [phone]  Type of interpretation:  Face-to-face, spoken

## 2019-07-03 NOTE — PROGRESS NOTES
S: Ed Rangel is a 39 year old female with a PMH of   Patient Active Problem List   Diagnosis     Absolute glaucoma, right eye     Blind right eye     Lung granuloma (H)     Microphthalmia with cataract     Migraine     Language barrier, cultural differences     Betel deposit on teeth     Acute pain of right knee     presenting to clinic today with a chief complaint of headaches for 1.5 weeks. Birth of child was 6/19/2019 and had a baby girl. Planned pregnancy. She has 5 children currently. She has tried tylenol but this has not been helpful. She reports feeling like she wants to cry since baby was born. She has had this in the past but this has been everyday since birth. Denies SI/HI. Feels safe with baby. She reports help at home with family and friends.     Headache worse with feelings of wanting to cry. No known high blood pressures at home.  Reported blurry vision prior to having baby. Reports this is after pregnancy and with headaches. She reports spots in her vision after delivery. Patient has history of migraine headaches.     Per review:   High Risk Pregnancy: advanced maternal age, excessive weight gain, language barrier  Pre-eclampsia: w/o pre-existing HTN  Intrapartum Events: mild pre-eclampsia  Abnormal Labs: elevated urine protein to creatinine ratio  Blood pressures improved after delivery, and repeat HELLP labs were within normal limits. Patient did not require antihypertensives or magnesium      ROS:  General: No fevers. Occasional chills.   Head: headache.   Eyes: reported blurry vision prior to having baby.   Ears: No tinnitus     Extremities: denies swelling  Resp: No shortness of breath.  GI: No nausea.     Current Outpatient Medications   Medication Sig Dispense Refill     acetaminophen (TYLENOL) 500 MG tablet Take 1-2 tablets (500-1,000 mg) by mouth every 6 hours as needed for mild pain 90 tablet 3     ibuprofen (ADVIL/MOTRIN) 600 MG tablet Take 1 tablet (600 mg) by mouth every 6 hours as needed  for moderate pain 90 tablet 0     order for DME Equipment being ordered: double electric breast pump (Patient not taking: Reported on 6/10/2019) 1 Units 0     Prenatal Vit-Fe Fumarate-FA (PRENATAL MULTIVITAMIN W/IRON) 27-0.8 MG tablet Take 1 tablet by mouth daily (Patient not taking: Reported on 7/3/2019) 100 tablet 3     timolol maleate (TIMOPTIC) 0.5 % ophthalmic solution Place 1 drop into both eyes 2 times daily (Patient not taking: Reported on 6/10/2019) 5 mL 11       O: /79   Pulse 61   Temp 97.9  F (36.6  C) (Oral)   Resp 14   Wt 87.5 kg (193 lb)   SpO2 97%   BMI 33.39 kg/m     Gen:  Well nourished and in No acute distress   HEENT:right eye with cataract/glaucoma, dry oral mucosa and cracked and chapped lips.   Neck: supple without lymphadenopathy  CV:  RRR  - no murmurs noted   Pulm:  CTAB, no wheezes or crackles noted, good air entry   ABD: soft, nontender, no masses, no rebound, BS intact throughout, no hepatosplenomegaly  Extrem: no swelling or edema  Neuro: reflexes +2 and symmetric  Psych: flat affect     Assessment and Plan:  Moo was seen today for headache.    Diagnoses and all orders for this visit:    Nonintractable episodic headache, unspecified headache type  Dehydration  -     ibuprofen (ADVIL/MOTRIN) 600 MG tablet; Take 1 tablet (600 mg) by mouth every 6 hours as needed for moderate pain  -     CBC w/ Plt. ([facility])  -     Comprehensive Metabolic Panel ([place])  -     Prot/Creat Random Urine ([facility])  -     Uric Acid ([facility])  Due to patient's significant history of elevated blood pressures and mild preeclampsia diagnosed during delivery of infant, as well as elevated protein creatinine ratio which did normalize prior to discharge, recommend repeat lab work today.  Plan for patient to return to clinic in 2 days for blood pressure recheck.  Patient does have significant history of migraine headaches and is only been using Tylenol and also has a component of dehydration.   Discussed with patient at least 100 ounces of water per day with continued attempts at breast-feeding as this will dehydrate her more.    At risk for postpartum depression  Patient with elevated PHQ 9 screen today.  Patient is exactly 2 weeks outside of delivery and therefore that this could be component of early postpartum depression versus postpartum blues which can last up to 2 weeks.  Therefore plan to recheck PHQ 9 at follow-up visit as this may be compounding underlying stressors, headaches and poor care for herself leading to dehydration.       This patient was seen and discussed with Dr. Cee who agrees with the assessment and plan.     Stacey Coreas, DO  PGY3

## 2019-07-03 NOTE — PATIENT INSTRUCTIONS
7/3/2019  Moo Say    It was a pleasure to see you today at [facility].     My recommendations after this visit include:   1. Recheck Friday  2. Labs today  3. Ibuprofen for headaches  4. 100 oz of water per day    Thank you for allowing me to be a part of your health care team!    Sincerely,   Dr. Coreas

## 2019-07-08 NOTE — RESULT ENCOUNTER NOTE
Please call this patient regarding labs. I would like her to recheck blood work this week in the next 1-2 days as her liver enzymes were elevated and so was her uric acid. This should continue to improve with time and by 6 weeks post partum. Please make appointment with her PCP. Thank you!  Stacey Coreas, DO  PGY3

## 2019-07-24 NOTE — RESULT ENCOUNTER NOTE
Could we recheck to this patient regarding plan to return to clinic for recheck of labs?  2 attempts have been made already.  Patient to return to recheck for resolution of elevated LFTs and uric acid with history of pregnancy and elevated pressures.  Thank you,   Stacey Coreas, DO  PGY3

## 2019-08-01 ENCOUNTER — OFFICE VISIT (OUTPATIENT)
Dept: FAMILY MEDICINE | Facility: CLINIC | Age: 39
End: 2019-08-01
Payer: COMMERCIAL

## 2019-08-01 VITALS
RESPIRATION RATE: 20 BRPM | WEIGHT: 196 LBS | OXYGEN SATURATION: 98 % | HEART RATE: 58 BPM | HEIGHT: 64 IN | DIASTOLIC BLOOD PRESSURE: 80 MMHG | TEMPERATURE: 97.8 F | SYSTOLIC BLOOD PRESSURE: 119 MMHG | BODY MASS INDEX: 33.46 KG/M2

## 2019-08-01 DIAGNOSIS — R89.9 ABNORMAL LABORATORY TEST RESULT: Primary | ICD-10-CM

## 2019-08-01 LAB
ALBUMIN SERPL-MCNC: 4.5 MG/DL (ref 3.9–5.1)
ALP SERPL-CCNC: 75.7 U/L (ref 40–150)
ALT SERPL-CCNC: 58.4 U/L (ref 0–45)
AST SERPL-CCNC: 42.1 U/L (ref 0–45)
BILIRUB SERPL-MCNC: 0.4 MG/DL (ref 0.2–1.3)
BILIRUBIN DIRECT: 0.1 MG/DL (ref 0–0.2)
PROT SERPL-MCNC: 8.1 G/DL (ref 6.8–8.8)

## 2019-08-01 ASSESSMENT — PAIN SCALES - GENERAL: PAINLEVEL: NO PAIN (0)

## 2019-08-01 ASSESSMENT — MIFFLIN-ST. JEOR: SCORE: 1535.56

## 2019-08-01 ASSESSMENT — PATIENT HEALTH QUESTIONNAIRE - PHQ9: SUM OF ALL RESPONSES TO PHQ QUESTIONS 1-9: 8

## 2019-08-01 NOTE — LETTER
August 2, 2019      Moo Say  [address] [address]  [place] [address]        Dear Ed,    Please see below for your test results.    The liver tests are nearly back to normal.  Please come to the clinic in a month to recheck these again.     Resulted Orders   HEPATIC PANEL ([facility]Q)   Result Value Ref Range    Albumin 4.5 3.9 - 5.1 mg/dL    Alkaline Phosphatase 75.7 40.0 - 150.0 U/L    ALT 58.4 (H) 0.0 - 45.0 U/L    AST 42.1 0.0 - 45.0 U/L    Bilirubin Direct 0.1 0.0 - 0.2 mg/dL    Bilirubin Total 0.4 0.2 - 1.3 mg/dL    Protein Total 8.1 6.8 - 8.8 g/dL       If you have any questions, please call the clinic to make an appointment.    Sincerely,    Michael Rankin MD

## 2019-08-01 NOTE — NURSING NOTE
Chief Complaint   Patient presents with     RECHECK     F/U on LAB RESULTS     Donnell Chappell CMA    Due to patient being non-English speaking/uses sign language, an  was used for this visit. Only for face-to-face interpretation by an external agency, date and length of interpretation can be found on the scanned worksheet.     name: ZIGGY ZARATE  Agency: [employer]  Language: Nia   Telephone number: [phone]  Type of interpretation: Face-to-face, spoken     Donnell Chappell CMA

## 2019-08-01 NOTE — PROGRESS NOTES
"  Nursing Notes:   Donnell Chappell CMA  8/1/2019  9:06 AM  Signed  Chief Complaint   Patient presents with     RECHECK     F/U on LAB RESULTS     Donnell Chappell CMA    Due to patient being non-English speaking/uses sign language, an  was used for this visit. Only for face-to-face interpretation by an external agency, date and length of interpretation can be found on the scanned worksheet.     name: ZIGGY ZARATE  Agency: [employer]  Language: Nia   Telephone number: [phone]  Type of interpretation: Face-to-face, spoken     Donnell Chappell CMA          SUBJECTIVE  Moo Say is a 39 year old female with past medical history significant for glaucoma, lung granuloma, migraine, recent preeclampsia (06/2019) presents with headache and HTN follow up. Endorsing daily headache. Describes it as lasting all day and all night. Pain localized to right temporal lobe or diffuse spread intermittently. Minimal relief by acetaminophen/ibuprofen and drinking water. Possible dehydration noted on previous visit 7//2019. Drinks about 3 to 4 cups of water a day. Hisotry of lifelong migraine with new onset duration. Past headaches relieved by herbal medicines. Associated symptoms nausea, vomiting, blurry vision, dizzyness, and some lacrimation from right eye when severe. Expressing symptoms of low mood and anxiety. Endorses feeling sad, no sleep issues, anhedonia, loss of appetite, increased pain, fatigue, low activity, but gets along good with family and friends. Endorses worry about leg pain behind the knees and some back pain but describes it as coming and going. When asked the most important issue today, she responded, \"headache and feeling sad.\" Patient denies chest pain, syncope, vomiting, diarrhea, change in bladder function, recent illness, fever, or chills.     Patient Active Problem List   Diagnosis     Absolute glaucoma, right eye     Blind right eye     Lung granuloma (H)     " "Microphthalmia with cataract     Migraine     Language barrier, cultural differences     Betel deposit on teeth     Acute pain of right knee     Allergies, medications and problem list reviewed and updated as needed in Epic.      ROS  Review Of Systems  Skin: negative for rash, lumps or bumps  Eyes: positive for visual blurring, eye pain, photophobia   Ears/Nose/Throat: negative for postnasal drainage, hearing loss  Respiratory: No shortness of breath, dyspnea on exertion, cough, or hemoptysis  Cardiovascular: negative for palpitations and tachycardia  Gastrointestinal: negative for vomiting, reflux and abdominal pain  Genitourinary: negative for dysuria, frequency and urgency  Musculoskeletal: negative for back pain, neck pain and joint pain  Neurologic: positive for migraine headaches (remote history), head pain  Psychiatric: positive for anxiety and depression    Others present at the visit:       Presents for   Chief Complaint   Patient presents with     RECHECK     F/U on LAB RESULTS         OBJECTIVE:  Vitals: /80   Pulse 58   Temp 97.8  F (36.6  C) (Oral)   Resp 20   Ht 1.62 m (5' 3.78\")   Wt 88.9 kg (196 lb)   SpO2 98%   Breastfeeding? No   BMI 33.88 kg/m    BMI= Body mass index is 33.88 kg/m .    Physical Exam  Vitals:  Vitals are reviewed and are within the normal range  Gen:  Alert, pleasant, no acute distress  Cardiac:  RRR, no murmurs, rubs or gallops  Respiratory:  Lungs clear to auscultation bilaterally  Abdomen:  Soft, non-tender, non-distended, bowel sounds positive  Neurological: CN II-XII grossly intact, no FND, sensation intact, strength 5/5 bilaterally  Extremities:  Warm, well-perfused, pulses 2+/4, no lower extremity edema     ASSESSMENT AND PLAN:    #Headeache, unspecified  #Postpartum Depression  Unilateral temporal region HA intermittently spreads diffusely. Patient describes duration as \"all day\". Previous history of migraine treated with herbal medicines. No " relief from acetaminophen/ibuprofen. Associated symptoms include fatigue, nausea, blurry vision, photophobia, phonophobia, and pain. Patient teary eyed when asked about how she is doing since baby was born. Describes worsening of symptoms since birth of child. Most likely migraine compounded by postpartum depression with recent history of birth and consistent symptoms. PHQ9 score was 13 at time of visit. Recommend treatment of postpartum symptoms before pursuing other migraine etiologies.   Plan:   - Will start Zoloft 50 mg daily  - Denied referral to support group  - Recommend maintaining hydration  - Follow up in 2 weeks    #Elevated Uric Acid  #Elevated Transaminases  Previous history of preeclampsia (06/2019) with elevated uric acid and elevated transaminases. No previous history of liver dysfunction, viral hepatitides, or gout, and is asymptomatic. Lab abnormalities from 7/3/2019 thought to be secondary to preeclampsia sequelae experienced less than 3 weeks after birth. Will continue to monitor and obtain labs today to gauge possible residual effect. If liver tests remain elevated will begin further work up.  Plan:  -Repeat LFTs and uric acid today  -Follow up in 2 weeks           Moo was seen today for recheck.    Diagnoses and all orders for this visit:    Abnormal laboratory test result  -     HEPATIC PANEL (LAB[facility]Q)  -      : Sign Language or Oral - 53-67 minutes    Postpartum depression  -     sertraline (ZOLOFT) 50 MG tablet; Take 1 tablet (50 mg) by mouth daily        Patient Instructions   - Start Sertraline medication once daily  - Repeat Liver function tests 8/1/2019  - Follow up in 2 weeks      Follow up in 2 weeks for headache and postpartum depressive symptoms.    David Moreno MS4    Preceptor Attestation:  I was present with the medical student who participated in the service and in the documentation of this note. I have verified the history and personally performed the physical exam and  medical decision making. I have verified the content of the note, which accurately reflects my assessment of the patient and the plan of care.   Supervising Physician:  Michael Rankin MD       Total of 48 minutes was spent in face to face contact with patient with > 50% in chart review, counseling and coordination of care.  Options for treatment and/or follow-up care were reviewed with the patient. Ed Rangel was engaged and actively involved in the decision making process. She verbalized understanding of the options discussed and was satisfied with the final plan.

## 2019-08-01 NOTE — PATIENT INSTRUCTIONS
- Start Sertraline medication once daily  - Repeat Liver function tests 8/1/2019  - Follow up in 2 weeks

## 2019-08-02 NOTE — RESULT ENCOUNTER NOTE
The liver tests are nearly back to normal.  Please come to the clinic in a month to recheck these again.

## 2019-08-22 ENCOUNTER — OFFICE VISIT (OUTPATIENT)
Dept: FAMILY MEDICINE | Facility: CLINIC | Age: 39
End: 2019-08-22
Payer: COMMERCIAL

## 2019-08-22 VITALS
OXYGEN SATURATION: 96 % | TEMPERATURE: 98.2 F | WEIGHT: 196.6 LBS | BODY MASS INDEX: 33.98 KG/M2 | RESPIRATION RATE: 16 BRPM | SYSTOLIC BLOOD PRESSURE: 125 MMHG | HEART RATE: 55 BPM | DIASTOLIC BLOOD PRESSURE: 81 MMHG

## 2019-08-22 DIAGNOSIS — R42 DIZZINESS: ICD-10-CM

## 2019-08-22 DIAGNOSIS — S83.004A DISLOCATION OF RIGHT PATELLA, INITIAL ENCOUNTER: Primary | ICD-10-CM

## 2019-08-22 RX ORDER — IBUPROFEN 600 MG/1
600 TABLET, FILM COATED ORAL EVERY 6 HOURS PRN
Qty: 90 TABLET | Refills: 0 | Status: SHIPPED | OUTPATIENT
Start: 2019-08-22 | End: 2019-11-12

## 2019-08-22 RX ORDER — ACETAMINOPHEN 500 MG
500-1000 TABLET ORAL EVERY 6 HOURS PRN
Qty: 90 TABLET | Refills: 3 | Status: SHIPPED | OUTPATIENT
Start: 2019-08-22 | End: 2019-11-12

## 2019-08-22 NOTE — PROGRESS NOTES
SUBJECTIVE       Moo Say is a 39 year old  female with a PMH significant for:     Patient Active Problem List   Diagnosis     Absolute glaucoma, right eye     Blind right eye     Lung granuloma (H)     Microphthalmia with cataract     Migraine     Language barrier, cultural differences     Betel deposit on teeth     Acute pain of right knee     She presents with for evaluation of her knee.    Patient was walking outside last Friday.  She said she felt lightheaded and then her right knee gave way.  She fell forward striking her right knee on the ground. Nothing else touched the ground. Patient got an abrasion on the front of her knee. Patient states her knee began to swell that day. It has been so painful she has been unable to walk and come to the clinic. She has been keeping it elevated and using heat and ice. She has been using tylenol and ibuprofen 600mg for pain. Since the injury the swelling and pain has improved but still remains.     Patient noted that she felt dizzy prior to the fall. She notes that she feels this way frequently. She describes it as both room spinning and light headed. She states it often happens when she stands up and is worse in the morning. This happens daily but not every time she stands.  Patient has not been seen for this in the past.     PMH, Medications and Allergies were reviewed and updated as needed.        REVIEW OF SYSTEMS     CONSTITUTIONAL: NEGATIVE for fever, chills, change in weight  INTEGUMENTARY/SKIN: Pos for abrasion on right knee  EYES: NEGATIVE for vision changes or irritation  ENT/MOUTH: NEGATIVE for ear, mouth and throat problems  RESP: NEGATIVE for significant cough or SOB  CV: NEGATIVE for chest pain, palpitations or peripheral edema  GI: NEGATIVE for nausea, abdominal pain, heartburn, or change in bowel habits  : NEGATIVE for frequency, dysuria, or hematuria  MUSCULOSKELETAL: POS for right knee pain  NEURO: POS for weakness and dizziness.        OBJECTIVE      Vitals:    08/22/19 1106   BP: 125/81   BP Location: Left arm   Patient Position: Sitting   Pulse: 55   Resp: 16   Temp: 98.2  F (36.8  C)   TempSrc: Oral   SpO2: 96%   Weight: 89.2 kg (196 lb 9.6 oz)     Body mass index is 33.98 kg/m .    Constitutional: Awake, alert, cooperative, no apparent distress, and appears stated age.  Lungs: No increased work of breathing, good air exchange, clear to auscultation bilaterally, no crackles or wheezing.  Cardiovascular: Regular rate and rhythm, normal S1 and S2, no S3 or S4, and no murmur noted.  Abdomen: No scars, normal bowel sounds, soft, non-distended, non-tender, no masses palpated, no hepatosplenomegally.  Musculoskeletal: Abrasion located over right knee. No busing noted. Fluid wave positive. Tender to palpation over medial collateral ligament. No pain to palpation of joint space. Limited flextion due to tightness. Negative Lachman and posterior draw. Varus and valgus stresses normal. Positive patellar apprehension. Patient unable to do flex quadricept to lift straight leg off the table. Unable to do tori due to pain. Negative left knee.  Neurologic: Awake, alert, oriented to name, place and time.  Cranial nerves II-XII are grossly intact and gait is normal.    No results found for this or any previous visit (from the past 24 hour(s)).      ASSESSMENT AND PLAN     Moo was seen today for fall and pain.    Diagnoses and all orders for this visit:    Dislocation of right patella, initial encounter: history and physical  most consistent with a patellar dislocation.  Will refer patient to [facility] at [facility] and refill pain medications. Patient will likely need imaging of the joint and PT.  -     ORTHOPEDICS ADULT REFERRAL; Future  -      : Sign Language or Oral - 38-52 minutes  -     ibuprofen (ADVIL/MOTRIN) 600 MG tablet; Take 1 tablet (600 mg) by mouth every 6 hours as needed for moderate pain  -     acetaminophen (TYLENOL) 500 MG tablet;  Take 1-2 tablets (500-1,000 mg) by mouth every 6 hours as needed for mild pain    Dizziness: patient mentioned that she has been feeling dizzy lately. I strongly encouraged patient to follow up for evaluation of her dizziness.     RTC for evaluation of dizzyness or sooner if develops new or worsening symptoms.  I discussed the patient with  who is in agreement with the assessment and plan.   Jenny Sharpe MD

## 2019-08-22 NOTE — PATIENT INSTRUCTIONS
ORTHOPEDICS ADULT REFERRAL  [facility]  Phone: [phone]  Fax: [phone]    [facility])  [address] [place] [address]    Appointment:  Tuesday August 27th  Arrival Time:  8:30am     Please bring in a copy of your insurance card and photo ID.    If you cannot make this appointment, please contact [facility] at [phone] to reschedule.    [facility] [employer] will pick you up between 7:30-7:45am  Confirmation code: [id number]     Demographics, referral, office note(s) and radiology reports faxed to [phone].     [facility]  [address] [place] [address]    Phone: [phone]    APPT: Wednesday, 9/4/19 at 8:45 AM Physical therapy (60 min appt)  [facility] [employer] will  between 7:45 AM-8:00 AM  Trip # [id number]    APPT: Wednesday, 9/11/19 at 9:00 AM Physical therapy (30 min appt)  [facility] [employer] will  between 8:00 AM-8:15 AM  Trip # [id number]    APPT: Wednesday, 9/18/19 at 9:00 AM Physical therapy (30 min appt)  [facility] [employer] will  between 8:00 AM-8:15 AM  Trip # [id number]    APPT: Wed, 9/25/19 at 10:30 AM for Physical Therapy  [facility] [employer] will  between 9:30-10:00 AM  Trip # [id number]

## 2019-08-22 NOTE — LETTER
August 22, 2019      Ed Say  [address]   [place] [address]        Dear Ed,    ORTHOPEDICS ADULT REFERRAL  [facility]  Phone: [phone]  Fax: [phone]    [facility]  [address] [place] [address]    Appointment:  Tuesday August 27th  Arrival Time:  8:36am     Please bring in a copy of your insurance card and photo ID.    If you cannot make this appointment, please contact [facility] at [phone] to reschedule.    Sincerely,    Gogo Ornelas

## 2019-08-22 NOTE — LETTER
August 22, 2019      Moo Say  [address]   [place] [address]        Dear Ed,    ORTHOPEDICS ADULT REFERRAL  [facility]  Phone: [phone]  Fax: [phone]    [facility] ([facility])  [address] [place] [address]    Appointment:  Tuesday August 27th  Arrival Time:  8:30am     Please bring in a copy of your insurance card and photo ID.    If you cannot make this appointment, please contact [facility] at [phone] to reschedule.    [facility] [employer] will pick you up between 7:30-7:45am  Confirmation code: [id number]    Sincerely,    Jenny Sharpe MD

## 2019-08-27 ENCOUNTER — TRANSFERRED RECORDS (OUTPATIENT)
Dept: HEALTH INFORMATION MANAGEMENT | Facility: CLINIC | Age: 39
End: 2019-08-27

## 2019-08-28 NOTE — PROGRESS NOTES
Preceptor Attestation:   Patient seen, evaluated and discussed with the resident. I have verified the content of the note, which accurately reflects my assessment of the patient and the plan of care.   Supervising Physician:  Darryl Daniels MD MD

## 2019-09-06 NOTE — NURSING NOTE
Due to patient being non-English speaking/uses sign language, an  was used for this visit. Only for face-to-face interpretation by an external agency, date and length of interpretation can be found on the scanned worksheet.       name: Calvin Centeno (Htoo)  Language: Nia  Agency:  [employer]  Phone number: [phone]  Type of interpretation:  Face-to-face, spoken

## 2019-11-11 NOTE — PROGRESS NOTES
S: Ed Rangel is a 39 year old female with a PMH of glaucoma of right eye, migraines, and history of lung granuloma presenting to clinic today for check of throat.    -Sore throat and headache.  Fever started Sunday, sore throat started on Sunday as well.  Always has headache (seems like this is a chronic migraine or tension type headache), but has been worse since Sunday.  Little bit of runny nose.  No cough.  No trouble breathing or chest pain.  Nobody else has been sick at home, but she has been around her 5 kids at home.  Oldest is 13, youngest is 4.5 months old.  They have been healthy.  Has tried Tylenol for her symptoms; last took Tylenol about 6am.  Hasn't tried Ibuprofen yet.  Tylenol helps, but then symptoms return.  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  She states that it is quite painful to swallow solids or liquids, but she has been able to swallow without difficulty.  States that she has had a bit of blurry vision in her left eye as well.  Last went to the ophthalmologist earlier this year, and she is not aware of her next follow-up visit.    Patient Active Problem List   Diagnosis     Absolute glaucoma, right eye     Blind right eye     Lung granuloma (H)     Microphthalmia with cataract     Migraine     Language barrier, cultural differences     Betel deposit on teeth     Acute pain of right knee     Current Outpatient Medications   Medication Sig Dispense Refill     acetaminophen (TYLENOL) 500 MG tablet Take 1-2 tablets (500-1,000 mg) by mouth every 6 hours as needed for mild pain 90 tablet 3     amoxicillin (AMOXIL) 500 MG capsule Take 1 capsule (500 mg) by mouth 2 times daily for 10 days 20 capsule 0     ibuprofen (ADVIL/MOTRIN) 600 MG tablet Take 1 tablet (600 mg) by mouth every 6 hours as needed for moderate pain 90 tablet 0     Prenatal Vit-Fe Fumarate-FA (PRENATAL MULTIVITAMIN W/IRON) 27-0.8 MG tablet Take 1 tablet by mouth daily 100 tablet 3      "timolol maleate (TIMOPTIC) 0.5 % ophthalmic solution Place 1 drop into both eyes 2 times daily 5 mL 11     order for DME Equipment being ordered: double electric breast pump 1 Units 0     sertraline (ZOLOFT) 50 MG tablet Take 1 tablet (50 mg) by mouth daily 90 tablet 3     O: /87   Pulse 70   Temp 98  F (36.7  C) (Oral)   Resp 16   Ht 1.613 m (5' 3.5\")   Wt 87.5 kg (192 lb 12.8 oz)   SpO2 98%   BMI 33.62 kg/m     Constitutional:  Well nourished, but appears uncomfortable, appears ill but nontoxic..  Eyes: EOMI. tearing is noted from both eyes.  Right eye has a cloudy-like appearance.  Left eye has some conjunctival injection, but extraocular movements are intact and light reflex seems to be intact as well.  Head: Atraumatic, normocephalic.  ENT/Mouth: TMs normal color and landmarks, she does have a clear, white patch over the anterior portion of the right TM; nasopharynx erythematous, moist, edematous with clear drainage; oropharynx erythematous, with bilateral tonsillar hypertrophy and positive pockets noted more prominent on the left tonsil.  No definitive exudates.  Neck: Supple without cervical or supraclavicular lymphadenopathy.  There is tenderness to palpation over the anterior neck.  CV:  RRR  - no murmurs noted.   Respiratory:  CTAB, no wheezes or crackles noted, good air entry in the posterior thorax.  No increased work of breathing.  Psych: Affect slightly flat, appears uncomfortable, mentating appropriately.     Assessment and Plan:  Ed was seen today for recheck and refill request.    Diagnoses and all orders for this visit:    Acute pharyngitis, unspecified etiology  -39-year-old female, previously healthy, history of glaucoma in the right eye, coming in with 2 days of fever and pharyngitis, bilateral tonsillar hypertrophy with pus pockets and appears miserable on exam.  Hemodynamically stable and no fever here in clinic.  She is around small children all day, this seems most consistent " with strep pharyngitis, no definitive abscess identified on exam.  -     Rapid Strep Screen (Group) ([facility])--> positive.  -     ketorolac (TORADOL) injection 30 mg given here in clinic today.  -     We will treat with amoxicillin (AMOXIL) 500 MG capsule; Take 1 capsule (500 mg) by mouth 2 times daily for 10 days.  Stressed importance of taking the full course, even if she starts to feel better, to prevent rheumatic fever.  She will alternate Tylenol and ibuprofen for pain control.  Warning signs to return discussed as noted below.  -     ibuprofen (ADVIL/MOTRIN) 600 MG tablet; Take 1 tablet (600 mg) by mouth every 6 hours as needed for moderate pain  -     acetaminophen (TYLENOL) 500 MG tablet; Take 1-2 tablets (500-1,000 mg) by mouth every 6 hours as needed for mild pain    RTC PRN if symptoms worsen, unable to swallow, unable to tolerate pain, or fever uncontrolled.    The patient was discussed and evaluated with Dr. Feliz MD.    Robert Roman, PGY-3  [facility]  Pager:  [phone]

## 2019-11-12 ENCOUNTER — OFFICE VISIT (OUTPATIENT)
Dept: FAMILY MEDICINE | Facility: CLINIC | Age: 39
End: 2019-11-12
Payer: COMMERCIAL

## 2019-11-12 VITALS
RESPIRATION RATE: 16 BRPM | OXYGEN SATURATION: 98 % | SYSTOLIC BLOOD PRESSURE: 121 MMHG | DIASTOLIC BLOOD PRESSURE: 87 MMHG | WEIGHT: 192.8 LBS | BODY MASS INDEX: 32.91 KG/M2 | HEIGHT: 64 IN | HEART RATE: 70 BPM | TEMPERATURE: 98 F

## 2019-11-12 DIAGNOSIS — J02.9 ACUTE PHARYNGITIS, UNSPECIFIED ETIOLOGY: Primary | ICD-10-CM

## 2019-11-12 LAB — S PYO AG THROAT QL IA.RAPID: POSITIVE

## 2019-11-12 RX ORDER — KETOROLAC TROMETHAMINE 30 MG/ML
30 INJECTION, SOLUTION INTRAMUSCULAR; INTRAVENOUS ONCE
Status: DISCONTINUED | OUTPATIENT
Start: 2019-11-12 | End: 2019-11-12 | Stop reason: CLARIF

## 2019-11-12 RX ORDER — AMOXICILLIN 500 MG/1
500 CAPSULE ORAL 2 TIMES DAILY
Qty: 20 CAPSULE | Refills: 0 | Status: SHIPPED | OUTPATIENT
Start: 2019-11-12 | End: 2020-01-29

## 2019-11-12 RX ORDER — IBUPROFEN 600 MG/1
600 TABLET, FILM COATED ORAL EVERY 6 HOURS PRN
Qty: 90 TABLET | Refills: 0 | Status: SHIPPED | OUTPATIENT
Start: 2019-11-12 | End: 2020-01-29

## 2019-11-12 RX ORDER — KETOROLAC TROMETHAMINE 30 MG/ML
60 INJECTION, SOLUTION INTRAMUSCULAR; INTRAVENOUS ONCE
Status: COMPLETED | OUTPATIENT
Start: 2019-11-12 | End: 2019-11-12

## 2019-11-12 RX ORDER — ACETAMINOPHEN 500 MG
500-1000 TABLET ORAL EVERY 6 HOURS PRN
Qty: 90 TABLET | Refills: 3 | Status: SHIPPED | OUTPATIENT
Start: 2019-11-12 | End: 2020-05-06

## 2019-11-12 RX ADMIN — KETOROLAC TROMETHAMINE 60 MG: 30 INJECTION, SOLUTION INTRAMUSCULAR; INTRAVENOUS at 10:40

## 2019-11-12 ASSESSMENT — MIFFLIN-ST. JEOR: SCORE: 1516.6

## 2019-11-12 NOTE — PATIENT INSTRUCTIONS
Moo Say,    You have strep throat infection.  Here is the plan:    1)  Amoxicillin antibiotic.  1 pill twice a day for the next 10 days.  Take the complete course!  2)  Ibuprofen 600 mg every 6 hours with food over the next 2 to 3 days.  3)  Tylenol 500 mg every 6 hours over the next 2 to 3 days.  4)  If pain gets worse, you have trouble swallowing, or cannot tolerate eating or drinking, please come back to clinic.  Thank you!    Sincerely,    Dr. Roman

## 2019-11-12 NOTE — PROGRESS NOTES
Preceptor Attestation:   Patient seen, evaluated and discussed with the resident. I have verified the content of the note, which accurately reflects my assessment of the patient and the plan of care.   Supervising Physician:  Lul Piper MD

## 2019-11-12 NOTE — NURSING NOTE
Clinic Administered Medication Documentation    MEDICATION LIST:   Injectable Medication Documentation    Patient was given Ketorolac Tromethamine (Toradol). Prior to medication administration, verified patients identity using patient s name and date of birth. Please see MAR and medication order for additional information. Patient instructed to remain in clinic for 15 minutes.      Was entire vial of medication used? No, The remainder 30MG of 1ML was discarded as unavoidable waste.  Vial/Syringe: Single dose vial  Expiration Date:  02/2021  Was this medication supplied by the patient? No    Name of provider who requested the medication administration:   Name of provider on site (faculty or community preceptor) at the time of performing the medication administration:      Date of next administration: N/A  Date of next office visit with provider to renew medication plan (must be seen annually): 11/12/2020    Law ROSLYN Mullen

## 2019-11-12 NOTE — NURSING NOTE
Due to patient being non-English speaking/uses sign language, an  was used for this visit. Only for face-to-face interpretation by an external agency, date and length of interpretation can be found on the scanned worksheet.     name: Francisco Ward  Agency: [employer]  Language: Nia   Telephone number: [phone]  Type of interpretation: Face-to-face, spoken    Law ROSLYN Mullen

## 2020-01-29 ENCOUNTER — OFFICE VISIT (OUTPATIENT)
Dept: FAMILY MEDICINE | Facility: CLINIC | Age: 40
End: 2020-01-29
Payer: COMMERCIAL

## 2020-01-29 VITALS
OXYGEN SATURATION: 96 % | RESPIRATION RATE: 16 BRPM | DIASTOLIC BLOOD PRESSURE: 85 MMHG | TEMPERATURE: 98.9 F | SYSTOLIC BLOOD PRESSURE: 137 MMHG | BODY MASS INDEX: 35.05 KG/M2 | HEART RATE: 68 BPM | WEIGHT: 201 LBS

## 2020-01-29 DIAGNOSIS — R05.9 COUGH: Primary | ICD-10-CM

## 2020-01-29 DIAGNOSIS — J06.9 VIRAL URI WITH COUGH: ICD-10-CM

## 2020-01-29 DIAGNOSIS — G43.909 MIGRAINE WITHOUT STATUS MIGRAINOSUS, NOT INTRACTABLE, UNSPECIFIED MIGRAINE TYPE: ICD-10-CM

## 2020-01-29 RX ORDER — IBUPROFEN 600 MG/1
600 TABLET, FILM COATED ORAL EVERY 6 HOURS PRN
Qty: 90 TABLET | Refills: 0 | Status: SHIPPED | OUTPATIENT
Start: 2020-01-29 | End: 2020-05-06

## 2020-01-29 RX ORDER — BENZONATATE 200 MG/1
200 CAPSULE ORAL 3 TIMES DAILY PRN
Qty: 30 CAPSULE | Refills: 0 | Status: SHIPPED | OUTPATIENT
Start: 2020-01-29 | End: 2020-05-06

## 2020-01-29 RX ORDER — GUAIFENESIN 600 MG/1
600 TABLET, EXTENDED RELEASE ORAL 2 TIMES DAILY PRN
Qty: 20 TABLET | Refills: 0 | Status: SHIPPED | OUTPATIENT
Start: 2020-01-29 | End: 2020-05-06

## 2020-01-29 NOTE — PROGRESS NOTES
SUBJECTIVE       Ed Rangel is a 39 year old  female with a PMH significant for:     Patient Active Problem List   Diagnosis     Absolute glaucoma, right eye     Blind right eye     Lung granuloma (H)     Microphthalmia with cataract     Migraine     Language barrier, cultural differences     Betel deposit on teeth     Acute pain of right knee     Patient presents with:  Headache: gets chronic migraine- states that it's worse when she coughs  Cough: x week, does cough up some mucus    For the past week, she has had intermittent migraines and cough. Symptoms started with cough. Headaches are worse with coughing. These feel like her typical migraines. She has had headaches since she was a teenager. She takes ibuprofen for migraines, and this helps a little but she ran out of medication. States she has mild headaches every day but some days are worse than others. Last week, she had a fever that resolved. No sore throat, ear pain, vomiting, diarrhea, muscle aches or joint pains, difficulty breathing or shortness of breath. She had back pain the other day that has improved. Denies dysuria or urinary symptoms. She does endorse rhinorrhea and nausea, which is typical for migraine for her. Her daughter was sick and her  is currently sick with similar symptoms.     PMH, Medications and Allergies were reviewed and updated as needed.    ROS: As above per HPI        OBJECTIVE     Vitals:    01/29/20 1116   BP: 137/85   Pulse: 68   Resp: 16   Temp: 98.9  F (37.2  C)   TempSrc: Oral   SpO2: 96%   Weight: 91.2 kg (201 lb)     Body mass index is 35.05 kg/m .    GENERAL: No acute distress, non-toxic appearing  HEAD: Atraumatic, normocephalic  EYES: PERRL, EOMI, no scleral or conjunctival injection, anicteric  EARS: Normal pinnae, bilateral translucent and nonbulging TMs, no drainage  NOSE: Septum midline, no discharge  THROAT: Moist mucous membranes, oropharynx is patent, no tonsillar swelling, exudate or asymmetry, no  oral lesions, voice is clear, no trismus, coughing  NECK: Supple with full ROM, trachea midline  CV: Regular rate and rhythm, no murmurs or rubs  LUNGS: Coughing, respirations unlabored, coarse breath sounds bilaterally, difficult to appreciate crackles secondary due coughing, no wheeze  EXT: No peripheral edema, atraumatic  SKIN: No rash, warm and dry  NEURO: Alert, coherent, interacts appropriately, no gross neurologic deficits  PSYCH: euthymic, normal affect, thought content is appropriate      LABS/IMAGING/EKG  No results found for this or any previous visit (from the past 24 hour(s)).     CXR; 2 view without focal consolidation or other concerning abnormality.    ASSESSMENT AND PLAN     1. Cough  Viral URI with cough  Here for 1 week of cough with resolution of fevers.  No red flag symptoms on her history.  Vitals notable for oxygen saturation of 96% but otherwise normal.  Her physical exam is remarkable for coarse breath sounds and therefore obtain a chest x-ray here in clinic that was normal.  Discussed symptomatic cares and return precautions.  Written instructions were provided.  - XR CHEST 2 VW  - ibuprofen (ADVIL/MOTRIN) 600 MG tablet; Take 1 tablet (600 mg) by mouth every 6 hours as needed for moderate pain  Dispense: 90 tablet; Refill: 0  - guaiFENesin (MUCINEX) 600 MG 12 hr tablet; Take 1 tablet (600 mg) by mouth 2 times daily as needed for congestion or cough  Dispense: 20 tablet; Refill: 0  - benzonatate (TESSALON) 200 MG capsule; Take 1 capsule (200 mg) by mouth 3 times daily as needed for cough  Dispense: 30 capsule; Refill: 0    2. Migraine without status migrainosus, not intractable, unspecified migraine type  Patient also describes daily headaches that have been worse with coughing over the last week. It does not appear that she has been on any prophylactic medication in the past.  Symptoms do improve with ibuprofen and Tylenol.  Discussed scheduling follow-up in the next couple of weeks to  discuss this issue further and trial of prophylactic medication for migraines.  It does sound that these migraines have not changed in nature and have been present since she has been a teenager.      Options for treatment and follow-up care were reviewed with patient's parent who was engaged and actively involved in the decision making process, verbalized understanding of the options discussed, and satisfied with the final plan.      Janeth Florez MD PGY3  [facility]  [phone] (P)    Discussed with Dr. Moreno who agrees with the above assessment and plan.

## 2020-01-29 NOTE — PATIENT INSTRUCTIONS
You were seen today for cough. As we discussed, this is most likely due to a viral infection.      You are being sent with a prescription for guaifenesin (an expectorant) and tessalon. You can take either or both as needed for coughing and chest congestion Please follow instructions for use. Do not use cough medications in children under age 5.    If you start to feel congested, you can try a medication like Sudafed or a nasal spray. Either of these can be purchased over the counter.     You might also try throat lozenges, hot tea, or honey. Do not give honey to children less than 1 years old.      Please be seen again right away if you experience difficulty breathing/shortness of breath, worsening cough, new chest pain, confusion, dehydration, or for any other new symptoms. Otherwise, please follow up with your primary doctor within 3-5 days if not improving.     Below is some information you might find useful.     Patient Education     Acute Bronchitis  Your healthcare provider has told you that you have acute bronchitis. Bronchitis is infection or inflammation of the bronchial tubes (airways in the lungs). Normally, air moves easily in and out of the airways. Bronchitis narrows the airways, making it harder for air to flow in and out of the lungs. This causes symptoms such as shortness of breath, coughing up yellow or green mucus, and wheezing. Bronchitis can be acute or chronic. Acute means the condition comes on quickly and goes away in a short time, usually within 3 to 10 days. Chronic means a condition lasts a long time and often comes back.    What causes acute bronchitis?  Acute bronchitis almost always starts as a viral respiratory infection, such as a cold or the flu. Certain factors make it more likely for a cold or flu to turn into bronchitis. These include being very young, being elderly, having a heart or lung problem, or having a weak immune system. Cigarette smoking also makes bronchitis more  likely.  When bronchitis develops, the airways become swollen. The airways may also become infected with bacteria. This is known as a secondary infection.  Diagnosing acute bronchitis  Your healthcare provider will examine you and ask about your symptoms and health history. You may also have a sputum culture to test the fluid in your lungs. Chest X-rays may be done to look for infection in the lungs.  Treating acute bronchitis  Bronchitis usually clears up as the cold or flu goes away. You can help feel better faster by doing the following:    Take medicine as directed. You may be told to take ibuprofen or other over-the-counter medicines. These help relieve inflammation in your bronchial tubes. Your healthcare provider may prescribe an inhaler to help open up the bronchial tubes. Most of the time, acute bronchitis is caused by a viral infection. Antibiotics are usually not prescribed for viral infections.    Drink plenty of fluids, such as water, juice, or warm soup. Fluids loosen mucus so that you can cough it up. This helps you breathe more easily. Fluids also prevent dehydration.    Make sure you get plenty of rest.    Do not smoke. Do not allow anyone else to smoke in your home.  Recovery and follow-up  Follow up with your doctor as you are told. You will likely feel better in a week or two. But a dry cough can linger beyond that time. Let your doctor know if you still have symptoms (other than a dry cough) after 2 weeks, or if you re prone to getting bronchial infections. Take steps to protect yourself from future infections. These steps include stopping smoking and avoiding tobacco smoke, washing your hands often, and getting a yearly flu shot.  When to call your healthcare provider  Call the healthcare provider if you have any of the following:    Fever of 100.4 F (38.0 C) or higher, or as advised    Symptoms that get worse, or new symptoms    Trouble breathing    Symptoms that don t start to improve within  a week, or within 3 days of taking antibiotics   Date Last Reviewed: 12/1/2016 2000-2019 [employer]. [address] [place] [address], [place] [address]. All rights reserved. This information is not intended as a substitute for professional medical care. Always follow your healthcare professional's instructions.

## 2020-01-29 NOTE — PROGRESS NOTES
Preceptor Attestation:   Patient seen, evaluated and discussed with the resident. I personally reviewed the imaging and agree with the interpretation documented by the resident. I have verified the content of the note, which accurately reflects my assessment of the patient and the plan of care.   Supervising Physician:  Brandan Moreno MD.

## 2020-01-29 NOTE — NURSING NOTE
Due to patient being non-English speaking/uses sign language, an  was used for this visit. Only for face-to-face interpretation by an external agency, date and length of interpretation can be found on the scanned worksheet.       name:  Mau Verduzco  Agency: [employer]  Language: Nia  Telephone number:   [phone]  Type of interpretation:  Face-to-face, Spoken

## 2020-01-30 PROBLEM — R91.8 PULMONARY NODULES: Status: ACTIVE | Noted: 2020-01-30

## 2020-01-30 NOTE — RESULT ENCOUNTER NOTE
Please call patient to help her set up a follow up appointment to discuss migraines and review her chest x ray results. No changes to the plan we made in clinic yesterday--her chest x-ray does still show that this is most likely a viral infection, however there is a low concern additional finding (5 mm nodule) that I would like to address with her. Thanks.

## 2020-02-19 ENCOUNTER — ALLIED HEALTH/NURSE VISIT (OUTPATIENT)
Dept: FAMILY MEDICINE | Facility: CLINIC | Age: 40
End: 2020-02-19
Payer: COMMERCIAL

## 2020-02-19 VITALS
SYSTOLIC BLOOD PRESSURE: 125 MMHG | DIASTOLIC BLOOD PRESSURE: 85 MMHG | OXYGEN SATURATION: 97 % | RESPIRATION RATE: 16 BRPM | HEART RATE: 60 BPM | TEMPERATURE: 97.4 F

## 2020-02-19 DIAGNOSIS — Z23 NEED FOR PROPHYLACTIC VACCINATION AND INOCULATION AGAINST INFLUENZA: Primary | ICD-10-CM

## 2020-02-19 NOTE — NURSING NOTE
Due to patient being non-English speaking/uses sign language, an  was used for this visit. Only for face-to-face interpretation by an external agency, date and length of interpretation can be found on the scanned worksheet.       name:  Mau Verduzco  Agency: [employer]  Language: Nia  Telephone number:   [phone]  Type of interpretation:  Face-to-face, Spoken groups 2

## 2020-04-02 ENCOUNTER — TELEPHONE (OUTPATIENT)
Dept: FAMILY MEDICINE | Facility: CLINIC | Age: 40
End: 2020-04-02

## 2020-04-02 NOTE — TELEPHONE ENCOUNTER
Reached out to patient during COVID19 Clinic outreach. Reassured patient that [facility] is still open and has started implementing phone and video appointments to help patient remain safe at home.     Patient reports the following concerns: Continues to have headaches and associated nausea and dizziness. Willing to have an appointment to discuss further.     Per patient request, patient is scheduled for a visit to address their concerns on the following date: TBD. In the next 1-2 weeks.     Offered MyChart. Patient declined.    Note will be routed to [facility] to assist in addressing patient concerns and/or to schedule a visit.     Azul Olivares MD, PGY2

## 2020-05-06 ENCOUNTER — VIRTUAL VISIT (OUTPATIENT)
Dept: FAMILY MEDICINE | Facility: CLINIC | Age: 40
End: 2020-05-06
Payer: COMMERCIAL

## 2020-05-06 DIAGNOSIS — G43.909 MIGRAINE WITHOUT STATUS MIGRAINOSUS, NOT INTRACTABLE, UNSPECIFIED MIGRAINE TYPE: Primary | ICD-10-CM

## 2020-05-06 RX ORDER — ACETAMINOPHEN 500 MG
500-1000 TABLET ORAL EVERY 6 HOURS PRN
Qty: 90 TABLET | Refills: 3 | Status: SHIPPED | OUTPATIENT
Start: 2020-05-06 | End: 2021-02-24

## 2020-05-06 RX ORDER — IBUPROFEN 600 MG/1
600 TABLET, FILM COATED ORAL EVERY 6 HOURS PRN
Qty: 90 TABLET | Refills: 0 | Status: SHIPPED | OUTPATIENT
Start: 2020-05-06 | End: 2022-03-02

## 2020-05-06 NOTE — PROGRESS NOTES
"Family Medicine Telephone Visit Note               Telephone Visit Consent   Patient was verbally read the following and verbal consent was obtained.    \"Telephone visits are billed at different rates depending on your insurance coverage. During this emergency period, for some insurers they may be billed the same as an in-person visit.  Please reach out to your insurance provider with any questions.  If during the course of the call the physician/provider feels a telephone visit is not appropriate, you will not be charged for this service.\"    Name person giving consent:  Patient   Date verbal consent given:  5/6/2020  Time verbal consent given:  3:16 PM       Chief Complaint   Patient presents with     Headache     headache and dizziness possible from medication       Due to patient being non-English speaking/uses sign language, an  was used for this visit. Only for face-to-face interpretation by an external agency, date and length of interpretation can be found on the scanned worksheet.     name: Tj Trejo  Language: Nia  Agency: [employer]  Phone number: [phone]  Type of interpretation: Telephone, spoken           HPI   Patients name: Moo  Appointment start time:  3:16 PM    Moo calls in due to concerns of headache and dizziness. She has taken mucinex with relief of the symptoms. She sometimes has a headache after coming in from outside. Lights and loud sounds make the headache worse. Sometimes she gets nausea with the headaches. She has a history of migraines and these headaches feel the same. She usually takes tylenol and ibuprofen with relief of the pain but is out of the medication. She also states there is a medication she has gotten in the past that comes in a packet but does not know the name.      She also stopped taking her zoloft because she does not want to take it anymore. She ran out of it a while ago but is unsure when.     She has no cough, runny nose, or dizziness. No " "fevers or chills.      Current Outpatient Medications   Medication Sig Dispense Refill     acetaminophen (TYLENOL) 500 MG tablet Take 1-2 tablets (500-1,000 mg) by mouth every 6 hours as needed for mild pain (Patient not taking: Reported on 1/29/2020) 90 tablet 3     benzonatate (TESSALON) 200 MG capsule Take 1 capsule (200 mg) by mouth 3 times daily as needed for cough (Patient not taking: Reported on 5/6/2020) 30 capsule 0     guaiFENesin (MUCINEX) 600 MG 12 hr tablet Take 1 tablet (600 mg) by mouth 2 times daily as needed for congestion or cough (Patient not taking: Reported on 5/6/2020) 20 tablet 0     ibuprofen (ADVIL/MOTRIN) 600 MG tablet Take 1 tablet (600 mg) by mouth every 6 hours as needed for moderate pain (Patient not taking: Reported on 5/6/2020) 90 tablet 0     order for DME Equipment being ordered: double electric breast pump (Patient not taking: Reported on 1/29/2020) 1 Units 0     Prenatal Vit-Fe Fumarate-FA (PRENATAL MULTIVITAMIN W/IRON) 27-0.8 MG tablet Take 1 tablet by mouth daily (Patient not taking: Reported on 1/29/2020) 100 tablet 3     sertraline (ZOLOFT) 50 MG tablet Take 1 tablet (50 mg) by mouth daily (Patient not taking: Reported on 1/29/2020) 90 tablet 3     timolol maleate (TIMOPTIC) 0.5 % ophthalmic solution Place 1 drop into both eyes 2 times daily (Patient not taking: Reported on 1/29/2020) 5 mL 11     No Known Allergies           Review of Systems:     ROS COMP: Constitutional, HEENT, cardiovascular, pulmonary, gi and gu systems are negative, except as otherwise noted.         Physical Exam:     There were no vitals taken for this visit.  Estimated body mass index is 35.05 kg/m  as calculated from the following:    Height as of 11/12/19: 1.613 m (5' 3.5\").    Weight as of 1/29/20: 91.2 kg (201 lb).    Exam:  Constitutional: healthy, alert and no distress  Psychiatric: mentation appears normal and affect normal/bright        Assessment and Plan     1. Migraine without status " migrainosus, not intractable, unspecified migraine type  Moo Say calls in as she continues to have migraine headaches and is out of Tylenol and ibuprofen. She is not taking a prophylactic medication as tylenol and ibuprofen work well for her. Recommended to follow up in 2-3 weeks to check in. If she continues to have headaches could consider adding a prophylactic medication. Discussed return precautions, she was in agreement with this plan.   - ibuprofen (ADVIL/MOTRIN) 600 MG tablet; Take 1 tablet (600 mg) by mouth every 6 hours as needed for moderate pain  Dispense: 90 tablet; Refill: 0  - acetaminophen (TYLENOL) 500 MG tablet; Take 1-2 tablets (500-1,000 mg) by mouth every 6 hours as needed for mild pain  Dispense: 90 tablet; Refill: 3      Refilled medications that would be required in the next 3 months.     After Visit Information:  Patient declined AVS     No follow-ups on file.    Appointment end time: 3:33 PM  This is a telephone visit that took 17 minutes.      Clinician location:  [facility]    Adrian Blakely MD  I precepted today with Dr. Moreno.

## 2020-05-06 NOTE — PROGRESS NOTES
Preceptor Attestation:    I talked to the patient on the phone and discussed the patient with the resident. I have verified the content of the note, which accurately reflects my assessment of the patient and the plan of care.   Supervising Physician:  Brandan Moreno MD.

## 2020-05-06 NOTE — TELEPHONE ENCOUNTER
While speaking with patient about her son, she reports she continued to have headaches and nausea. Patient transferred with  to  for scheduling. ./LR

## 2020-09-15 NOTE — PROGRESS NOTES
JOHN Rangel is a 40 year old  female with a PMH significant for:     Patient Active Problem List   Diagnosis     Absolute glaucoma, right eye     Blind right eye     Lung granuloma (H)     Microphthalmia with cataract     Migraine     Language barrier, cultural differences     Betel deposit on teeth     Acute pain of right knee     Pulmonary nodules     She presents with anxiety and headaches.     Anxiety: Patient started on sertraline 8/1/2019 ago for postpartum depression, but discontinued the medication prior to May, 2020 visit as she didn't like taking the medication.     She has been feeling more anxiety lately, which has been going on for about one month. She is having difficulty sleeping and concentrating. She also has occasional palpations and fatigue. She does have feelings of sadness and depression, and some days she thinks about this a lot which disturbs her. She sometimes gets to a point when she doesn't want to live at all. She does not have any thoughts of self harm or suicide. She has talked to her  about her anxiety and he has been supportive. She is open to restarting sertraline and also seeing a counselor.      She has a history of migraines and they continue to be an issue for her. She has associated nausea, photophobia, blurry vision, lightheadedness. Onset can be gradual or sudden. The pain is sometimes one-sided and sometimes involves her whole head. She is taking Tylenol and ibuprofen for the pain which helps a little. Her symptoms improve when she lays in a quiet, dark room, but this is difficult as she is caring for her children every day. She does not have any vomiting, numbness, tingling, or focal weakness.       Today's PHQ-9 Score:   PHQ-9 SCORE 9/16/2020   PHQ-9 Total Score 11          KAYLAH Score:  KAYLAH-7 SCORE 9/16/2020   Total Score 9     PMH, social history, family history, medications and allergies were reviewed and updated as needed.        REVIEW OF  SYSTEMS     See HPI         OBJECTIVE     Vitals:    09/16/20 1026   BP: 105/72   Pulse: 63   Resp: 16   Temp: 98  F (36.7  C)   TempSrc: Oral   SpO2: 97%   Weight: 93.9 kg (207 lb)     Body mass index is 36.09 kg/m .    Constitutional: Awake, alert, cooperative, no apparent distress, and appears stated age.  Head: Normocephalic, without obvious abnormality, atraumatic  Eyes: No conjunctival injection bilaterally or tearing of eyes.   Lungs: No increased work of breathing, good air exchange, clear to auscultation bilaterally, no crackles or wheezing.  Cardiovascular: Regular rate and rhythm, normal S1 and S2, no S3 or S4, and no murmur noted.  Neurologic: Awake, alert, oriented to name, place and time.  Cranial nerves II-XII are intact.    Psychiatric: Appears sad, uncomfortable, normal orientatation, memory and insight.  Skin: No rashes, erythema, pallor, petechia or purpura.    No results found for this or any previous visit (from the past 24 hour(s)).        ASSESSMENT AND PLAN     1) Anxiety and depression  - Patient having passive suicidal ideation, but no active plan. She appears depressed with a flattened affect on exam today. We will restart sertraline, 25 mg for 1 week and then 50 mg after that and sent a referral for counseling. Patient may require additional dose titration. Also provided crisis resources for patient today. Plan for close follow up.     2) Headache  - Her presentation is suspicious for migraine headaches with nausea, photophobia, and improvement upon laying in a dark room. She had been prescribed nortriptyline in the past. We will trial sumatriptan, 50mg at the start of a headache and repeated again in 2 hours if pain is persistent. If symptoms persist despite sumatriptan, we can consider starting a daily prophylactic medication. Would consider topiramate due to less possible interaction with sertraline.     3) Glaucoma  - There is the possibility that her nausea, headaches, and  photophobia are due to glaucoma. No evidence of acute angle glaucoma on exam today though. We discussed the importance of following up with her eye doctor today for further evaluation.       RTC in 2 weeks for follow up of MDD and headaches, or sooner if develops new or worsening symptoms.    Zoila Galeana, MS4    I was present with the medical student who participated in the service and in the documentation of this note. I have verified the history and personally performed the physical exam and medical decision making, and have verified the content of the note, which accurately reflects my assessment of the patient and the plan of care.    Azul Olivares MD      Patient discussed with Dr. Wright who agrees with the assessment and plan.     Azul Olivares MD, PGY3

## 2020-09-16 ENCOUNTER — OFFICE VISIT (OUTPATIENT)
Dept: FAMILY MEDICINE | Facility: CLINIC | Age: 40
End: 2020-09-16
Payer: COMMERCIAL

## 2020-09-16 VITALS
DIASTOLIC BLOOD PRESSURE: 72 MMHG | HEART RATE: 63 BPM | WEIGHT: 207 LBS | SYSTOLIC BLOOD PRESSURE: 105 MMHG | TEMPERATURE: 98 F | RESPIRATION RATE: 16 BRPM | BODY MASS INDEX: 36.09 KG/M2 | OXYGEN SATURATION: 97 %

## 2020-09-16 DIAGNOSIS — R51.9 CHRONIC NONINTRACTABLE HEADACHE, UNSPECIFIED HEADACHE TYPE: ICD-10-CM

## 2020-09-16 DIAGNOSIS — F33.1 MODERATE EPISODE OF RECURRENT MAJOR DEPRESSIVE DISORDER (H): Primary | ICD-10-CM

## 2020-09-16 DIAGNOSIS — G89.29 CHRONIC NONINTRACTABLE HEADACHE, UNSPECIFIED HEADACHE TYPE: ICD-10-CM

## 2020-09-16 DIAGNOSIS — H40.9 GLAUCOMA, UNSPECIFIED GLAUCOMA TYPE, UNSPECIFIED LATERALITY: ICD-10-CM

## 2020-09-16 DIAGNOSIS — Z23 NEED FOR PROPHYLACTIC VACCINATION AND INOCULATION AGAINST INFLUENZA: ICD-10-CM

## 2020-09-16 DIAGNOSIS — F41.9 ANXIETY: ICD-10-CM

## 2020-09-16 RX ORDER — SUMATRIPTAN 50 MG/1
50 TABLET, FILM COATED ORAL
Qty: 30 TABLET | Refills: 0 | Status: SHIPPED | OUTPATIENT
Start: 2020-09-16 | End: 2020-09-30

## 2020-09-16 ASSESSMENT — ANXIETY QUESTIONNAIRES
2. NOT BEING ABLE TO STOP OR CONTROL WORRYING: SEVERAL DAYS
5. BEING SO RESTLESS THAT IT IS HARD TO SIT STILL: NOT AT ALL
1. FEELING NERVOUS, ANXIOUS, OR ON EDGE: MORE THAN HALF THE DAYS
6. BECOMING EASILY ANNOYED OR IRRITABLE: SEVERAL DAYS
3. WORRYING TOO MUCH ABOUT DIFFERENT THINGS: SEVERAL DAYS
7. FEELING AFRAID AS IF SOMETHING AWFUL MIGHT HAPPEN: MORE THAN HALF THE DAYS
IF YOU CHECKED OFF ANY PROBLEMS ON THIS QUESTIONNAIRE, HOW DIFFICULT HAVE THESE PROBLEMS MADE IT FOR YOU TO DO YOUR WORK, TAKE CARE OF THINGS AT HOME, OR GET ALONG WITH OTHER PEOPLE: SOMEWHAT DIFFICULT
GAD7 TOTAL SCORE: 9

## 2020-09-16 ASSESSMENT — PATIENT HEALTH QUESTIONNAIRE - PHQ9
5. POOR APPETITE OR OVEREATING: MORE THAN HALF THE DAYS
SUM OF ALL RESPONSES TO PHQ QUESTIONS 1-9: 11

## 2020-09-16 NOTE — PATIENT INSTRUCTIONS
Mood  - Restart sertraline 25 mg (1/2 tablet for 1 week), then increase to 50 mg daily (1 tablet)   - Referral for counseling placed today  - Follow up in 2 weeks     Headaches  - Start sumatriptan 1 tablet at the start of bad headache, can repeat 2 hours later if needed   - Don't use more than 10 days per month   - Can take acetaminophen 1000 mg three times daily as needed for less severe headaches   - We will consider starting daily medication if     Follow up with the eye doctor     If you have thoughts about self harm or if you just need additional support and care, here are some resources for you:  Crisis Lines:    [facility]:  [phone]   COVID-19 UPDATE (3/20/2020): Still open and taking calls 24/7, limiting some in-person visits if patient has symptoms of COVID-19. If phone support alone is not sufficient and patient has symptoms, they will call 911 or call ambulance to go to patient.    [facility] Multilingual Crisis Line:  [phone]  COVID-19 UPDATE (3/20/2020): Still open and taking crisis calls but [facility] domestic violence shelter is full at this time.    [place] [facility]: [phone] (Tuesdays and Fridays only from 6-9 pm)  COVID-19 UPDATE (3/20/2020): Need to call back after 6 PM for update    [facility] Adult Crisis:  [phone]  COVID-19 UPDATE (3/20/2020): Still open and taking calls 24/7, no face to face assessments; give recommendations by phone. If phone support alone is not sufficient, they will call 911 or call ambulance to go to patient.     Crisis Text Line: Text [place] to 244697. Free support at your fingertips 24/7  People who text [place] to 617687 will be connected with a counselor. Crisis Text Line is available 24 hours a day, seven days a week.    You can also consider going to the [facility] at the following address.  Walk ins are welcome:    [address] [place] [address]   [phone] (for 24-hour crisis  consultation)    Monday - Friday 8:00am - 7:00pm  Saturday:  11:00am - 3:00pm  Sunday and Holidays Closed    COVID-19 UPDATE (3/20/2020): Hours are: Monday - Friday 7:30am - 5:00 pm  Weekends and holidays closed    If you feel at risk of immediate harm, go directly to the Emergency Department.    09/17/20   MENTAL HEALTH REFERRAL    Mental Health referral routed to behavioral health team for recommendations. See Documentation Only encounter for more information.    Gogo Ornelas    09/29/20   OPHTHALMOLOGY REFERRAL    [facility]  Phone:[phone]  Fax:  [phone]    Faxed demographics and referral to [phone]. They will contact patient to schedule.     Gogo Ornelas

## 2020-09-16 NOTE — NURSING NOTE
Due to patient being non-English speaking/uses sign language, an  was used for this visit. Only for face-to-face interpretation by an external agency, date and length of interpretation can be found on the scanned worksheet.     name: Jus- [id number]  Agency: [employer] - telephone  Language: Nia   Telephone number: [phone]  Type of interpretation: Telemedicine, spoken

## 2020-09-16 NOTE — PROGRESS NOTES
Preceptor attestation:  Vital signs reviewed: /72   Pulse 63   Temp 98  F (36.7  C) (Oral)   Resp 16   Wt 93.9 kg (207 lb)   LMP 09/09/2020 (Approximate)   SpO2 97%   Breastfeeding No   BMI 36.09 kg/m      Patient seen, evaluated, and discussed with the resident.  I have verified the content of the note, which accurately reflects my assessment of the patient and the plan of care.    Supervising physician: Barbara Wright MD  [facility]

## 2020-09-17 ENCOUNTER — DOCUMENTATION ONLY (OUTPATIENT)
Dept: PSYCHOLOGY | Facility: CLINIC | Age: 40
End: 2020-09-17

## 2020-09-17 ASSESSMENT — ANXIETY QUESTIONNAIRES: GAD7 TOTAL SCORE: 9

## 2020-09-17 NOTE — PROGRESS NOTES
Behavioral Health Team,    Patient is being referred for mental health services by their provider, Dr. Olivares.  Please advise if we are able to see patient for in house treatment or if a community option would be best.    Thank you,    Gogo Ornelas  9/17/2020

## 2020-09-17 NOTE — LETTER
September 22, 2020      Moo Say  [address]  [place] [address]        Dear Ed,    This letter is being sent as a reminder that you have an appointment scheduled, for therapy, at [facility]. Your appointment will be a video visit, please, do not come to clinic. Your therapist will send you a text message with the link for the video visit.     Your appointment is Wednesday, October 21st at 1:00pm with Dr. Ashraf via a video visit.     If you have questions or concerns or need to reschedule, please call Kim. I've included my direct number below.      Kindly,    JARAD Gonzalez  Social Work Care Coordinator  Phone: [phone]

## 2020-09-18 NOTE — PROGRESS NOTES
Review of Dr. Olivares's order and note indicates that this is a referral for psychotherapy to treat depressed mood, anxiety and passive SI.  Patient is Nia speaking and would need  for visits.  Per review of the schedule today, Dr. Ashraf may have space to  this patient.  Will ask Kim to reach out to patient to offer this visit.  If Dr. Ashraf's schedule will not work for any reason, we can also offer the following community based resources for therapy.  Patient is scheduled to see Dr. Sloane Thurston for follow up on mood on Wednesday, September 30.  Unfortunately there is no  in [facility] that day, but this may be a good opportunity for social work to discuss treatment options with the patient in clinic if we have difficulty reaching her on the phone.    [facility] [address] [address]  [phone]  COVID-19 UPDATE 03-: [facility] is not doing any face to face visits in their clinics until at least 3/31/2020 and potentially beyond that depending on the current situation. They are not accepting new referrals either. They will continue telehealth visits for those who were already receiving their cares that way. They are not starting new telehealth visits at this time.     [facility], [address]  [place], [place]   [address]  Office:   [phone]  Fax:   [phone]  COVID-19 UPDATE 03-: ACCEPTING NEW REFERRALS FOR ARMHS, THERAPY, ASSESSMENTS & GAMBLING! *NIGHTS & WEEKENDS AVAILABLE.  In order to accommodate everyone's safety during the COVID-19 situation, all services will be provided via TeleHealth.  FOR CLIENT REFERRALS, CONTACT AMELIA YOUSSEF at [phone] or [email or web address].    [facility]  [address] [place] [address]  [phone]  Fax:  [phone]  [email or web address]   There is an online referral form.    Bilingual  therapists:   * Audrey Corcoran, MSW, LGSW:  Fluent in English Sgaw Nia, Uzbek and conversational Omani   * Iva Washburn MA, MSW, LGSW, LADC: English, Mongolian, Reshma and Tibetan   * True [facility], MSW, LICSW:  English, Hmong and conversational Salvadorean  COVID-19 UPDATE 03-: [facility] is still operating as business per usual, at this time. They have telehealth options for people who would rather do that. Clients should call their office to arrange this.    [facility]   [address]  [place] [address]  [phone]    [address]  [place] [address]  [phone]    Services/Specialties:  Hmong Speaking clinicians  Neuropsychological & Psychological Assessments  Outpatient Therapy (Children & Adults)  Group Therapy  Autism Spectrum Disorder  Fetal Alcohol Spectrum Disorder  DBT    Multicultural Psychology / Refugees & Immigrants  COVID-19 UPDATE 03-: Closed from Wednesday, March 18th through Monday, March 27th. Our administrative office will remain open;  however, there will be no in-person appointments available.    [facility] [address]   [place], [place] [address]  [phone]  COVID-19 UPDATE 03-:  [facility] has changed ARHMS visits to telehealth (doing FaceTime or video chat with clients), no in person [facility]MS. Office based visits are still happening as business per usual for the time being. They anticipate this will change in the coming days.      [facility]  [address] [place] [address]  Main Phone: [phone]  Fax: [phone]  [facility] / [phone]  COVID-19 UPDATE 03-: [facility] is closed and not accepting new referrals at this time nor are providing services via telephonically or virtually until April.     If patient will be seen by in-house  provider, will ask our referral team to screen for telehealth barriers at time of follow up  "so that we can identify who may benefit from use of telehealth hub at [facility].  Help for patients who will be scheduled in the community, but need telehealth support will be managed on a case by case basis.    Do you have access to a computer with a webcam or smartphone?   Do you have access to the internet?   Do you have a safe and private space for this conversation?     If the patient answers \"no\" to any of these questions, referral team will engage patient in conversation about whether patient would like to access services via [facility]'s telehealth hub.  This would ensure access while mitigating risk by limiting time in face to face contact with provider (safter if under 15 minutes of exposure in close space, etc.).  If so, this will need to be coordinated and documented on [place] calendar.    Let me know if you have questions or would like additional follow up from me.  Thanks!      Christine Peace, Ph.D.,LP     Disclaimer  The above treatment recommendations are based on consultation with the patient's primary care provider and a review of relevant information in EPIC.? I have not personally examined the patient.? All recommendations should be implemented with considerations of the patient's relevant prior history and current clinical status.  Please contact me with any questions about the care of this patient.  "

## 2020-09-22 NOTE — PROGRESS NOTES
This SW reached out to Ed Say, via [employer] Nia , to discuss MH referral. She is amenable to schedule an appointment with . She does have capacity to due a video visit on her phone but is not confident she will get the steps correctly. She can receive text messages and is willing to try to get the link texted to her phone. If that doesn't work, NEVA assured her a phone visit will be completed.     Ed Say is only available in the afternoons as her children do distanced learning in the morning. 's soonest PM availability is in 1 month. Moo Say agreed to an appointment on Wednesday, October 21st at 1:00pm.     [facility]VA mailed Moo Say a letter with the appointment details, as a reminder, since the appointment is scheduled 1 month out.     [facility] will send to  to request a Nia  for that visit.     JARAD Gonzalez

## 2020-09-29 ENCOUNTER — MEDICAL CORRESPONDENCE (OUTPATIENT)
Dept: HEALTH INFORMATION MANAGEMENT | Facility: CLINIC | Age: 40
End: 2020-09-29

## 2020-09-30 ENCOUNTER — OFFICE VISIT (OUTPATIENT)
Dept: FAMILY MEDICINE | Facility: CLINIC | Age: 40
End: 2020-09-30
Payer: COMMERCIAL

## 2020-09-30 VITALS
SYSTOLIC BLOOD PRESSURE: 115 MMHG | RESPIRATION RATE: 16 BRPM | DIASTOLIC BLOOD PRESSURE: 78 MMHG | WEIGHT: 204.8 LBS | BODY MASS INDEX: 35.71 KG/M2 | HEART RATE: 61 BPM | TEMPERATURE: 97.8 F

## 2020-09-30 DIAGNOSIS — F33.1 MODERATE EPISODE OF RECURRENT MAJOR DEPRESSIVE DISORDER (H): ICD-10-CM

## 2020-09-30 DIAGNOSIS — G89.29 CHRONIC NONINTRACTABLE HEADACHE, UNSPECIFIED HEADACHE TYPE: ICD-10-CM

## 2020-09-30 DIAGNOSIS — R51.9 CHRONIC NONINTRACTABLE HEADACHE, UNSPECIFIED HEADACHE TYPE: ICD-10-CM

## 2020-09-30 RX ORDER — SUMATRIPTAN 50 MG/1
50 TABLET, FILM COATED ORAL
Qty: 60 TABLET | Refills: 3 | Status: SHIPPED | OUTPATIENT
Start: 2020-09-30 | End: 2021-02-24

## 2020-09-30 ASSESSMENT — PATIENT HEALTH QUESTIONNAIRE - PHQ9
SUM OF ALL RESPONSES TO PHQ QUESTIONS 1-9: 11
5. POOR APPETITE OR OVEREATING: NOT AT ALL

## 2020-09-30 ASSESSMENT — ANXIETY QUESTIONNAIRES
6. BECOMING EASILY ANNOYED OR IRRITABLE: SEVERAL DAYS
5. BEING SO RESTLESS THAT IT IS HARD TO SIT STILL: NOT AT ALL
2. NOT BEING ABLE TO STOP OR CONTROL WORRYING: SEVERAL DAYS
7. FEELING AFRAID AS IF SOMETHING AWFUL MIGHT HAPPEN: SEVERAL DAYS
GAD7 TOTAL SCORE: 5
1. FEELING NERVOUS, ANXIOUS, OR ON EDGE: MORE THAN HALF THE DAYS
3. WORRYING TOO MUCH ABOUT DIFFERENT THINGS: NOT AT ALL

## 2020-09-30 NOTE — PROGRESS NOTES
S: Ed Rangel is a 40 year old female with a PMH of   Patient Active Problem List   Diagnosis     Absolute glaucoma, right eye     Blind right eye     Lung granuloma (H)     Microphthalmia with cataract     Migraine     Language barrier, cultural differences     Betel deposit on teeth     Acute pain of right knee     Pulmonary nodules     presenting to clinic today with a chief complaint of f/u mood. Has been taking the sertraline, on 50.   Had started at 25 for one week, now at the full pill 50 mg dosage. Feeling a little bit better. Is taking the sertraline at nighttime. Feeling like she's sleeping too much. I asked her about how much time she's sleeping, and says she doesn't know because she doesn't look at a clock. Also continues to have symptoms of difficulty concentrating and low self-esteem. She does admit that she sometimes will forget to take the pill. PHQ-9 11 today, 11 last visit; KAYLAH-7 5 today, 9 last time. Continues to not have suicidal ideation. Not interested in therapy at this time, because she has her kids and feels like she doesn't have time.      Has been taking medication for the headache- two pills a day. When takes one doesn't help, then takes another and is able to sleep and feel better. Getting the headaches about every day.  Is not interested in a prophylactic medication, because she doesn't want to take more pills every day.       Current Outpatient Medications   Medication Sig Dispense Refill     sertraline (ZOLOFT) 50 MG tablet Take one pill a day 30 tablet 1     SUMAtriptan (IMITREX) 50 MG tablet Take 1 tablet (50 mg) by mouth at onset of headache for migraine May repeat in 2 hours. Max 4 tablets/24 hours. 60 tablet 3     acetaminophen (TYLENOL) 500 MG tablet Take 1-2 tablets (500-1,000 mg) by mouth every 6 hours as needed for mild pain 90 tablet 3     ibuprofen (ADVIL/MOTRIN) 600 MG tablet Take 1 tablet (600 mg) by mouth every 6 hours as needed for moderate pain 90 tablet 0     timolol  maleate (TIMOPTIC) 0.5 % ophthalmic solution Place 1 drop into both eyes 2 times daily (Patient not taking: Reported on 1/29/2020) 5 mL 11       O: /78   Pulse 61   Temp 97.8  F (36.6  C)   Resp 16   Wt 92.9 kg (204 lb 12.8 oz)   LMP 09/09/2020 (Approximate)   BMI 35.71 kg/m     Gen:  Well nourished and in no acute distress. Appears tired.  HEENT: PERRL; TMs normal color and landmarks; nasopharynx pink and moist; oropharynx pink and moist  CV:  RRR  - no murmurs noted   Psych: Flat affect, slow speech  PHQ-9: 11, negative for SI  KAYLAH-7: 5       Assessment and Plan:  Ed was seen today for mood changes.    Diagnoses and all orders for this visit:    Chronic nonintractable headache, unspecified headache type  Discussed adding topiramate for headache prophylaxis; Ed declined and said she doesn't want to have to take more pills every day. We discussed that this is in effort to not have to take the imitrex anymore, and she still declines and says she's happy with her current regimen.   -     SUMAtriptan (IMITREX) 50 MG tablet; Take 1 tablet (50 mg) by mouth at onset of headache for migraine May repeat in 2 hours. Max 4 tablets/24 hours.    Moderate episode of recurrent major depressive disorder (H)  Discussed that at two weeks in we are just starting to see the effects, and that I expect she will continue to have improvement over the next month. No longer interested in therapy. Some issues with forgetting; offered pill box which she declined.   - rtc in 1 month for PHQ-9 and KAYLAH-7  -     sertraline (ZOLOFT) 50 MG tablet; Take one pill a day        This patient was seen and discussed with Dr. Garza who agrees with the assessment and plan.     Nori Thurston MD MD PGY2  [facility]

## 2020-09-30 NOTE — NURSING NOTE
Due to patient being non-English speaking/uses sign language, an  was used for this visit. Only for face-to-face interpretation by an external agency, date and length of interpretation can be found on the scanned worksheet.     name: Therese Juan Carlos  Agency: [employer]  Language: Nia   Telephone number: [phone]  Type of interpretation: Face-to-face, spoken

## 2020-09-30 NOTE — PROGRESS NOTES
Preceptor Attestation:    Patient seen and evaluated in person. I discussed the patient with the resident. I have verified the content of the note, which accurately reflects my assessment of the patient and the plan of care.   Supervising Physician:  Genaro Garza MD.

## 2020-10-01 ASSESSMENT — ANXIETY QUESTIONNAIRES: GAD7 TOTAL SCORE: 5

## 2020-10-14 ENCOUNTER — OFFICE VISIT (OUTPATIENT)
Dept: FAMILY MEDICINE | Facility: CLINIC | Age: 40
End: 2020-10-14
Payer: COMMERCIAL

## 2020-10-14 VITALS
HEART RATE: 61 BPM | DIASTOLIC BLOOD PRESSURE: 77 MMHG | HEIGHT: 64 IN | TEMPERATURE: 97.5 F | OXYGEN SATURATION: 98 % | RESPIRATION RATE: 16 BRPM | WEIGHT: 208.4 LBS | SYSTOLIC BLOOD PRESSURE: 114 MMHG | BODY MASS INDEX: 35.58 KG/M2

## 2020-10-14 DIAGNOSIS — F33.1 MODERATE EPISODE OF RECURRENT MAJOR DEPRESSIVE DISORDER (H): ICD-10-CM

## 2020-10-14 DIAGNOSIS — G43.711 INTRACTABLE CHRONIC MIGRAINE WITHOUT AURA AND WITH STATUS MIGRAINOSUS: Primary | ICD-10-CM

## 2020-10-14 PROCEDURE — 99213 OFFICE O/P EST LOW 20 MIN: CPT | Mod: 25 | Performed by: FAMILY MEDICINE

## 2020-10-14 PROCEDURE — 96372 THER/PROPH/DIAG INJ SC/IM: CPT | Performed by: FAMILY MEDICINE

## 2020-10-14 RX ORDER — KETOROLAC TROMETHAMINE 30 MG/ML
30 INJECTION, SOLUTION INTRAMUSCULAR; INTRAVENOUS ONCE
Status: COMPLETED | OUTPATIENT
Start: 2020-10-14 | End: 2020-10-14

## 2020-10-14 RX ADMIN — KETOROLAC TROMETHAMINE 30 MG: 30 INJECTION, SOLUTION INTRAMUSCULAR; INTRAVENOUS at 09:18

## 2020-10-14 ASSESSMENT — MIFFLIN-ST. JEOR: SCORE: 1586.81

## 2020-10-14 NOTE — PROGRESS NOTES
"S: Ed Rangel is a 40 year old female with a PMH of   Patient Active Problem List   Diagnosis     Absolute glaucoma, right eye     Blind right eye     Lung granuloma (H)     Microphthalmia with cataract     Migraine     Language barrier, cultural differences     Betel deposit on teeth     Acute pain of right knee     Pulmonary nodules     presenting to clinic today with a chief complaint of feeling better but has a headache today.    Has a history of migraines- this current headache has lasted two days.  Feels like one of her regular migraines, but a very severe one. Has been getting a headache every day, so taking the imitrex every day. Has dizziness and nausea currently, and photophobia. Feels like she could throw up but can't. Hadn't gotten a migraine in two weeks prior to this.    Has taken one dose of the imitrex this morning, didn't help. She thinks she took tylenol and ibuprofen as well. Still having the migraine. Started feeling a little more dizzy this morning as well, which her alarmed her and made her want to come to the doctor.       Current Outpatient Medications   Medication Sig Dispense Refill     acetaminophen (TYLENOL) 500 MG tablet Take 1-2 tablets (500-1,000 mg) by mouth every 6 hours as needed for mild pain 90 tablet 3     ibuprofen (ADVIL/MOTRIN) 600 MG tablet Take 1 tablet (600 mg) by mouth every 6 hours as needed for moderate pain 90 tablet 0     sertraline (ZOLOFT) 50 MG tablet Take one pill a day 30 tablet 1     SUMAtriptan (IMITREX) 50 MG tablet Take 1 tablet (50 mg) by mouth at onset of headache for migraine May repeat in 2 hours. Max 4 tablets/24 hours. 60 tablet 3     timolol maleate (TIMOPTIC) 0.5 % ophthalmic solution Place 1 drop into both eyes 2 times daily (Patient not taking: Reported on 1/29/2020) 5 mL 11       O: /77 (BP Location: Left arm, Patient Position: Sitting, Cuff Size: Adult Regular)   Pulse 61   Temp 97.5  F (36.4  C) (Oral)   Resp 16   Ht 1.62 m (5' 3.78\")   Wt " 94.5 kg (208 lb 6.4 oz)   LMP 10/11/2020 (Exact Date)   SpO2 98%   BMI 36.02 kg/m     Gen:  Sitting with her eyes covered, appears to not be feeling well. Intermittently gagging without emesis.  HEENT: PERRL; NCAT. Keeps eyes squinted until lights are turned off  Neck: No nuchal rigidity  CV:  RRR  - no murmurs noted   Pulm:  CTAB, no wheezes or crackles noted, good air entry  Extrem: no cyanosis, edema or clubbing  Psych: Euthymic   Neuro: normal gait. Photophobia.      Assessment and Plan:  Mojennifer was seen today for other, refill request and other.    Diagnoses and all orders for this visit:    Intractable chronic migraine without aura and with status migrainosus  -     ketorolac (TORADOL) injection 30 mg  -     trimethobenzamide (TIGAN) injection 200 mg    If still not better tomorrow, could consider starting a steroid taper. In the past she has not wanted to start prophylactic medications; if she continues to get migraines regularly would recommend again    This patient was seen and discussed with Dr. Chinchilla who agrees with the assessment and plan.     Nori Thurston MD MD PGY2  [facility]

## 2020-10-14 NOTE — NURSING NOTE
Due to patient being non-English speaking/uses sign language, an  was used for this visit. Only for face-to-face interpretation by an external agency, date and length of interpretation can be found on the scanned worksheet.     name: Ludmila  Agency: [employer]  Language: Nia   Telephone number:   Type of interpretation: Telephone, spoken

## 2020-10-14 NOTE — NURSING NOTE
Clinic Administered Medication Documentation      Injectable Medication Documentation    Patient was given Tigan. Prior to medication administration, verified patients identity using patient s name and date of birth. Please see MAR and medication order for additional information. Patient instructed to remain in clinic for 15 minutes.      Was entire vial of medication used? Yes  Vial/Syringe: Single dose vial  Expiration Date:  11/30/2020  Was this medication supplied by the patient? No    Name of provider who requested the medication administration: Dr. Chinchilla  Name of provider on site (faculty or community preceptor) at the time of performing the medication administration: Dr. Chinchilla    Date of next administration: 1 time  Date of next office visit with provider to renew medication plan (must be seen annually): 1 time

## 2020-10-14 NOTE — PROGRESS NOTES
Preceptor Attestation:    Patient seen and evaluated in person. I discussed the patient with the resident. I have verified the content of the note, which accurately reflects my assessment of the patient and the plan of care.   Supervising Physician:  Terri Chinchilla MD.

## 2020-10-14 NOTE — NURSING NOTE
Clinic Administered Medication Documentation      Injectable Medication Documentation    Patient was given Ketorolac Tromethamine (Toradol). Prior to medication administration, verified patients identity using patient s name and date of birth. Please see MAR and medication order for additional information. Patient instructed to remain in clinic for 15 minutes.      Was entire vial of medication used? Yes  Vial/Syringe: Single dose vial  Expiration Date:  09012021  Was this medication supplied by the patient? No    Name of provider who requested the medication administration: Dr. Chinchilla  Name of provider on site (faculty or community preceptor) at the time of performing the medication administration: Dr. Chinchilla    Date of next administration: 1 time  Date of next office visit with provider to renew medication plan (must be seen annually): 1 time

## 2020-10-21 ENCOUNTER — VIRTUAL VISIT (OUTPATIENT)
Dept: PSYCHOLOGY | Facility: CLINIC | Age: 40
End: 2020-10-21
Payer: COMMERCIAL

## 2020-10-21 DIAGNOSIS — F33.1 MODERATE EPISODE OF RECURRENT MAJOR DEPRESSIVE DISORDER (H): ICD-10-CM

## 2020-10-21 DIAGNOSIS — F32.9 CURRENT EPISODE OF MAJOR DEPRESSIVE DISORDER WITHOUT PRIOR EPISODE, UNSPECIFIED DEPRESSION EPISODE SEVERITY: Primary | ICD-10-CM

## 2020-10-21 PROCEDURE — 90785 PSYTX COMPLEX INTERACTIVE: CPT | Mod: U7 | Performed by: STUDENT IN AN ORGANIZED HEALTH CARE EDUCATION/TRAINING PROGRAM

## 2020-10-21 PROCEDURE — 90832 PSYTX W PT 30 MINUTES: CPT | Mod: U7 | Performed by: STUDENT IN AN ORGANIZED HEALTH CARE EDUCATION/TRAINING PROGRAM

## 2020-10-21 NOTE — PROGRESS NOTES
"Behavioral Health Progress Note    Client Legal Name: Moo Say   Client Preferred Name: Moo   Service Type: Individual  Length of Visit: 20 minutes  Attendees: patient, , and Dr. Anton (psychology rotation)      name: [facility]  Agency: [employer] Guardado  Language: Nia   Telephone number: [phone]  Type of interpretation: Telephone, spoken    Complexity statement: Due to patient being non-English speaking/uses sign language, an  was used for this visit.  Date and length of interpretation can be found on the scanned worksheet.  An  is used not only to interpret language, since the patient does not speak English, but also to help with the complexity of understandings across cultures, since the patient is not well integrated in the larger [place] culture.      The following statement was read to the patient at the outset of this visit:     \"We have found that certain health care needs can be provided without the need for a face to face visit.  This service lets us provide the care you need with a phone conversation. I will have full access to your [facility] medical record during this entire phone call.   I will be taking notes for your medical record.  Since this is like an office visit, we will bill your insurance company for this service. There are potential benefits and risks of telephone visits (e.g. limits to patient confidentiality) that differ from in-person visits.?  Confidentiality still applies for telephone services, and nobody will record the visit.  It is important to be in a quiet, private space that is free of distractions (including cell phone or other devices) during the visit.??If during the course of the call I believe a telephone visit is not appropriate, you will not be charged for this service.\"     Consent has been obtained for this service by care team member: Yes     Emergency contact: None identified at this time  Closest Emergency Room: [facility]  Location at time of call: Home    Start of call: 1:10 pm   End of call: 1:30 pm    Identifying Information and Presenting Problem:    The patient is a 40 year old Nia female who is being seen for problematic symptoms of depression and anxiety.    Treatment Objective(s) Addressed in This Session:  First visit - Informed consent, rapport building, and information gathering    Progress on / Status of Treatment Objective(s) / Homework:  N/A - first visit w/patient    PHQ-9 SCORE 8/1/2019 9/16/2020 9/30/2020   PHQ-9 Total Score 8 11 11       KAYLAH-7 SCORE 9/16/2020 9/30/2020   Total Score 9 5       Topics Discussed/Interventions Provided:    This was my first visit with the patient. Rights to and limits to confidentiality were discussed with patient. Integrated care team and shared chart were discussed with patient and agreed upon. Role as post-doctoral fellow and supervision were discussed with patient.    Patient reported that she is doing okay. She did not report any mood symptoms. She did not remember being referred to therapy. Said she is taking her sertraline medication but ran out a few days ago. Dr. Jimenez was present at the visit and refilled medication. She has recently met with the patient and encouraged her to schedule a follow-up.    Patient ended appointment early because she had to give the phone to one of her children for school. Said she lives with her  and 5 kids. Said she has an appointment for her children on Friday with Dr. Anton and can reschedule then if needed.      Assessment: The patient appeared to be active and engaged in today's session and was receptive to feedback.     Mental Status: Moo sounded generally alert and oriented. Speech was of normal volume and rate and was clear, coherent, and relevant. Mood was okay with congruent affect. Thought processes were relevant, logical and goal-directed. Thought content was WNL with no evidence of psychotic  or paranoid features. No evidence of SI/HI or self-harm, intent, or plans. Memory appeared grossly intact. Insight and judgment appeared good and patient exhibited good impulse control during the appointment.     Does the patient appear to be at imminent risk of harm to self/others at this time? No    The session was necessary to address problematic symptoms of depression and anxiety that have been interfering with patient's ability to function at home.  Dr. Anton shared that patient had declined therapy services on visit 9/30/2020 with her, although a referral had been placed and scheduled already. Ongoing psychotherapy would be helpful to stabilize mood and improve functioning with daily activities. It is unclear if patient is interested in psychotherapy and Dr. Anton will converse with patient about this when patient returns to clinic for children's appointment.    Diagnosis (DSM-5):  Depression disorder, unspecified    Plan:  1. Reschedule appointment if patient is interested. Dr. Anton will discuss with patient on Friday.   2. Encouraged patient to schedule follow up with PCP or Dr. Anton.   3. Utilize crisis resources as needed.      NOTE: Treatment plan and diagnostic assessment update when feasible.    Diamond Ashraf, PhD.  Behavioral Health Fellow

## 2020-10-22 NOTE — PROGRESS NOTES
I have reviewed and agree with the behavioral health fellow's documentation for this visit.  I did not personally see the patient.  Christine Peace, PhD., LP

## 2021-02-24 ENCOUNTER — OFFICE VISIT (OUTPATIENT)
Dept: FAMILY MEDICINE | Facility: CLINIC | Age: 41
End: 2021-02-24
Payer: COMMERCIAL

## 2021-02-24 VITALS
SYSTOLIC BLOOD PRESSURE: 117 MMHG | RESPIRATION RATE: 12 BRPM | WEIGHT: 207 LBS | TEMPERATURE: 98.1 F | BODY MASS INDEX: 36.68 KG/M2 | DIASTOLIC BLOOD PRESSURE: 79 MMHG | OXYGEN SATURATION: 98 % | HEART RATE: 62 BPM | HEIGHT: 63 IN

## 2021-02-24 DIAGNOSIS — G89.29 CHRONIC NONINTRACTABLE HEADACHE, UNSPECIFIED HEADACHE TYPE: ICD-10-CM

## 2021-02-24 DIAGNOSIS — F33.1 MODERATE EPISODE OF RECURRENT MAJOR DEPRESSIVE DISORDER (H): ICD-10-CM

## 2021-02-24 DIAGNOSIS — R51.9 CHRONIC NONINTRACTABLE HEADACHE, UNSPECIFIED HEADACHE TYPE: ICD-10-CM

## 2021-02-24 DIAGNOSIS — Z11.59 ENCOUNTER FOR HEPATITIS C SCREENING TEST FOR LOW RISK PATIENT: Primary | ICD-10-CM

## 2021-02-24 DIAGNOSIS — G43.909 MIGRAINE WITHOUT STATUS MIGRAINOSUS, NOT INTRACTABLE, UNSPECIFIED MIGRAINE TYPE: ICD-10-CM

## 2021-02-24 DIAGNOSIS — R74.8 ELEVATED LIVER ENZYMES: ICD-10-CM

## 2021-02-24 LAB
ALBUMIN SERPL-MCNC: 4.7 MG/DL (ref 3.9–5.1)
ALP SERPL-CCNC: 69.8 U/L (ref 40–150)
ALT SERPL-CCNC: 78.9 U/L (ref 0–45)
AST SERPL-CCNC: 38.6 U/L (ref 0–45)
BILIRUB SERPL-MCNC: 0.7 MG/DL (ref 0.2–1.3)
BILIRUBIN DIRECT: 0.3 MG/DL (ref 0–0.2)
PROT SERPL-MCNC: 8.5 G/DL (ref 6.8–8.8)

## 2021-02-24 PROCEDURE — 36415 COLL VENOUS BLD VENIPUNCTURE: CPT | Performed by: STUDENT IN AN ORGANIZED HEALTH CARE EDUCATION/TRAINING PROGRAM

## 2021-02-24 PROCEDURE — 99214 OFFICE O/P EST MOD 30 MIN: CPT | Mod: GC | Performed by: STUDENT IN AN ORGANIZED HEALTH CARE EDUCATION/TRAINING PROGRAM

## 2021-02-24 PROCEDURE — 80076 HEPATIC FUNCTION PANEL: CPT | Performed by: STUDENT IN AN ORGANIZED HEALTH CARE EDUCATION/TRAINING PROGRAM

## 2021-02-24 RX ORDER — SUMATRIPTAN 50 MG/1
50 TABLET, FILM COATED ORAL
Qty: 18 TABLET | Refills: 0 | Status: SHIPPED | OUTPATIENT
Start: 2021-02-24 | End: 2021-03-23

## 2021-02-24 RX ORDER — ONDANSETRON 4 MG/1
4 TABLET, ORALLY DISINTEGRATING ORAL ONCE
Status: COMPLETED | OUTPATIENT
Start: 2021-02-24 | End: 2021-02-24

## 2021-02-24 RX ORDER — ACETAMINOPHEN 500 MG
500-1000 TABLET ORAL EVERY 6 HOURS PRN
Qty: 90 TABLET | Refills: 3 | Status: SHIPPED | OUTPATIENT
Start: 2021-02-24 | End: 2022-03-02

## 2021-02-24 RX ORDER — KETOROLAC TROMETHAMINE 30 MG/ML
30 INJECTION, SOLUTION INTRAMUSCULAR; INTRAVENOUS ONCE
Status: DISCONTINUED | OUTPATIENT
Start: 2021-02-24 | End: 2021-03-17

## 2021-02-24 RX ADMIN — ONDANSETRON 4 MG: 4 TABLET, ORALLY DISINTEGRATING ORAL at 11:14

## 2021-02-24 ASSESSMENT — COLUMBIA-SUICIDE SEVERITY RATING SCALE - C-SSRS
2. IN THE PAST MONTH, HAVE YOU ACTUALLY HAD ANY THOUGHTS OF KILLING YOURSELF?: NO
6. HAVE YOU EVER DONE ANYTHING, STARTED TO DO ANYTHING, OR PREPARED TO DO ANYTHING TO END YOUR LIFE?: NO
1. WITHIN THE PAST MONTH, HAVE YOU WISHED YOU WERE DEAD OR WISHED YOU COULD GO TO SLEEP AND NOT WAKE UP?: YES

## 2021-02-24 ASSESSMENT — MIFFLIN-ST. JEOR: SCORE: 1572.04

## 2021-02-24 ASSESSMENT — PATIENT HEALTH QUESTIONNAIRE - PHQ9: SUM OF ALL RESPONSES TO PHQ QUESTIONS 1-9: 6

## 2021-02-24 NOTE — PROGRESS NOTES
Preceptor Attestation:    I discussed the patient with the resident and evaluated the patient in person. I have verified the content of the note, which accurately reflects my assessment of the patient and the plan of care.   Supervising Physician:  Genaro Garza MD.

## 2021-02-24 NOTE — PATIENT INSTRUCTIONS
- Medication refill sent to the pharmacy  - Take 1 pill of sumatriptan at a time and can repeat in 2 hours if needed   - Don't take ibuprofen for at least 8 hours today  - Can take acetaminophen 1-2 tablets every 8 hours as needed  - Referral to neurology placed today  - Follow up in a few weeks     If you have thoughts about self harm or if you just need additional support and care, here are some resources for you:    Crisis Lines:    [facility] Adult Crisis:  [phone]   COVID-19 UPDATE (3/20/2020): Still open and taking calls 24/7, limiting some in-person visits if patient has symptoms of COVID-19. If phone support alone is not sufficient and patient has symptoms, they will call 911 or call ambulance to go to patient.    [facility] Multilingual Crisis Line:  [phone]  COVID-19 UPDATE (3/20/2020): Still open and taking crisis calls but [facility] domestic violence shelter is full at this time.    [facility] Helpline: [phone] (Tuesdays and Fridays only from 6-9 pm)  COVID-19 UPDATE (3/20/2020): Need to call back after 6 PM for update    [facility] Adult Crisis:  [phone]  COVID-19 UPDATE (3/20/2020): Still open and taking calls 24/7, no face to face assessments; give recommendations by phone. If phone support alone is not sufficient, they will call 911 or call ambulance to go to patient.     Crisis Text Line: Text [place] to 655916. Free support at your fingertips 24/7  People who text [place] to [phone] will be connected with a counselor. Crisis Text Line is available 24 hours a day, seven days a week.    You can also consider going to the [facility] at the following address.  Walk ins are welcome:    [address] [place] [address], [place]   [phone] (for 24-hour crisis consultation)    Monday - Friday 8:00am - 7:00pm  Saturday:  11:00am - 3:00pm  Sunday and Holidays Closed    COVID-19 UPDATE (3/20/2020): Hours are: Monday - Friday 7:30am - 5:00 pm  Weekends and holidays  closed    If you feel at risk of immediate harm, go directly to the Emergency Department.      02/25/21   NEUROLOGY ADULT REFERRAL   [facility]  Phone: [phone]  Fax: [phone]  Fax:[phone] (business fax/alternative)    Referral, demographics, office note and medication list faxed to [phone].     Lindsay Rodriguez CMA    03/23/21 ADDENDUM  NEUROLOGY ADULT REFERRAL   [facility]  Phone: [phone]  Fax: [phone]    [address]  [address] [facility] [address]    Appointment: Monday April 5th  Arrival Time: 9:30am   Provider: Dr. Castañeda

## 2021-02-24 NOTE — Clinical Note
Coding review routed note back to me as Toradol not documented in the MAR? Was this administered? If so, do you still have that information.Thanks!

## 2021-02-24 NOTE — NURSING NOTE
Due to patient being non-English speaking/uses sign language, an  was used for this visit. Only for face-to-face interpretation by an external agency, date and length of interpretation can be found on the scanned worksheet.     name: Rohan Madrigal  Agency: [employer]  Language: Nia   Telephone number: [phone]  Type of interpretation: Telephone, spoken

## 2021-02-24 NOTE — PROGRESS NOTES
Assessment & Plan     Chronic nonintractable headache, unspecified headache type  Patient with chronic headaches with current intractable headache. Has declined prophylactic medications in the past and again today, but is taking sumatriptan too often. Unclear what medication she ran out of as she wasn't able to describe and didn't have the pill bottles with her today. Will refill acetaminophen and sumatriptan, but discussed the importance of only using sumatriptan for severe headaches and described directions for use again today. Unable to complete full neuro exam due to language barrier and patient difficulty understanding directions with phone . Does have vision deficits that are known 2/2 glaucoma and cataract. Referral placed to neurology today. Did administer Toradol and Zofran in clinic due to intractable headache.   - SUMAtriptan (IMITREX) 50 MG tablet; Take 1 tablet (50 mg) by mouth at onset of headache for migraine May repeat in 2 hours. Max 4 tablets/24 hours.  - ketorolac (TORADOL) injection 30 mg  - NEUROLOGY ADULT REFERRAL; Future  - ondansetron (ZOFRAN-ODT) ODT tab 4 mg  - acetaminophen (TYLENOL) 500 MG tablet; Take 1-2 tablets (500-1,000 mg) by mouth every 6 hours as needed for mild pain    Moderate episode of recurrent major depressive disorder (H)  Refilled today. Patient with chronic intermittent passive suicidal ideation, mostly related to chronic pain. Declined dose adjustment of sertraline today or referral back to behavioral health. Contracts for safety. Crisis resources discussed today.  - sertraline (ZOLOFT) 50 MG tablet; Take one pill a day    Encounter for hepatitis C screening test for low risk patient  Due for one time screening test. Patient accepted.   - Hepatitis C Antibody ([place])    Elevated liver enzymes  History of elevated transaminases during pregnancy. Will recheck today to see if persistent. If still present, consider RUQ ultrasound for evaluation of  possible hepatic steatosis.   - Hepatic Panel ([place])    Chronic right knee pain  History of chronic patellar fracture with knee pain today. Declined referral back to physical therapy or orthopedics at this time. Will continue to monitor.       Review of prior external note(s) from - Outside records from [place]       Depression Screening Follow Up    PHQ 2/24/2021   PHQ-9 Total Score 6   Q9: Thoughts of better off dead/self-harm past 2 weeks Several days         C-SSRS ([place]) 2/24/2021   Within the last month, have you wished you were dead or wished you could go to sleep and not wake up? Yes   Within the last month, have you had any actual thoughts of killing yourself? No   Within the last month, have you ever done anything, started to do anything, or prepared to do anything to end your life? No       Follow Up        Follow Up Actions Taken  Crisis resource information provided in the After Visit Summary  Patient to follow up with PCP.  Clinic staff to schedule appointment if able.  Patient declined referral.    Discussed the following ways the patient can remain in a safe environment:  remove things I could use to hurt myself: medications and be around others    Return in about 3 weeks (around 3/17/2021) for Follow up, with me for headaches.    Azul Olivares MD  [facility]    Mae Ward is a 40 year old who presents for the following health issues: headaches, knee pain     HPI   6}    Headache  Onset/Duration: has been having worsening headaches over the last week  Description  Location: unilateral but varies as to which side and then radiates to the whole head  Character: sharp pain  Frequency:  At least a few times per week, sometimes daily  Duration:  Not sure how long they last  Wake with headaches: no  Able to do daily activities when headache present: no   Intensity:  severe  Progression of Symptoms:  worsening  Accompanying signs and symptoms:  Stiff neck:  no  Neck or upper back pain: no  Sinus or URI symptoms no   Fever: no  Nausea or vomiting: YES, occasionally  Dizziness: YES  Numbness/tingling: no  Weakness: YES, sometimes, but has a hard time describing   Visual changes: Does get eye pain with headaches   History  Head trauma: Unsure  Family history of migraines: no  Daily pain medication use: Sometimes   Previous tests for headaches: no  Neurologist evaluation: no  Precipitating or Alleviating factors (light/sound/sleep/caffeine): She is not sure  Therapies tried and outcome: Taking 2 pills at a time, but only when having headaches. She is not sure what medication was called or how often she was taking this.          Outcome - effective  Frequent/daily pain medication use: Taking 1-2 times per week on average, sometimes daily    She is not interested in starting a daily medication to help with headaches. She would be interested in seeing a specialist though for further discussion of headaches.     Right knee pain  Onset/Duration: Has been going on for a long time. History of chronic patellar fracture that with acute pain that worsened a few years ago after a fall  Description  Location: knee - right  Joint Swelling: no  Redness: no  Pain: no  Warmth: no  Intensity:  moderate  Progression of Symptoms:  same  Accompanying signs and symptoms:   Fevers: no  Numbness/tingling/weakness: no  History  Trauma to the area: YES- history of patellar dislocation   Recent illness:  no  Previous similar problem: YES  Previous evaluation:  YES  Precipitating or alleviating factors:  Aggravating factors include: walking  Therapies tried and outcome: Tried acetaminophen with some relief. Was able to do physical therapy.     Depression Followup    How are you doing with your depression since your last visit? Worsened with her headaches. She is still taking the medication (sertaline). She is not interested in increasing her dose of sertraline. Did have a visit with Dr. Ashraf  "but didn't schedule follow up. She is not interested in scheduling another visit.     Are you having other symptoms that might be associated with depression? Yes. Notes she is sleeping too much and feels tired    Have you had a significant life event?  No     Are you feeling anxious or having panic attacks?   No    Do you have any concerns with your use of alcohol or other drugs? No     Endorsing passive suicidal ideation today that is chronic and intermittent. Notes that she feels this way when her pain is not well-controlled. Has never developed a plan and feels that her children are a supportive factor for her.     Social History     Tobacco Use     Smoking status: Never Smoker     Smokeless tobacco: Current User     Tobacco comment: Chews betel nut up to 2-3 x's per week   Substance Use Topics     Alcohol use: No     Drug use: No     PHQ 9/16/2020 9/30/2020 2/24/2021   PHQ-9 Total Score 11 11 6   Q9: Thoughts of better off dead/self-harm past 2 weeks Not at all Not at all Several days     KAYLAH-7 SCORE 9/16/2020 9/30/2020   Total Score 9 5     Last PHQ-9 2/24/2021   1.  Little interest or pleasure in doing things 0   2.  Feeling down, depressed, or hopeless 0   3.  Trouble falling or staying asleep, or sleeping too much 3   4.  Feeling tired or having little energy 1   5.  Poor appetite or overeating 0   6.  Feeling bad about yourself 0   7.  Trouble concentrating 1   8.  Moving slowly or restless 0   Q9: Thoughts of better off dead/self-harm past 2 weeks 1   PHQ-9 Total Score 6   Difficulty at work, home, or with people -         Review of Systems   See HPI       Objective    /79 (BP Location: Left arm, Patient Position: Sitting, Cuff Size: Adult Large)   Pulse 62   Temp 98.1  F (36.7  C) (Oral)   Resp 12   Ht 1.607 m (5' 3.25\")   Wt 93.9 kg (207 lb)   SpO2 98%   BMI 36.38 kg/m    Body mass index is 36.38 kg/m .  Physical Exam   GENERAL: healthy, alert and no distress  NECK: no adenopathy, no " asymmetry, masses, or scars and thyroid normal to palpation  RESP: lungs clear to auscultation - no rales, rhonchi or wheezes  CV: regular rate and rhythm, normal S1 S2, no S3 or S4, no murmur, click or rub, no peripheral edema and peripheral pulses strong  ABDOMEN: soft, nontender, no hepatosplenomegaly, no masses and bowel sounds normal  MS: no gross musculoskeletal defects noted. Right knee TTP over patella. No significant edema.   NEURO: Difficult to perform neuro exam due to language barrier with phone . Strength diminished on the right   PSYCH: mentation appears normal, affect normal/bright

## 2021-02-24 NOTE — NURSING NOTE
Clinic Administered Medication Documentation    Oral Medication Documentation    Patient was given Ondansetron (Zofran) . Prior to medication administration, verified patients identity using patient s name and date of birth. Please see MAR and medication order for additional information.     Was entire amount of medication used? Yes  Expiration Date: 2/2022

## 2021-02-25 DIAGNOSIS — R74.8 ELEVATED LIVER ENZYMES: Primary | ICD-10-CM

## 2021-02-25 LAB — HCV AB SER QL: NEGATIVE

## 2021-02-25 NOTE — RESULT ENCOUNTER NOTE
Please call patient with results. Ultrasound order placed. May need assistance with scheduling. Thanks!    Dear Ed Say,    Your screening test for hepatitis C was negative. Your liver enzymes are still a little elevated. We should check an ultrasound to take a closer look at your liver.     Sincerely,     Dr. Azul Olivares

## 2021-03-17 NOTE — PROGRESS NOTES
Dr. Olivares,    I give her the one time Zofran in clinic, but I don't recall giving her the shot. I probably miss that when pt was here. [place]

## 2021-03-23 ENCOUNTER — OFFICE VISIT (OUTPATIENT)
Dept: FAMILY MEDICINE | Facility: CLINIC | Age: 41
End: 2021-03-23
Payer: COMMERCIAL

## 2021-03-23 VITALS
BODY MASS INDEX: 36.8 KG/M2 | RESPIRATION RATE: 16 BRPM | TEMPERATURE: 97.8 F | OXYGEN SATURATION: 96 % | DIASTOLIC BLOOD PRESSURE: 80 MMHG | SYSTOLIC BLOOD PRESSURE: 120 MMHG | WEIGHT: 209.4 LBS | HEART RATE: 61 BPM

## 2021-03-23 DIAGNOSIS — G89.29 CHRONIC NONINTRACTABLE HEADACHE, UNSPECIFIED HEADACHE TYPE: ICD-10-CM

## 2021-03-23 DIAGNOSIS — R51.9 CHRONIC NONINTRACTABLE HEADACHE, UNSPECIFIED HEADACHE TYPE: ICD-10-CM

## 2021-03-23 DIAGNOSIS — R74.8 ELEVATED LIVER ENZYMES: Primary | ICD-10-CM

## 2021-03-23 DIAGNOSIS — F33.1 MODERATE EPISODE OF RECURRENT MAJOR DEPRESSIVE DISORDER (H): ICD-10-CM

## 2021-03-23 PROBLEM — F32.A DEPRESSION: Status: ACTIVE | Noted: 2021-03-23

## 2021-03-23 PROCEDURE — 99214 OFFICE O/P EST MOD 30 MIN: CPT | Mod: GC | Performed by: STUDENT IN AN ORGANIZED HEALTH CARE EDUCATION/TRAINING PROGRAM

## 2021-03-23 RX ORDER — SUMATRIPTAN 50 MG/1
50 TABLET, FILM COATED ORAL
Qty: 18 TABLET | Refills: 0 | Status: SHIPPED | OUTPATIENT
Start: 2021-03-23 | End: 2021-07-30

## 2021-03-23 ASSESSMENT — PATIENT HEALTH QUESTIONNAIRE - PHQ9: SUM OF ALL RESPONSES TO PHQ QUESTIONS 1-9: 10

## 2021-03-23 NOTE — PROGRESS NOTES
Assessment & Plan     Elevated liver enzymes  Patient with history of mildly elevated ALT for the past few years. Denies alcohol use. Hepatitis B immune and negative screening for hepatitis C. Will order RUQ ultrasound for further evaluation. Suspect possible hepatic steatosis given elevated BMI.   - US ABDOMEN LIMITED; Future    Chronic nonintractable headache, unspecified headache type  Continues to have chronic headaches/migraines that are improved with sumatriptan. Again discussed dosing and option for prophylactic medication to try and prevent frequency of headaches. Patient accepted neurology referral at last visit, but hasn't been scheduled. Will assist with this.   - SUMAtriptan (IMITREX) 50 MG tablet; Take 1 tablet (50 mg) by mouth at onset of headache for migraine May repeat in 2 hours. Max 4 tablets/24 hours.    Moderate episode of recurrent major depressive disorder  No SI or HI. PHQ-9 = 10 today. Taking sertraline 50 mg daily, but forgets several times per week. Discussed tools to try and remember medications. Offered referral for psychotherapy again and patient prefers to wait at this time.       Depression Screening Follow Up    PHQ 3/23/2021   PHQ-9 Total Score 10   Q9: Thoughts of better off dead/self-harm past 2 weeks Not at all         Follow Up Actions Taken  Referred patient back to PCP  Patient declined referral.         Follow up in 4 weeks       Patient discussed with Dr. Genaro Garza who agrees with the above assessment and plan.     Azul Olivares MD, PGY3  [facility]        Subjective   Moo is a 41 year old who presents for the following health issues: mood, abnormal liver enzymes and headaches   accompanied by her phone :    HPI     Headaches: She notes that her headaches do seem to get better with medication. She is using the medication that comes in package (sumatriptan). She is using this 1-2x/week. She does take two tablets at a time, because 1 tablet  "doesn't seem to work very well. She has headaches most days of the week.     Elevated liver enzymes: Chronically elevated. She has not had a right upper quadrant ultrasound in the past. Hepatitis B antibody is positive. Hepatitis C antibody negative. She is not having any abdominal pain. No alcohol use.      Mood: She notes that this has been about the same, but that her headaches are less. She notes that she continues to have lots of stress and that makes things feel more \"off\". She is taking sertraline, but does forget to take her medications for 2-3 days straight sometimes. She does take with food. She doesn't know how to set up a timer in her phone and isn't sure a pill box would help. She endorses no suicidal or homicidal ideation today. She is not sure about counseling as she notes that she just doesn't like to talk much.     Review of Systems   See HPI      Objective    /80 (BP Location: Left arm, Patient Position: Sitting, Cuff Size: Adult Large)   Pulse 61   Temp 97.8  F (36.6  C) (Oral)   Resp 16   Wt 95 kg (209 lb 6.4 oz)   SpO2 96%   BMI 36.80 kg/m    Body mass index is 36.8 kg/m .  Physical Exam   GENERAL: healthy, alert and no distress  RESP: lungs clear to auscultation - no rales, rhonchi or wheezes  CV: regular rate and rhythm, normal S1 S2, no S3 or S4, no murmur, click or rub, no peripheral edema and peripheral pulses strong  ABDOMEN: soft, nontender, no hepatosplenomegaly, no masses and bowel sounds normal  MS: no gross musculoskeletal defects noted, no edema  PSYCH: mentation appears normal, affect somewhat blunted.    Office Visit on 02/24/2021   Component Date Value Ref Range Status     Albumin 02/24/2021 4.7  3.9 - 5.1 mg/dL Final     Alkaline Phosphatase 02/24/2021 69.8  40.0 - 150.0 U/L Final     ALT 02/24/2021 78.9* 0.0 - 45.0 U/L Final     AST 02/24/2021 38.6  0.0 - 45.0 U/L Final     Bilirubin Direct 02/24/2021 0.3* 0.0 - 0.2 mg/dL Final     Bilirubin Total 02/24/2021 0.7  0.2 " - 1.3 mg/dL Final     Protein Total 02/24/2021 8.5  6.8 - 8.8 g/dL Final     Hepatitis C Antibody Screen 02/24/2021 Negative  Negative Final

## 2021-03-23 NOTE — NURSING NOTE
Due to patient being non-English speaking/uses sign language, an  was used for this visit. Only for face-to-face interpretation by an external agency, date and length of interpretation can be found on the scanned worksheet.     name: Kristi Trejo  Agency: [employer]  Language: Nia   Telephone number:   Type of interpretation: Group, spoken; number of participants: 2

## 2021-03-23 NOTE — PATIENT INSTRUCTIONS
NEUROLOGY ADULT REFERRAL   [facility] [place]  Phone: [phone]  Fax: [phone]    [address]  [address]   [facility] [address]    Appointment: Monday April 5th  Arrival Time: 9:30am   Provider: Dr. Garry Guzman [phone]   time: 8:30am   Return Ride: Call [phone]  Confirmation #: [id number]

## 2021-04-05 ENCOUNTER — TELEPHONE (OUTPATIENT)
Dept: FAMILY MEDICINE | Facility: CLINIC | Age: 41
End: 2021-04-05

## 2021-04-05 ENCOUNTER — RECORDS - HEALTHEAST (OUTPATIENT)
Dept: ADMINISTRATIVE | Facility: OTHER | Age: 41
End: 2021-04-05

## 2021-04-05 ENCOUNTER — RECORDS - HEALTHEAST (OUTPATIENT)
Dept: NEUROLOGY | Facility: CLINIC | Age: 41
End: 2021-04-05

## 2021-04-05 ENCOUNTER — OFFICE VISIT (OUTPATIENT)
Dept: NEUROLOGY | Facility: CLINIC | Age: 41
End: 2021-04-05
Attending: FAMILY MEDICINE
Payer: COMMERCIAL

## 2021-04-05 VITALS
DIASTOLIC BLOOD PRESSURE: 73 MMHG | BODY MASS INDEX: 36.68 KG/M2 | HEART RATE: 63 BPM | SYSTOLIC BLOOD PRESSURE: 120 MMHG | WEIGHT: 207 LBS | HEIGHT: 63 IN

## 2021-04-05 DIAGNOSIS — R51.9 CHRONIC NONINTRACTABLE HEADACHE, UNSPECIFIED HEADACHE TYPE: ICD-10-CM

## 2021-04-05 DIAGNOSIS — R29.898 LEFT ARM WEAKNESS: ICD-10-CM

## 2021-04-05 DIAGNOSIS — G43.009 MIGRAINE WITHOUT AURA AND WITHOUT STATUS MIGRAINOSUS, NOT INTRACTABLE: ICD-10-CM

## 2021-04-05 DIAGNOSIS — G89.29 CHRONIC NONINTRACTABLE HEADACHE, UNSPECIFIED HEADACHE TYPE: ICD-10-CM

## 2021-04-05 DIAGNOSIS — G43.009 MIGRAINE WITHOUT AURA AND WITHOUT STATUS MIGRAINOSUS, NOT INTRACTABLE: Primary | ICD-10-CM

## 2021-04-05 PROCEDURE — 99204 OFFICE O/P NEW MOD 45 MIN: CPT | Performed by: PSYCHIATRY & NEUROLOGY

## 2021-04-05 ASSESSMENT — MIFFLIN-ST. JEOR: SCORE: 1567.04

## 2021-04-05 NOTE — LETTER
4/5/2021         RE: Ed Say  [address]  [place] [address]        Dear Colleague,    Thank you for referring your patient, Ed Say, to the [facility] [place]. Please see a copy of my visit note below.    [facility]  [place]    Ed Say MRN# [id number]   Age: 41 year old YOB: 1980               Assessment and Plan:   Assessment:   Headaches -- the patient is satisfied with her current regimen and is not interested in prophylactic medication for migraines.  She does not know her dose of sumatriptan, if the dose is 50 mg this could be increased to 100 mg as she does tend to use 2 tablets.    Left arm weakness -- this has a give-way quality.  If MRI of the brain does not show a clear right hemisphere lesion then the weakness is likely non-organic.        Plan:   Orders Placed This Encounter   Procedures     MR Brain w/o & w Contrast                Chief Complaint/HPI:     I saw this patient for neurologic evaluation today.  She is seen with the help of an .  This is a 41-year-old woman with history of migraine headaches.  She gets 1 or 2/week and takes sumatriptan hand with good relief of the headaches.  She is happy with that, she says that when she takes the medicine she does not have any headache.  I asked specifically about daily headache but she denied that.  She does complain of some difficulty walking, there is some right knee pain which may be contributing to that.            Past Medical History:    has a past medical history of Decreased vision of right eye, Migraine, and Miscarriage (11/18/2016).          Past Surgical History:    has a past surgical history that includes no history of surgery.          Social History:     Social History     Tobacco Use     Smoking status: Never Smoker     Smokeless tobacco: Current User     Tobacco comment: Chews betel nut up to 2-3 x's per week   Substance Use Topics     Alcohol  "use: No             Family History:     Family History   Problem Relation Age of Onset     No Known Problems Mother      No Known Problems Father      No Known Problems Maternal Grandmother      No Known Problems Maternal Grandfather      No Known Problems Paternal Grandmother      No Known Problems Paternal Grandfather      No Known Problems Brother      No Known Problems Sister      No Known Problems Son      No Known Problems Daughter      No Known Problems Maternal Half-Brother      No Known Problems Maternal Half-Sister      No Known Problems Paternal Half-Brother      No Known Problems Paternal Half-Sister      No Known Problems Niece      No Known Problems Nephew      No Known Problems Cousin      No Known Problems Other                 Allergies:   No Known Allergies          Medications:     Current Outpatient Medications:      acetaminophen (TYLENOL) 500 MG tablet, Take 1-2 tablets (500-1,000 mg) by mouth every 6 hours as needed for mild pain, Disp: 90 tablet, Rfl: 3     ibuprofen (ADVIL/MOTRIN) 600 MG tablet, Take 1 tablet (600 mg) by mouth every 6 hours as needed for moderate pain, Disp: 90 tablet, Rfl: 0     sertraline (ZOLOFT) 50 MG tablet, Take one pill a day, Disp: 90 tablet, Rfl: 1     SUMAtriptan (IMITREX) 50 MG tablet, Take 1 tablet (50 mg) by mouth at onset of headache for migraine May repeat in 2 hours. Max 4 tablets/24 hours., Disp: 18 tablet, Rfl: 0     timolol maleate (TIMOPTIC) 0.5 % ophthalmic solution, Place 1 drop into both eyes 2 times daily (Patient not taking: Reported on 1/29/2020), Disp: 5 mL, Rfl: 11              Physical Exam:     /73 (BP Location: Right arm, Patient Position: Sitting)   Pulse 63   Ht 1.607 m (5' 3.25\")   Wt 93.9 kg (207 lb)   BMI 36.38 kg/m     Awake, alert, no aphasia, no dysarthria  Oriented x3, attention is fine  Cranial nerves II - XII tested and intact except for decreased right palpebral fissure, no nystagmus  There is giveaway weakness at the left " wrist extensors and right ankle dorsiflexors  Finger nose finger testing is normal  Rapid alternating movements are normal on both sides  Tone is normal on both sides  Sensation is normal  Gait is normal        [place] clinic notes reviewed through [facility] everywhere  [facility] records reviewed as well  No brain imaging has been done as far as I can see.      Chintan Prince MD            Again, thank you for allowing me to participate in the care of your patient.        Sincerely,        Chintan Prince MD

## 2021-04-05 NOTE — TELEPHONE ENCOUNTER
"Social Work Note:    Length of Call: 26 minutes    Data and Intervention: During well child check for son Behzad Vásquez last week, patient expressed food insecurity. Used [employer]  to call. Ed states that her kids are now receiving \"food cards\" and that this is really helpful and they are doing okay now.  She is not sure if this is through the school, or if it is through SNAP, because she cannot read the letter because it is in English.     During previous clinic visit, Dr. Florez gave them flyer for [employer] [place] supplemental food shelf program. This is located 2 doors down from this clinic on [address]. States that her  drove to this clinic to go to the food shelf, but could not find the door and returned home after wandering around for a while.     SW described and offered [employer] program with [employer] food shares, as well as [employer] program for meal kits. Stated that they got a weekly food delivery before, through [employer] and did not know how to use much of the [employer] foods and is not interested in this again.     Assessment and Plan: SW offered to walk with family to [employer] when they are here in clinic next. Mom will ask for this SW during next visit for any of her children.     Azul Pryor, MINDA    "

## 2021-04-05 NOTE — NURSING NOTE
Chief Complaint   Patient presents with     Headache     Allie Pappas CMA on 4/5/2021 at 9:32 AM

## 2021-04-05 NOTE — PROGRESS NOTES
[facility]  [place]    Ed Say MRN# [id number]   Age: 41 year old YOB: 1980               Assessment and Plan:   Assessment:   Headaches -- the patient is satisfied with her current regimen and is not interested in prophylactic medication for migraines.  She does not know her dose of sumatriptan, if the dose is 50 mg this could be increased to 100 mg as she does tend to use 2 tablets.    Left arm weakness -- this has a give-way quality.  If MRI of the brain does not show a clear right hemisphere lesion then the weakness is likely non-organic.        Plan:   Orders Placed This Encounter   Procedures     MR Brain w/o & w Contrast                Chief Complaint/HPI:     I saw this patient for neurologic evaluation today.  She is seen with the help of an .  This is a 41-year-old woman with history of migraine headaches.  She gets 1 or 2/week and takes sumatriptan hand with good relief of the headaches.  She is happy with that, she says that when she takes the medicine she does not have any headache.  I asked specifically about daily headache but she denied that.  She does complain of some difficulty walking, there is some right knee pain which may be contributing to that.            Past Medical History:    has a past medical history of Decreased vision of right eye, Migraine, and Miscarriage (11/18/2016).          Past Surgical History:    has a past surgical history that includes no history of surgery.          Social History:     Social History     Tobacco Use     Smoking status: Never Smoker     Smokeless tobacco: Current User     Tobacco comment: Chews betel nut up to 2-3 x's per week   Substance Use Topics     Alcohol use: No             Family History:     Family History   Problem Relation Age of Onset     No Known Problems Mother      No Known Problems Father      No Known Problems Maternal Grandmother      No Known Problems Maternal Grandfather      No Known Problems  "Paternal Grandmother      No Known Problems Paternal Grandfather      No Known Problems Brother      No Known Problems Sister      No Known Problems Son      No Known Problems Daughter      No Known Problems Maternal Half-Brother      No Known Problems Maternal Half-Sister      No Known Problems Paternal Half-Brother      No Known Problems Paternal Half-Sister      No Known Problems Niece      No Known Problems Nephew      No Known Problems Cousin      No Known Problems Other                 Allergies:   No Known Allergies          Medications:     Current Outpatient Medications:      acetaminophen (TYLENOL) 500 MG tablet, Take 1-2 tablets (500-1,000 mg) by mouth every 6 hours as needed for mild pain, Disp: 90 tablet, Rfl: 3     ibuprofen (ADVIL/MOTRIN) 600 MG tablet, Take 1 tablet (600 mg) by mouth every 6 hours as needed for moderate pain, Disp: 90 tablet, Rfl: 0     sertraline (ZOLOFT) 50 MG tablet, Take one pill a day, Disp: 90 tablet, Rfl: 1     SUMAtriptan (IMITREX) 50 MG tablet, Take 1 tablet (50 mg) by mouth at onset of headache for migraine May repeat in 2 hours. Max 4 tablets/24 hours., Disp: 18 tablet, Rfl: 0     timolol maleate (TIMOPTIC) 0.5 % ophthalmic solution, Place 1 drop into both eyes 2 times daily (Patient not taking: Reported on 1/29/2020), Disp: 5 mL, Rfl: 11              Physical Exam:     /73 (BP Location: Right arm, Patient Position: Sitting)   Pulse 63   Ht 1.607 m (5' 3.25\")   Wt 93.9 kg (207 lb)   BMI 36.38 kg/m     Awake, alert, no aphasia, no dysarthria  Oriented x3, attention is fine  Cranial nerves II - XII tested and intact except for decreased right palpebral fissure, no nystagmus  There is giveaway weakness at the left wrist extensors and right ankle dorsiflexors  Finger nose finger testing is normal  Rapid alternating movements are normal on both sides  Tone is normal on both sides  Sensation is normal  Gait is normal        [place] clinic notes reviewed through care " everywhere  [facility] records reviewed as well  No brain imaging has been done as far as I can see.      Chintan Prince MD

## 2021-04-21 ENCOUNTER — OFFICE VISIT (OUTPATIENT)
Dept: FAMILY MEDICINE | Facility: CLINIC | Age: 41
End: 2021-04-21
Payer: COMMERCIAL

## 2021-04-21 VITALS
OXYGEN SATURATION: 95 % | HEART RATE: 60 BPM | SYSTOLIC BLOOD PRESSURE: 117 MMHG | WEIGHT: 206.4 LBS | RESPIRATION RATE: 16 BRPM | DIASTOLIC BLOOD PRESSURE: 77 MMHG | BODY MASS INDEX: 36.27 KG/M2 | TEMPERATURE: 97.9 F

## 2021-04-21 DIAGNOSIS — G43.009 MIGRAINE WITHOUT AURA AND WITHOUT STATUS MIGRAINOSUS, NOT INTRACTABLE: Primary | ICD-10-CM

## 2021-04-21 DIAGNOSIS — R74.8 ELEVATED LIVER ENZYMES: ICD-10-CM

## 2021-04-21 DIAGNOSIS — F33.1 MODERATE EPISODE OF RECURRENT MAJOR DEPRESSIVE DISORDER (H): ICD-10-CM

## 2021-04-21 PROCEDURE — 99214 OFFICE O/P EST MOD 30 MIN: CPT | Mod: GC | Performed by: STUDENT IN AN ORGANIZED HEALTH CARE EDUCATION/TRAINING PROGRAM

## 2021-04-21 ASSESSMENT — PATIENT HEALTH QUESTIONNAIRE - PHQ9: SUM OF ALL RESPONSES TO PHQ QUESTIONS 1-9: 4

## 2021-04-21 NOTE — Clinical Note
Can you help get patient rescheduled for MRI with transportation to that appt and RUQ US here (doesn't need transportation to clinic)? Thanks!

## 2021-04-21 NOTE — NURSING NOTE
Due to patient being non-English speaking/uses sign language, an  was used for this visit. Only for face-to-face interpretation by an external agency, date and length of interpretation can be found on the scanned worksheet.     name: Kristi Trejo   Agency: [employer]  Language: Nia   Telephone number: [phone]  Type of interpretation: Telephone, spoken

## 2021-04-21 NOTE — PROGRESS NOTES
Assessment & Plan     Migraine without aura and without status migrainosus, not intractable  Migraines improved from last visit with no headaches in the last week. Patient saw neurology and MRI ordered, but patient missed appointment as she wasn't sure where to go. Will route to referral coordinator to see if they can assist with rescheduling appointment and with transportation.     Moderate episode of recurrent major depressive disorder (H)  Mood well controlled on current regimen. Plan to continue sertraline 50 mg daily with no medication adjustments today.    Elevated liver enzymes  History of chronically elevated mild transaminitis. Likely mild CHAMPAGNE in the setting of obesity as labs for hepatitis negative and she doesn't drink alcohol. RUQ US was ordered, but patient missed appointment. Will route to referral coordinator to see if they can assist with rescheduling.       Review of prior external note(s) from - Neurology      Follow up in 1 month.       Patient discussed with Dr. Genaro Garza who agrees with the above assessment and plan.     Azul Olivares MD, PGY3  [facility]        Subjective   Ed is a 41 year old who presents for the following health issues: headache and mood    HPI       Headaches: Patient was able to have a visit with neurology. She was supposed to have an MRI, but missed this appointment as her  wasn't sure how to get to the appointment. She notes that she actually hasn't had any headaches recently. It has been over a week since her last headache. She notes that she last took her rescue medicine over a week ago. She takes 1 tablet of the rescue medication and then takes another tablet if it doesn't help. She is not sure what the medication is called, but notes that it comes in a pill pack. She has only been taking 1-2x week when she is getting regular headaches.     Elevated liver enzymes: History of mild transaminitis. Negative hepatitis screening.  She is immune  to hepatitis B. She has not had any abdominal pain, nausea or vomiting.      Mood: PHQ-9 score is 4 today and patient reports that her mood is good. She is taking sertraline 50 mg daily,but sometimes forgets to take this medication. Notes that she feels well when she doesn't have headaches or other pain. When she feels worse, her mood is worse.     Glaucoma: Patient saw the eye doctor, but she isn't sure when this appointment was. She currently doesn't have any eye drops and was only using these as needed before. She can't remember what the clinic was called.    Review of Systems   See HPI      Objective    /77 (BP Location: Left arm, Patient Position: Sitting, Cuff Size: Adult Regular)   Pulse 60   Temp 97.9  F (36.6  C) (Oral)   Resp 16   Wt 93.6 kg (206 lb 6.4 oz)   SpO2 95%   BMI 36.27 kg/m    Body mass index is 36.27 kg/m .  Physical Exam   GENERAL: healthy, alert and no distress  RESP: lungs clear to auscultation - no rales, rhonchi or wheezes  CV: regular rate and rhythm, normal S1 S2, no S3 or S4, no murmur, click or rub, no peripheral edema and peripheral pulses strong  ABDOMEN: soft, nontender, no masses and bowel sounds normal  MS: no gross musculoskeletal defects noted, no edema

## 2021-04-27 NOTE — PATIENT INSTRUCTIONS
21   ABDOMEN LMT   Appointment: Monday May 10th  Time: 10:15am   Provider: Magaly Byrd  Location: [facility]    PREP INSTRUCTIONS: NPO 8 hours    21   MRI Brain   [facility]   [phone]  Fax Orders to [phone]    [address] [address]    Appointment:  Tuesday May 11th  Arrival Time:  10:15am    Selwyn Wray [phone]   time: 9:15am   Return Ride:  Call [phone] to be picked up.

## 2021-05-10 ENCOUNTER — ANCILLARY PROCEDURE (OUTPATIENT)
Dept: ULTRASOUND IMAGING | Facility: CLINIC | Age: 41
End: 2021-05-10
Attending: FAMILY MEDICINE
Payer: COMMERCIAL

## 2021-05-10 NOTE — NURSING NOTE
Due to patient being non-English speaking/uses sign language, an  was used for this visit. Only for face-to-face interpretation by an external agency, date and length of interpretation can be found on the scanned worksheet.     name: Elvin #KEML  Agency: [employer] - telephone  Language: Nia   Telephone number: [phone]  Type of interpretation: Telephone, spoken

## 2021-05-18 PROBLEM — K82.4 GALLBLADDER POLYP: Status: ACTIVE | Noted: 2021-05-18

## 2021-05-18 PROBLEM — K76.0 HEPATIC STEATOSIS: Status: ACTIVE | Noted: 2021-05-18

## 2021-05-18 PROBLEM — K80.20 CHOLELITHIASIS: Status: ACTIVE | Noted: 2021-05-18

## 2021-05-28 NOTE — PROGRESS NOTES
"Please see \"Imaging\" tab under \"Chart Review\" for details of today's visit.    Britni De La Paz        "

## 2021-05-28 NOTE — NURSING NOTE
"Nia  #[id number] used via Ipad for today's visit. Pt presents for US, states she has had abdominal pain that started a month ago. Describes pain as \"pressure\", pt attributes to \"gas pain\" but unable to have bowel movement this morning. Pt also states \"this occurs with all of my pregnancies.\" Plan to proceed with US, notify Dr. De La Paz of pt report.  "

## 2021-05-29 NOTE — ANESTHESIA PROCEDURE NOTES
Epidural Block    Patient location during procedure: OB  Time Called: 6/19/2019 3:33 PM  Reason for Block:labor epidural  Staffing:  Performing  Anesthesiologist: Genaro Fisher MD  Preanesthetic Checklist  Completed: patient identified, risks, benefits, and alternatives discussed, timeout performed, consent obtained, at patient's request, airway assessed, oxygen available, suction available, emergency drugs available and hand hygiene performed  Procedure  Patient position: right lateral decubitus  Prep: Betadine  Patient monitoring: continuous pulse oximetry, blood pressure and heart rate  Approach: midline  Location: L2-L3  Injection technique: ANN saline  Number of Attempts:2  Needle  Needle type: Mindlikesedgar   Needle gauge: 18 G     Catheter in Space: 4  Assessment  Sensory level: T8  No complications

## 2021-05-29 NOTE — PROGRESS NOTES
"Ultrasound report with recommendations and images available under \"imaging\" tab in EPIC.     Prakash Jack MD  Maternal Fetal Medicine    "

## 2021-05-29 NOTE — ANESTHESIA POSTPROCEDURE EVALUATION
Patient: Ed Rangel  * No procedures listed *  Anesthesia type: epidural    Patient location: PACU  Last vitals:   Vitals Value Taken Time   /65 6/19/2019  8:21 PM   Temp  6/19/2019  8:33 PM   Pulse 89 6/19/2019  8:30 PM   Resp  6/19/2019  8:33 PM   SpO2 96 % 6/19/2019  8:30 PM   Vitals shown include unvalidated device data.  Post vital signs: stable  Level of consciousness: awake  Post-anesthesia pain: pain controlled  Post-anesthesia nausea and vomiting: no  Pulmonary: unassisted, return to baseline  Cardiovascular: stable and blood pressure at baseline  Hydration: adequate  Anesthetic events: no    QCDR Measures:  ASA# 11 - Karina-op Cardiac Arrest: ASA11B - Patient did NOT experience unanticipated cardiac arrest  ASA# 12 - Karina-op Mortality Rate: ASA12B - Patient did NOT die  ASA# 13 - PACU Re-Intubation Rate: NA - No ETT / LMA used for case  ASA# 10 - Composite Anes Safety: ASA10A - No serious adverse event    Additional Notes:

## 2021-05-29 NOTE — ANESTHESIA PREPROCEDURE EVALUATION
Anesthesia Evaluation      Patient summary reviewed   No history of anesthetic complications     Airway   Mallampati: I  Neck ROM: full   Pulmonary - negative ROS and normal exam                          Cardiovascular - negative ROS and normal exam  Exercise tolerance: > or = 4 METS  (-) murmur  Rhythm: regular  Rate: normal,    no murmur      Neuro/Psych - negative ROS     Endo/Other    (+) obesity, pregnant     GI/Hepatic/Renal - negative ROS           Dental - normal exam                        Anesthesia Plan  Planned anesthetic: epidural    ASA 2         Post-op plan: routine recovery

## 2021-06-01 ENCOUNTER — DOCUMENTATION ONLY (OUTPATIENT)
Dept: FAMILY MEDICINE | Facility: CLINIC | Age: 41
End: 2021-06-01

## 2021-06-01 NOTE — PROGRESS NOTES
Patient no showed for office visit to follow up on results.     Routed to MA to assist with scheduling.      Azul Olivares MD, PGY3

## 2021-06-04 ENCOUNTER — IMMUNIZATION (OUTPATIENT)
Dept: FAMILY MEDICINE | Facility: CLINIC | Age: 41
End: 2021-06-04
Payer: COMMERCIAL

## 2021-06-04 ENCOUNTER — OFFICE VISIT (OUTPATIENT)
Dept: FAMILY MEDICINE | Facility: CLINIC | Age: 41
End: 2021-06-04
Payer: COMMERCIAL

## 2021-06-04 VITALS
DIASTOLIC BLOOD PRESSURE: 72 MMHG | TEMPERATURE: 98.1 F | HEART RATE: 63 BPM | SYSTOLIC BLOOD PRESSURE: 106 MMHG | OXYGEN SATURATION: 97 % | RESPIRATION RATE: 16 BRPM

## 2021-06-04 DIAGNOSIS — G43.009 MIGRAINE WITHOUT AURA AND WITHOUT STATUS MIGRAINOSUS, NOT INTRACTABLE: ICD-10-CM

## 2021-06-04 DIAGNOSIS — J84.10 LUNG GRANULOMA (H): ICD-10-CM

## 2021-06-04 DIAGNOSIS — F33.1 MODERATE EPISODE OF RECURRENT MAJOR DEPRESSIVE DISORDER (H): ICD-10-CM

## 2021-06-04 DIAGNOSIS — K76.0 HEPATIC STEATOSIS: ICD-10-CM

## 2021-06-04 DIAGNOSIS — K80.20 CALCULUS OF GALLBLADDER WITHOUT CHOLECYSTITIS WITHOUT OBSTRUCTION: ICD-10-CM

## 2021-06-04 DIAGNOSIS — K82.4 GALLBLADDER POLYP: Primary | ICD-10-CM

## 2021-06-04 DIAGNOSIS — R91.8 PULMONARY NODULES: ICD-10-CM

## 2021-06-04 PROCEDURE — 91301 PR COVID VAC MODERNA 100 MCG/0.5 ML IM: CPT

## 2021-06-04 PROCEDURE — 99214 OFFICE O/P EST MOD 30 MIN: CPT | Mod: 25 | Performed by: STUDENT IN AN ORGANIZED HEALTH CARE EDUCATION/TRAINING PROGRAM

## 2021-06-04 PROCEDURE — 0011A PR COVID VAC MODERNA 100 MCG/0.5 ML IM: CPT

## 2021-06-04 NOTE — PROGRESS NOTES
Assessment & Plan     Gallbladder polyp  Calculus of gallbladder without cholecystitis without obstruction  Noted to have likely gallbladder polyps on right upper quadrant ultrasound completed due to chronically elevated transaminases.  Also found to have cholelithiasis with intermittent symptoms described today.  Offered referral to general surgery to discuss possible cholecystectomy given questionable gallbladder polyp even though these are usually benign cannot rule out malignancy and for gallstones but patient prefers to wait.  Recommend recheck ultrasound in 6 months.    Hepatic steatosis  Noted to have hepatic steatosis on imaging this is likely contributing to her chronically elevated transaminases.  Hepatitis B immune and hepatitis C negative.  Denies any alcohol use.  Obesity likely contributing factor to hepatic steatosis.  Will need to continue to monitor.    Left arm weakness  Noted on exam when evaluated by neurology.  MRI was previously ordered but unfortunately patient's ride did not show up for the appointment.  Will route to referral coordinator to assist with helping reschedule.    Moderate episode of recurrent major depressive disorder (H)  Mood currently well controlled on 50 mg sertraline per day.  Patient not interested in making any medication adjustments today.    Migraine without aura and without status migrainosus, not intractable  Migraines currently well controlled with as needed sumatriptan that she is using 1-2 times per week.  Continue current management with no changes    Pulmonary nodules  Lung granuloma  Noted on prior chest x-rays before moving to the [place].  Did have negative sputum's in 2016.  Noted on recent imaging in 2020 as well.  Cannot see that CT has ever been completed.  Recommended low-dose chest CT to further evaluate need for follow-up.  QuantiFERON gold was negative on arrival to the [place] as well.  Order placed for low-dose chest disease today.  - CT Chest Low Dose Non  "Contrast; Future    Review of prior external note(s) from - [facility]where information from [facility] reviewed  }     BMI:   Estimated body mass index is 36.27 kg/m  as calculated from the following:    Height as of 4/5/21: 1.607 m (5' 3.25\").    Weight as of 4/21/21: 93.6 kg (206 lb 6.4 oz).   Weight management plan: Discussed healthy diet and exercise guidelines        Patient discussed with Dr. Beatriz Cee who agrees with the above assessment and plan.     Azul Olivares MD, PGY3  [facility] Medicine      Subjective   Moo is a 41 year old who presents for the following health issues: imaging results    HPI     Neurology consult:  MRI brain previously recommended due to some left arm weakness with give way quality. Unfortunately, patient's ride failed to show up for her last appointment and so this has not yet been able to be completed.    RUQ results:  Noted to have hepatic steatosis and likely gallbladder polyp and cholelithiasis. She notes that she does occasionally get some abdominal discomfort after eating, but is not sure how often this occurs. She notes that this pain is underneath the ribs, sometimes a little bit more on the left side actually and does occasionally go into her back. This pain feels like stomach isn't digesting properly. The pain is sometimes sharp in nature and resolves fairly quickly.  Doesn't drink any alcohol.     Mood: She notes that sometimes is not as good, but currently feels okay. She is taking sertraline 50 mg daily.     Migraines: She notes that these have been much better recently with medication. She is taking 1-2x/week and this is very helpful for her. She is not interested in making any medication changes today.       Review of Systems   See HPI         Objective    /72 (BP Location: Left arm, Patient Position: Sitting, Cuff Size: Adult Large)   Pulse 63   Temp 98.1  F (36.7  C) (Oral)   Resp 16   LMP 05/08/2021 (Exact Date)   SpO2 97%   Breastfeeding No "   There is no height or weight on file to calculate BMI.  Physical Exam   GENERAL: healthy, alert and no distress  RESP: lungs clear to auscultation - no rales, rhonchi or wheezes  CV: regular rate and rhythm, normal S1 S2, no S3 or S4, no murmur, click or rub, no peripheral edema and peripheral pulses strong  ABDOMEN: soft, nontender, no hepatosplenomegaly, no masses and bowel sounds normal  MS: no gross musculoskeletal defects noted, no edema

## 2021-06-04 NOTE — NURSING NOTE
Due to patient being non-English speaking/uses sign language, an  was used for this visit. Only for face-to-face interpretation by an external agency, date and length of interpretation can be found on the scanned worksheet.     name: KIM  Agency: [employer]  Language: Nia   Telephone number: [phone]  Type of interpretation: Telephone, spoken

## 2021-06-04 NOTE — PATIENT INSTRUCTIONS
- CT chest and MRI brain ordered  - We will help get you scheduled for follow up    - Repeat ultrasound of liver in 6 months    - Work on diet and exercise     21   ORDER: CT CHEST   [facility]   [phone]  Fax Orders to [phone]     Order faxed to [phone], they will contact patient to schedule.     Gogo Ornelas

## 2021-06-04 NOTE — PROGRESS NOTES
Preceptor Attestation:  I discussed the patient with the resident and evaluated the patient in person. I have verified the content of the note, which accurately reflects my assessment of the patient and the plan of care.  Supervising Physician:  Puja Cee MD.

## 2021-06-04 NOTE — Clinical Note
Would you be able to assist patient with MRI brain and chest CT scheduling? Her ride didn't show up last time. Thanks!

## 2021-06-14 ENCOUNTER — RECORDS - HEALTHEAST (OUTPATIENT)
Dept: SCHEDULING | Facility: CLINIC | Age: 41
End: 2021-06-14

## 2021-06-14 ENCOUNTER — RECORDS - HEALTHEAST (OUTPATIENT)
Dept: ADMINISTRATIVE | Facility: OTHER | Age: 41
End: 2021-06-14

## 2021-06-14 DIAGNOSIS — R91.8 PULMONARY NODULES: ICD-10-CM

## 2021-06-22 NOTE — PROGRESS NOTES
"[facility] [place]  Genetic Counseling Consult    Patient: Ed Rangel YOB: 1980   Date of Service: 12/10/2018      Ed Rangel was seen at [facility] with a Nia  for genetic consultation to discuss the options for screening and testing for fetal chromosome abnormalities.  The indication for genetic counseling is advanced maternal age.        Impression/Plan:   1.  Ed had an ultrasound and blood draw for NIPT (Innatal test through [employer]).  Results are expected within 7-10 days, and will be available in [employer].  We will contact her to discuss the results, and a copy will be forwarded to the office of the referring OB provider. Ed requested that I call her cell phone with Nia . She does not understand \"voicemail\" and so does not want results left in a message. She is requesting that I forward results to her primary OB clinic.     2.  Maternal serum AFP (single marker screen) is recommended after 15 weeks to screen for open neural tube defects. A quad screen should not be performed.    3.  An 18-20 week comprehensive ultrasound is standard of care for all women 35 or older at delivery.    Pregnancy History:   /Parity:    Age at Delivery: 39 y.o.  HARJIT: 2019, by Ultrasound  Gestational Age: 12w4d    No significant complications or exposures were reported in the current pregnancy.    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    Family History:   A three-generation pedigree was obtained, and is scanned under the  Media  tab.   The reported family history is negative for multiple miscarriages, stillbirths, birth defects, mental " retardation, known genetic conditions, and consanguinity.       Carrier Screening:   The patient reports that she and the father of the pregnancy have  ancestry:      The hemoglobinopathies are a group of genetic blood diseases that occur with increased frequency in individuals of  ancestry and carrier screening for these conditions is available.  Carrier screening for the hemoglobinopathies includes a CBC with red blood cell indices, a ferritin level, and a quantitative hemoglobin electrophoresis or HPLC.  In addition,  screening in the [facility] includes many of the hemoglobinopathies.    Expanded carrier screening for mutations in a large panel of genes associated with autosomal recessive conditions including cystic fibrosis, spinal muscular atrophy, and others, is now available.    The patient has declined the carrier screening options reviewed today.       Risk Assessment for Chromosome Conditions:   We explained that the risk for fetal chromosome abnormalities increases with maternal age. We discussed specific features of common chromosome abnormalities, including Down syndrome, trisomy 13, trisomy 18, and sex chromosome trisomies.      At age 39 at delivery, the midtrimester risk to have a baby with Down syndrome is 1 in 56.     At age 39 at delivery, the midtrimester risk to have a baby with any chromosome abnormality is 1 in 31.        Testing Options:   We discussed the following options:  First trimester screening    First trimester ultrasound with nuchal translucency and nasal bone assessments, maternal plasma hCG, BRUCE-A, and AFP measurement    Screens for fetal trisomy 21, trisomy 13, and trisomy 18    Cannot screen for open neural tube defects; maternal serum AFP after 15 weeks is recommended    Non-invasive Prenatal Testing (NIPT)    Maternal plasma cell-free DNA testing; first trimester ultrasound with nuchal translucency and nasal bone assessment is recommended, when  appropriate    Screens for fetal trisomy 21, trisomy 13, trisomy 18, and sex chromosome aneuploidy    Cannot screen for open neural tube defects; maternal serum AFP after 15 weeks is recommended    Chorionic villus sampling (CVS)    Invasive procedure typically performed in the first trimester by which placental villi are obtained for the purpose of chromosome analysis and/or other prenatal genetic analysis    Diagnostic results; >99% sensitivity for fetal chromosome abnormalities    Cannot test for open neural tube defects; maternal serum AFP after 15 weeks is recommended    Genetic Amniocentesis    Invasive procedure typically performed in the second trimester by which amniotic fluid is obtained for the purpose of chromosome analysis and/or other prenatal genetic analysis    Diagnostic results; >99% sensitivity for fetal chromosome abnormalities    AFAFP measurement tests for open neural tube defects    Comprehensive (Level II) ultrasound: Detailed ultrasound performed between 18-22 weeks gestation to screen for major birth defects and markers for aneuploidy.      We reviewed the benefits and limitations of this testing.  Screening tests provide a risk assessment specific to the pregnancy for certain fetal chromosome abnormalities, but cannot definitively diagnose or exclude a fetal chromosome abnormality.  Follow-up genetic counseling and consideration of diagnostic testing is recommended with any abnormal screening result. Diagnostic tests carry inherent risks- including risk of miscarriage- that require careful consideration.  These tests can detect fetal chromosome abnormalities with greater than 99% certainty.  Results can be compromised by maternal cell contamination or mosaicism, and are limited by the resolution of cytogenetic G-banding technology.  There is no screening nor diagnostic test that can detect all forms of birth defects or mental disability.     It was a pleasure to be involved with Ed jamil  care. Face-to-face time of the meeting was 40 minutes.    Diamond Oro MS, [facility]  Maternal Fetal Medicine  [facility]  Phone:[phone]  Phone: [phone]  Email: [email or web address]

## 2021-06-22 NOTE — PROGRESS NOTES
"Please see \"Imaging\" tab under Chart Review for full report.  This ultrasound was performed in the [facility], and may be located under Care Everywhere.    Aure Rock MD  Maternal Fetal Medicine    "

## 2021-06-24 NOTE — PROGRESS NOTES
"Please see \"Imaging\" tab under Chart Review for full report.  This ultrasound was performed in the [facility], and may be located under Care Everywhere.    Aure Rock MD  Maternal Fetal Medicine    "

## 2021-07-01 DIAGNOSIS — N92.6 IRREGULAR PERIODS: Primary | ICD-10-CM

## 2021-07-02 ENCOUNTER — OFFICE VISIT (OUTPATIENT)
Dept: FAMILY MEDICINE | Facility: CLINIC | Age: 41
End: 2021-07-02
Payer: COMMERCIAL

## 2021-07-02 ENCOUNTER — IMMUNIZATION (OUTPATIENT)
Dept: FAMILY MEDICINE | Facility: CLINIC | Age: 41
End: 2021-07-02
Attending: FAMILY MEDICINE
Payer: COMMERCIAL

## 2021-07-02 VITALS
TEMPERATURE: 98.1 F | HEART RATE: 54 BPM | SYSTOLIC BLOOD PRESSURE: 121 MMHG | OXYGEN SATURATION: 100 % | RESPIRATION RATE: 16 BRPM | DIASTOLIC BLOOD PRESSURE: 81 MMHG

## 2021-07-02 DIAGNOSIS — O03.9 COMPLETE MISCARRIAGE: Primary | ICD-10-CM

## 2021-07-02 LAB
B-HCG SERPL-ACNC: 210 MLU/ML (ref 0–4)
HEMOGLOBIN: 12.7 G/DL (ref 11.7–15.7)

## 2021-07-02 PROCEDURE — 99213 OFFICE O/P EST LOW 20 MIN: CPT | Mod: GC | Performed by: STUDENT IN AN ORGANIZED HEALTH CARE EDUCATION/TRAINING PROGRAM

## 2021-07-02 PROCEDURE — 85018 HEMOGLOBIN: CPT | Performed by: STUDENT IN AN ORGANIZED HEALTH CARE EDUCATION/TRAINING PROGRAM

## 2021-07-02 PROCEDURE — 36415 COLL VENOUS BLD VENIPUNCTURE: CPT | Performed by: STUDENT IN AN ORGANIZED HEALTH CARE EDUCATION/TRAINING PROGRAM

## 2021-07-02 PROCEDURE — 0012A PR COVID VAC MODERNA 100 MCG/0.5 ML IM: CPT

## 2021-07-02 PROCEDURE — 91301 PR COVID VAC MODERNA 100 MCG/0.5 ML IM: CPT

## 2021-07-02 RX ORDER — IBUPROFEN 400 MG/1
400 TABLET, FILM COATED ORAL EVERY 6 HOURS PRN
Qty: 100 TABLET | Refills: 3 | Status: SHIPPED | OUTPATIENT
Start: 2021-07-02 | End: 2022-03-02

## 2021-07-02 NOTE — PROGRESS NOTES
Assessment & Plan     Complete miscarriage  Faina Rangel is a 41-year-old  presenting with 10-day history of increased vaginal bleeding after missing a menstrual period concerning for complete miscarriage.  Patient's LMP was about the first week of May, making her about 6 or 7 weeks pregnant before this presumed miscarriage.  Her bleeding has improved and she is now only bleeding a small amount.  Her symptoms related to the bleeding have also improved, aside from some abdominal tenderness which she is treating with a heating pad.  She has no concerning symptoms currently including lightheadedness or dizziness, fevers or chills, or purulent discharge.  Will check quantitative beta-hCG level today for confirmation of the miscarriage.  She may need a recheck of the beta hCG level next week depending on the level today.  We will also check her hemoglobin level given the amount of bleeding that she had.  Ibuprofen was provided for symptom relief.  Recommended follow-up for any new symptoms and for a complete physical exam with her PCP.  Will call patient with results and determine if follow-up testing is needed.  - Beta-HCG Quantitative ([facility])  - Hemoglobin (HGB) ([facility])  - ibuprofen (ADVIL/MOTRIN) 400 MG tablet  Dispense: 100 tablet; Refill: 3    Addendum 21  Beta-HCG returned low at 210, which is low normal for 4-5 weeks gestation and lower than expected for what her likely gestation is currently, which is consistent with complete miscarriage. Will have her return in 5 days to recheck beta-HCG and ensure it is down-trending.     Return if symptoms worsen or fail to improve.    Patient was discussed with attending physician, Dr. Robert Lee MD, who agrees with the assessment and plan.    Aure Boykin MD, PGY-2  [facility]  2021      Subjective   Ed Rangel is a 41 year old  female who presents for the following health issues     Irregular periods and UPT    She  reports that her last normal period in the first week of May. She was due for a period around late May or early June but she did not have a period. She started having a period about 10 days ago. At first, it was heavier than normal with larger clots than normal. The clots were about the size of her toe or thumb but were not as big as a golf ball. She needed to use cloth due to the heavy bleeding. No other differences that she has noticed compared to a normal period except for the increased bleeding. She is still having bleeding, but it has decreased significantly. This does feel similar to a miscarriage she had in [facility]. She also had another incidence of heavy period in the past which she is not sure if it was a miscarriage or not. She is not sure if she could have been pregnant or not. She is not using any birth control. Her periods usually come every month and are regular. She did feel some nausea, lightheadedness, and dizziness with the increased bleeding, but has not felt these symptoms for several days. She is still having some aching in her lower abdomen and lower back, but that has also improved. She is using a heat pack, which is helpful for the pain. She would like some medication to help with the ache. She is scheduled for her second COVID vaccine today.     Her last child was born in 2019.    Review of Systems   10-point ROS negative other than listed above.       Objective    Vitals:    07/02/21 1013   BP: 121/81   BP Location: Left arm   Patient Position: Sitting   Cuff Size: Adult Large   Pulse: 54   Resp: 16   Temp: 98.1  F (36.7  C)   TempSrc: Oral   SpO2: 100%     There is no height or weight on file to calculate BMI.  Physical Exam   General: alert, appears comfortable, no acute distress  HEENT: atraumatic, conjunctiva clear without erythema, EOM's intact, no nasal discharge, MMM  Neck: supple  Cardiac: normal rate and rhythm with no murmurs or extra sounds  Resp: lungs clear to auscultation  bilaterally with no crackles or wheezes, no increased work of breathing  Abdomen: soft, tender in suprapubic area without significantly enlarged palpable uterus, no rebound or guarding, no masses  Extremities: no peripheral edema  Skin: no rashes or suspicious legions on exposed skin  Neuro: CN's grossly intact  Psych: affect congruent with mood

## 2021-07-02 NOTE — PATIENT INSTRUCTIONS
Plan:  1. It was a pleasure seeing you in clinic today.  2. Unfortunately, I think your heavy bleeding was a miscarriage. We will check blood work today to confirm. I will call you with the results.     Please schedule a clinic appointment for a physical exam.     [facility]  Phone: [phone]  Address: [address] [place] [address]

## 2021-07-02 NOTE — NURSING NOTE
Due to patient being non-English speaking/uses sign language, an  was used for this visit. Only for face-to-face interpretation by an external agency, date and length of interpretation can be found on the scanned worksheet.       name:  Mau Verduzco  Agency: [employer]  Language: Nia  Telephone number:   [phone]  Type of interpretation:  Face-to-face, Spoken

## 2021-07-02 NOTE — PROGRESS NOTES
Preceptor Attestation:    I discussed the patient with the resident and evaluated the patient in person. I have verified the content of the note, which accurately reflects my assessment of the patient and the plan of care.  Agree that this is most likely a completed miscarriage, given hx and quant HCG of 210.  Agree with repeating HCG next week, for diagnostic certainty.   Supervising Physician:  Robert Lee MD.

## 2021-07-02 NOTE — RESULT ENCOUNTER NOTE
Patient called via patient care staff and notified of low beta-HCG level and normal Hgb. Will have patient schedule lab-only appointment next week to recheck beta-HCG level and ensure that it is down-trending. MICHELLE.

## 2021-07-07 DIAGNOSIS — O03.9 COMPLETE MISCARRIAGE: ICD-10-CM

## 2021-07-07 LAB — B-HCG SERPL-ACNC: 21 MLU/ML (ref 0–4)

## 2021-07-07 PROCEDURE — 36415 COLL VENOUS BLD VENIPUNCTURE: CPT

## 2021-07-08 NOTE — RESULT ENCOUNTER NOTE
Please call patient with results: Your repeat lab this week is lower than last week, which means you did have a miscarriage. Your hemoglobin level was normal. I am so sorry you are going through this difficult situation. Please make an appointment if you have any other questions or concerns.    VERONIKAM.

## 2021-07-30 ENCOUNTER — OFFICE VISIT (OUTPATIENT)
Dept: FAMILY MEDICINE | Facility: CLINIC | Age: 41
End: 2021-07-30
Payer: COMMERCIAL

## 2021-07-30 VITALS
HEART RATE: 58 BPM | BODY MASS INDEX: 35.9 KG/M2 | TEMPERATURE: 97.8 F | OXYGEN SATURATION: 98 % | HEIGHT: 63 IN | RESPIRATION RATE: 20 BRPM | WEIGHT: 202.6 LBS | SYSTOLIC BLOOD PRESSURE: 109 MMHG | DIASTOLIC BLOOD PRESSURE: 74 MMHG

## 2021-07-30 DIAGNOSIS — O03.9 COMPLETE MISCARRIAGE: ICD-10-CM

## 2021-07-30 DIAGNOSIS — K76.0 HEPATIC STEATOSIS: ICD-10-CM

## 2021-07-30 DIAGNOSIS — R51.9 CHRONIC NONINTRACTABLE HEADACHE, UNSPECIFIED HEADACHE TYPE: ICD-10-CM

## 2021-07-30 DIAGNOSIS — G89.29 CHRONIC NONINTRACTABLE HEADACHE, UNSPECIFIED HEADACHE TYPE: ICD-10-CM

## 2021-07-30 DIAGNOSIS — E66.812 CLASS 2 OBESITY WITHOUT SERIOUS COMORBIDITY WITH BODY MASS INDEX (BMI) OF 35.0 TO 35.9 IN ADULT, UNSPECIFIED OBESITY TYPE: Primary | ICD-10-CM

## 2021-07-30 DIAGNOSIS — F33.1 MODERATE EPISODE OF RECURRENT MAJOR DEPRESSIVE DISORDER (H): ICD-10-CM

## 2021-07-30 DIAGNOSIS — N92.6 IRREGULAR PERIODS: ICD-10-CM

## 2021-07-30 LAB
ALBUMIN SERPL-MCNC: 3.4 G/DL (ref 3.5–5)
ALP SERPL-CCNC: 55 U/L (ref 45–120)
ALT SERPL W P-5'-P-CCNC: 37 U/L (ref 0–45)
ANION GAP SERPL CALCULATED.3IONS-SCNC: 8 MMOL/L (ref 5–18)
AST SERPL W P-5'-P-CCNC: 21 U/L (ref 0–40)
BILIRUB SERPL-MCNC: 0.4 MG/DL (ref 0–1)
BUN SERPL-MCNC: 8 MG/DL (ref 8–22)
CALCIUM SERPL-MCNC: 9 MG/DL (ref 8.5–10.5)
CHLORIDE BLD-SCNC: 104 MMOL/L (ref 98–107)
CO2 SERPL-SCNC: 26 MMOL/L (ref 22–31)
CREAT SERPL-MCNC: 0.76 MG/DL (ref 0.6–1.1)
GFR SERPL CREATININE-BSD FRML MDRD: >90 ML/MIN/1.73M2
GLUCOSE BLD-MCNC: 107 MG/DL (ref 70–125)
HBA1C MFR BLD: 5.1 % (ref 0–5.6)
HCG SERPL-ACNC: <2 MLU/ML (ref 0–4)
HCG UR QL: NEGATIVE
POTASSIUM BLD-SCNC: 3.6 MMOL/L (ref 3.5–5)
PROT SERPL-MCNC: 7.7 G/DL (ref 6–8)
SODIUM SERPL-SCNC: 138 MMOL/L (ref 136–145)

## 2021-07-30 PROCEDURE — 96127 BRIEF EMOTIONAL/BEHAV ASSMT: CPT

## 2021-07-30 PROCEDURE — 83036 HEMOGLOBIN GLYCOSYLATED A1C: CPT

## 2021-07-30 PROCEDURE — 99396 PREV VISIT EST AGE 40-64: CPT | Mod: 25

## 2021-07-30 PROCEDURE — 36415 COLL VENOUS BLD VENIPUNCTURE: CPT

## 2021-07-30 PROCEDURE — 84702 CHORIONIC GONADOTROPIN TEST: CPT

## 2021-07-30 PROCEDURE — 81025 URINE PREGNANCY TEST: CPT

## 2021-07-30 PROCEDURE — T1013 SIGN LANG/ORAL INTERPRETER: HCPCS

## 2021-07-30 PROCEDURE — 80053 COMPREHEN METABOLIC PANEL: CPT

## 2021-07-30 RX ORDER — SUMATRIPTAN 50 MG/1
50 TABLET, FILM COATED ORAL
Qty: 18 TABLET | Refills: 0 | Status: SHIPPED | OUTPATIENT
Start: 2021-07-30 | End: 2021-11-02

## 2021-07-30 ASSESSMENT — MIFFLIN-ST. JEOR: SCORE: 1549.24

## 2021-07-30 ASSESSMENT — PATIENT HEALTH QUESTIONNAIRE - PHQ9: SUM OF ALL RESPONSES TO PHQ QUESTIONS 1-9: 5

## 2021-07-30 NOTE — NURSING NOTE
Due to patient being non-English speaking/uses sign language, an  was used for this visit. Only for face-to-face interpretation by an external agency, date and length of interpretation can be found on the scanned worksheet.       name:  Mau Verduzco  Agency: [employer]  Language: Nia  Telephone number:   [phone]  Type of interpretation:  Face-to-face, Spoken

## 2021-07-30 NOTE — PROGRESS NOTES
"Chief Complaint   Patient presents with     Physical     CPE     Nia  present throughout the visit.     Assessment & Plan:    Ed Rangel is a 41 year old woman with hx of Migraine, Depression, Cholelithiasis, Hepatic Steatosis, and recent complete miscarriage, presenting today for a complete physical exam.    Migraine, Chronic  She has been having 1-2 migraines per week on a \"bad\" week, but she can go several weeks without any. Imitrex helps, and she feels she is not using it very often and it is improved from previously. She has associated light and sound sensitivity as well.   - Refill Imitrex 50 mg PRN    Hepatic Steatosis:  Noted on Abdominal US in May 2021, checked for elevated ALT at the time. Last ALT elevated 78.9 in 02/2021.   - Check CMP   - Counseled regarding weight loss and improving nutrition will benefit this.    Mood:   She feels her mood is overall well, and improved from previous. She is forgetting to take sertraline 1-2 times per week. Does report she feels better when she takes. Reports 7 days a month feeling sad or down. Denies thoughts of suicide or self harm. PHQ-9 score 5 today.  - Refill Sertraline 50 mg daily  - Consider changing to different selective serotonin reuptake inhibitor if she has trouble taking this regularly due to shorter half life.   - Discussed importance of taking medication regularly to be most effective.     Right knee pain  Fall:  She has had intermittent increased right knee pain. Worse with walking. She can walk about 2 blocks before it starts hurting. She feels the knee is weak and rubbing. Tylenol helps her pain. Denies associated redness or swelling unless she falls on it.  She falls 1-2 times per month, feels her knee gives out. She has previously seen PT for this problem and it was somewhat helpful. She feels like it is too hard to get transportation for PT right now, but she has the exercises at home. Requesting handicapped sticker.  - Encourage her to " "continue with PT exercises  - Continue walking short distances as able  - Will hold on handicap sticker for now to encourage walking, but may need to re-evaluate during winter d/t fall risk.     Recent miscarriage, complete:  Pt has a pregnancy loss early July 2021. Beta Hcg trending down. Last value 21. Did not come for follow up re-check. She feels well. Reports she is currently getting her period. No fevers, or abdominal pain. Feels her mood has been fine since the loss.   - Re-check B-Hcg    Health Maintenance:  Weight:  BMI 35.45. She feels okay about her weight right now. Is not interested in trying to be more aggressive about weight loss.  - Will check HbA1c, last done 2018  Nutrition:   Eats fruits and vegetables, no concerns.   Exercise:   Walks almost everyday for short distances. Does not walk on days her mood is down.   Smoking:   Not a smoker  Mammogram:  Not interested in a mammogram at this point.   Vaccines:  UTD on vaccines currently. Had COVID vaccine.   Social:  Feels safe at home, no concerns.     Subjective   Moo is a 41 year old woman with hx of migraine, depression, hepatic steatosis, cholelithiasis, and recent miscarriage, presenting for complete physical. She reports feeling well overall. She is requesting medication refills and a handicap sticker. Has had some pain in her right knee off and     Review of Systems   General: Denies recent illness or fever  Skin: Denies new or changing moles, denies rashes  Eyes: Denies changes in vision  Respiratory: Denies cough or SOB  Cardiovascular: Denies cp or palpiatations  Gastrointestinal: Denies constipation or diarrhea  Genitourinary: Denies painful or frequent urination  Musculoskeletal: +Right knee pain intermittently  Neurologic: Denies numbness or tingling of extremities  Psychiatric: Denies changes in mood      Objective    /74   Pulse 58   Temp 97.8  F (36.6  C) (Oral)   Resp 20   Ht 1.61 m (5' 3.39\")   Wt 91.9 kg (202 lb 9.6 oz)  "  LMP 07/29/2021 (Exact Date)   SpO2 98%   BMI 35.45 kg/m    Body mass index is 35.45 kg/m .  Physical Exam   Physical Exam   Constitutional: Awake, alert, cooperative, no apparent distress, and appears stated age.  Eyes: Lids and lashes normal, extra ocular muscles intact, sclera clear, conjunctiva normal. Has slight right eyelid droop, she reports is chronic  ENT: Normocephalic, without obvious abnormality, atraumatic oral pharynx with moist mucus membranes  Neck: Supple, symmetrical, trachea midline, no adenopathy  Lungs: No increased work of breathing, good air exchange, clear to auscultation bilaterally, no crackles or wheezing.  Cardiovascular: Regular rate and rhythm, normal S1 and S2  Abdomen: normal bowel sounds, soft, non-distended, non-tender, no masses palpated, no hepatosplenomegally. Negative walls's sign.  Musculoskeletal: Full range of motion noted. Tone is normal. +tenderness with pressure on right knee. No redness, swelling or warmth to right knee.   Neurologic: Awake and alert. EOM intact. Motor is 5 out of 5 bilaterally. Gait is normal.  Neuropsychiatric: Normal affect, mood

## 2021-07-30 NOTE — PATIENT INSTRUCTIONS
Continue to take Sertraline daily for your mood.    Continue to take Imitrex as needed for your migraines.    We will check lab work today, and I will call or send a message with your results.    Continue to walk daily. Call if your right knee pain gets significantly worse and schedule an appointment.     Thank you for discussing your health care with me today! It was nice to meet you. Call with any questions or concerns.     Puja Guerrero

## 2021-08-24 ENCOUNTER — OFFICE VISIT (OUTPATIENT)
Dept: FAMILY MEDICINE | Facility: CLINIC | Age: 41
End: 2021-08-24
Payer: COMMERCIAL

## 2021-08-24 VITALS
TEMPERATURE: 97.9 F | HEART RATE: 61 BPM | WEIGHT: 200.8 LBS | RESPIRATION RATE: 20 BRPM | DIASTOLIC BLOOD PRESSURE: 77 MMHG | OXYGEN SATURATION: 98 % | SYSTOLIC BLOOD PRESSURE: 113 MMHG | BODY MASS INDEX: 35.14 KG/M2

## 2021-08-24 DIAGNOSIS — K64.4 EXTERNAL HEMORRHOIDS: Primary | ICD-10-CM

## 2021-08-24 PROCEDURE — 99213 OFFICE O/P EST LOW 20 MIN: CPT | Mod: GC | Performed by: STUDENT IN AN ORGANIZED HEALTH CARE EDUCATION/TRAINING PROGRAM

## 2021-08-24 RX ORDER — HYDROCORTISONE 25 MG/G
CREAM TOPICAL 2 TIMES DAILY PRN
Qty: 30 G | Refills: 1 | Status: SHIPPED | OUTPATIENT
Start: 2021-08-24 | End: 2022-03-02

## 2021-08-24 NOTE — PROGRESS NOTES
Preceptor Attestation:    I discussed the patient with the resident and evaluated the patient in person. I have verified the content of the note, which accurately reflects my assessment of the patient and the plan of care.   Supervising Physician:  Jozef Guidry MD.

## 2021-08-24 NOTE — PATIENT INSTRUCTIONS
It was great to meet you in clinic today! Please apply the cream to the hemorrhoids up to 2 times a day.

## 2021-08-24 NOTE — NURSING NOTE
Due to patient being non-English speaking/uses sign language, an  was used for this visit. Only for face-to-face interpretation by an external agency, date and length of interpretation can be found on the scanned worksheet.       name:  Mau Verduzco  Agency: [employer]  Language: Nia  Telephone number:   [phone]  Type of interpretation:  Face-to-face, Spoken

## 2021-08-24 NOTE — PROGRESS NOTES
Assessment & Plan     External hemorrhoids  Hx and exam findings most consistent with external hemorrhoids. She does not have skin break down, drainage, redness that would be consistent with anal fissure, cyst, or cellulitis.   - hydrocortisone, Perianal, (HYDROCORTISONE) 2.5 % cream  Dispense: 30 g; Refill: 1      30 minutes spent on the date of the encounter doing chart review, history and exam, documentation and further activities per the note      Return in about 4 weeks (around 9/21/2021) for in person.    Aruna Portillo MD  [facility]  Precepted with Dr. Chao Wall   Ed is a 41 year old who presents for the following health issues  accompanied by her :    HPI     Swelling on her bottom for 3 days, had this before, not sure how long ago. Did not see a doctor for this in the past. Now it is happening more frequently. She takes tylenol, and has no other changes in other medications. She used a friend's pill, which she is unsure of, once and noted some relief. Has painful bowel movements, but is not constipated. Noted that wiping is painful. No blood noted. She notes that sitting completely on her bottom is painful, so she has to sit on one side.     She also notes some burning with urination. Does not have lower abdominal pain, no malodorous urine.     Review of Systems   Complete ROS negative aside noted in HPI      Objective    /77   Pulse 61   Temp 97.9  F (36.6  C) (Oral)   Resp 20   Wt 91.1 kg (200 lb 12.8 oz)   LMP 07/29/2021 (Exact Date)   SpO2 98%   BMI 35.14 kg/m    Body mass index is 35.14 kg/m .  Physical Exam   GENERAL: healthy, alert and no distress  RESP: lungs clear to auscultation - no rales, rhonchi or wheezes  CV: regular rate and rhythm, normal S1 S2, no S3 or S4, no murmur, click or rub, no peripheral edema and peripheral pulses strong  ABDOMEN: soft, nontender, no hepatosplenomegaly, no masses and bowel sounds normal  RECTAL  (female): normal sphincter tone, approx 1.5cm skin colored mass on anus, TTP. No fissures noted  MS: no gross musculoskeletal defects noted, no edema      ----- Service Performed and Documented by Resident or Fellow ------

## 2021-11-01 DIAGNOSIS — R51.9 CHRONIC NONINTRACTABLE HEADACHE, UNSPECIFIED HEADACHE TYPE: ICD-10-CM

## 2021-11-01 DIAGNOSIS — G89.29 CHRONIC NONINTRACTABLE HEADACHE, UNSPECIFIED HEADACHE TYPE: ICD-10-CM

## 2021-11-02 RX ORDER — SUMATRIPTAN 50 MG/1
50 TABLET, FILM COATED ORAL
Qty: 18 TABLET | Refills: 0 | Status: SHIPPED | OUTPATIENT
Start: 2021-11-02 | End: 2021-12-29

## 2021-11-02 NOTE — TELEPHONE ENCOUNTER
Patient requesting medication refill of sumatriptan for migraine control. Last refill was 3 months ago for 18 pills. At that time she had an appointment with Dr. Guerrero for the migraine control. Medication refilled.

## 2021-11-03 ENCOUNTER — OFFICE VISIT (OUTPATIENT)
Dept: FAMILY MEDICINE | Facility: CLINIC | Age: 41
End: 2021-11-03
Payer: COMMERCIAL

## 2021-11-03 VITALS
OXYGEN SATURATION: 98 % | BODY MASS INDEX: 35.21 KG/M2 | HEART RATE: 58 BPM | DIASTOLIC BLOOD PRESSURE: 78 MMHG | TEMPERATURE: 98.2 F | RESPIRATION RATE: 16 BRPM | SYSTOLIC BLOOD PRESSURE: 111 MMHG | WEIGHT: 201.2 LBS

## 2021-11-03 DIAGNOSIS — G43.009 MIGRAINE WITHOUT AURA AND WITHOUT STATUS MIGRAINOSUS, NOT INTRACTABLE: Primary | ICD-10-CM

## 2021-11-03 PROCEDURE — T1013 SIGN LANG/ORAL INTERPRETER: HCPCS

## 2021-11-03 PROCEDURE — 99214 OFFICE O/P EST MOD 30 MIN: CPT | Mod: GC

## 2021-11-03 RX ORDER — VENLAFAXINE HYDROCHLORIDE 37.5 MG/1
TABLET, EXTENDED RELEASE ORAL
Qty: 381 TABLET | Refills: 0 | Status: SHIPPED | OUTPATIENT
Start: 2021-11-03 | End: 2022-03-02

## 2021-11-03 NOTE — PROGRESS NOTES
Preceptor Attestation:    I discussed the patient with the resident and evaluated the patient in person. I have verified the content of the note, which accurately reflects my assessment of the patient and the plan of care.   Supervising Physician:  Ladarius George DO.

## 2021-11-03 NOTE — NURSING NOTE
Due to patient being non-English speaking/uses sign language, an  was used for this visit. Only for face-to-face interpretation by an external agency, date and length of interpretation can be found on the scanned worksheet.       name:  Mau Verduzco  Agency: [employer]  Language: Nia  Telephone number:   [phone]  Type of interpretation:  Face-to-face, Spoken

## 2021-11-03 NOTE — PROGRESS NOTES
Assessment & Plan     Migraine without aura and without status migrainosus, not intractable  Patient experiencing many headache days despite sumatriptan use for as needed breakthrough.  Trouble remembering exact amount due to memory issues.  Had previous preventative medicine but has trouble with remembering to take every day due to memory issues. Low heart rate rules out use of beta blockers. Handicapped with limited walking makes topiramate a possible fall risk. Would like to start venlafaxine first and see if benefit.   - venlafaxine (EFFEXOR-ER) 37.5 MG 24 hr tablet; Take 1 tablet (37.5 mg) by mouth daily for 7 days, THEN 2 tablets (75 mg) daily for 7 days, THEN 4 tablets (150 mg) daily.      Diagnosis or treatment significantly limited by social determinants of health - SES and language barrier  Ordering of each unique test  Prescription drug management  40 minutes spent on the date of the encounter doing chart review, history and exam, documentation and further activities per the note       MEDICATIONS:   Orders Placed This Encounter   Medications     venlafaxine (EFFEXOR-ER) 37.5 MG 24 hr tablet     Sig: Take 1 tablet (37.5 mg) by mouth daily for 7 days, THEN 2 tablets (75 mg) daily for 7 days, THEN 4 tablets (150 mg) daily.     Dispense:  381 tablet     Refill:  0     Patient has memory issues.  To help with adherence, please give taper doses separately or in blister packs if possible.          - Continue other medications without change    Follow up for next visit: patient to visit and address handicapped parking sticker needs. Could not discuss today due to other engagement she needed to be at.     No follow-ups on file.    Abigail Bee MD  [facility] CAROLINA Ward is a 41 year old who presents for the following health issues  accompanied by her .    HPI   Patient has struggled with migraines for many years. She reports unilateral, shooting pain with no aura,  associated with nausea and photosensitivity that improves with rest and dark room.  She currently takes sumatriptan as needed for headaches.  She says it works well, but that she's been experiencing a lot of headaches lately.  Could not recall exact number per week or month, citing memory issues. She reports a prior prophylaxis medicine that worked well but she had some adherence issues due to memory issues.     She does not report getting lost in familiar environments or forgetting names or faces of familiar people but does report forgetting if she sets something down.  Depression screen was positive only for low mood.     Review of Systems   CONSTITUTIONAL: NEGATIVE for fever, chills, change in weight  INTEGUMENTARY/SKIN: NEGATIVE for worrisome rashes, moles or lesions  EYES: NEGATIVE for vision changes or irritation  ENT/MOUTH: NEGATIVE for ear, mouth and throat problems  RESP: NEGATIVE for significant cough or SOB  BREAST: NEGATIVE for masses, tenderness or discharge  CV: NEGATIVE for chest pain, palpitations or peripheral edema  GI: NEGATIVE for nausea, abdominal pain, heartburn, or change in bowel habits  : NEGATIVE for frequency, dysuria, or hematuria  MUSCULOSKELETAL: Positive for impaired gait.  NEGATIVE for significant arthralgias or myalgia  NEURO: Positive for headaches. NEGATIVE for weakness, dizziness or paresthesias  ENDOCRINE: NEGATIVE for temperature intolerance, skin/hair changes  HEME: NEGATIVE for bleeding problems  PSYCHIATRIC: Positive for low mood      Objective    /78 (BP Location: Left arm, Patient Position: Sitting, Cuff Size: Adult Large)   Pulse 58   Temp 98.2  F (36.8  C) (Oral)   Resp 16   Wt 91.3 kg (201 lb 3.2 oz)   LMP 10/13/2021 (Exact Date)   SpO2 98%   Breastfeeding No   BMI 35.21 kg/m    Body mass index is 35.21 kg/m .  Physical Exam   GENERAL: healthy, alert and no distress  EYES: Dysconjugate gaze, gray film over R eye with jagged edge by pupil, PERRL but  patient noted discomfort with bright light   HENT: ear canals and TM's normal, R nare had red sore in back  NECK: no adenopathy, no asymmetry, masses, or scars and thyroid normal to palpation  RESP: lungs clear to auscultation - no rales, rhonchi or wheezes  CV: regular rate and rhythm, normal S1 S2, no S3 or S4, no murmur, click or rub, no peripheral edema and peripheral pulses strong  MS: extremities normal- no gross deformities noted, slight difficulty walking over to table, anatalgic gait  NEURO: Normal strength and tone, mentation intact and speech normal  PSYCH: mentation appears normal, affect normal/bright  LYMPH: no cervical, supraclavicular, axillary, or inguinal adenopathy    SIGMECAPS: positive for mood only    ----- Service Performed and Documented by Resident or Fellow ------

## 2021-11-08 ENCOUNTER — TELEPHONE (OUTPATIENT)
Dept: ORTHOPEDICS | Facility: CLINIC | Age: 41
End: 2021-11-08
Payer: COMMERCIAL

## 2021-11-08 NOTE — TELEPHONE ENCOUNTER
Central Prior Authorization Team   Phone: [phone]        PA Initiation    Medication: venlafaxine (EFFEXOR-ER) 37.5 MG 24 hr tablet-PA initiated  Insurance Company: [employer] - Phone [phone] Fax [phone]  Pharmacy Filling the Rx: [id number] - [place] [[address]  Filling Pharmacy Phone:    Filling Pharmacy Fax:    Start Date: 11/8/2021

## 2021-11-09 NOTE — TELEPHONE ENCOUNTER
PRIOR AUTHORIZATION DENIED    Medication: venlafaxine (EFFEXOR-ER) 37.5 MG 24 hr tablet-PA denied    Denial Date: 11/8/2021    Denial Rational: Must try/fail IR capsules        Appeal Information:

## 2021-12-29 ENCOUNTER — OFFICE VISIT (OUTPATIENT)
Dept: FAMILY MEDICINE | Facility: CLINIC | Age: 41
End: 2021-12-29
Payer: COMMERCIAL

## 2021-12-29 ENCOUNTER — DOCUMENTATION ONLY (OUTPATIENT)
Dept: FAMILY MEDICINE | Facility: CLINIC | Age: 41
End: 2021-12-29

## 2021-12-29 VITALS
OXYGEN SATURATION: 96 % | WEIGHT: 205.8 LBS | TEMPERATURE: 97.3 F | RESPIRATION RATE: 18 BRPM | HEART RATE: 53 BPM | DIASTOLIC BLOOD PRESSURE: 79 MMHG | BODY MASS INDEX: 36.01 KG/M2 | SYSTOLIC BLOOD PRESSURE: 119 MMHG

## 2021-12-29 DIAGNOSIS — R51.9 CHRONIC NONINTRACTABLE HEADACHE, UNSPECIFIED HEADACHE TYPE: ICD-10-CM

## 2021-12-29 DIAGNOSIS — Z23 ENCOUNTER FOR IMMUNIZATION: ICD-10-CM

## 2021-12-29 DIAGNOSIS — M54.50 ACUTE BILATERAL LOW BACK PAIN WITHOUT SCIATICA: Primary | ICD-10-CM

## 2021-12-29 DIAGNOSIS — G89.29 CHRONIC NONINTRACTABLE HEADACHE, UNSPECIFIED HEADACHE TYPE: ICD-10-CM

## 2021-12-29 DIAGNOSIS — E66.01 MORBID OBESITY (H): ICD-10-CM

## 2021-12-29 LAB
ALBUMIN UR-MCNC: NEGATIVE MG/DL
APPEARANCE UR: CLEAR
BACTERIA #/AREA URNS HPF: ABNORMAL /HPF
BILIRUB UR QL STRIP: NEGATIVE
COLOR UR AUTO: YELLOW
GLUCOSE UR STRIP-MCNC: NEGATIVE MG/DL
HGB UR QL STRIP: NEGATIVE
KETONES UR STRIP-MCNC: NEGATIVE MG/DL
LEUKOCYTE ESTERASE UR QL STRIP: ABNORMAL
NITRATE UR QL: NEGATIVE
PH UR STRIP: 6 [PH] (ref 5–8)
RBC #/AREA URNS AUTO: ABNORMAL /HPF
SP GR UR STRIP: 1.02 (ref 1–1.03)
UROBILINOGEN UR STRIP-ACNC: 0.2 E.U./DL
WBC #/AREA URNS AUTO: ABNORMAL /HPF

## 2021-12-29 PROCEDURE — 90471 IMMUNIZATION ADMIN: CPT | Performed by: STUDENT IN AN ORGANIZED HEALTH CARE EDUCATION/TRAINING PROGRAM

## 2021-12-29 PROCEDURE — 81001 URINALYSIS AUTO W/SCOPE: CPT | Performed by: STUDENT IN AN ORGANIZED HEALTH CARE EDUCATION/TRAINING PROGRAM

## 2021-12-29 PROCEDURE — 99214 OFFICE O/P EST MOD 30 MIN: CPT | Mod: 25 | Performed by: STUDENT IN AN ORGANIZED HEALTH CARE EDUCATION/TRAINING PROGRAM

## 2021-12-29 PROCEDURE — 90686 IIV4 VACC NO PRSV 0.5 ML IM: CPT | Performed by: STUDENT IN AN ORGANIZED HEALTH CARE EDUCATION/TRAINING PROGRAM

## 2021-12-29 RX ORDER — TIZANIDINE 2 MG/1
2 TABLET ORAL 3 TIMES DAILY PRN
Qty: 30 TABLET | Refills: 0 | Status: SHIPPED | OUTPATIENT
Start: 2021-12-29 | End: 2022-03-02

## 2021-12-29 RX ORDER — SUMATRIPTAN 50 MG/1
50 TABLET, FILM COATED ORAL
Qty: 18 TABLET | Refills: 0 | Status: SHIPPED | OUTPATIENT
Start: 2021-12-29 | End: 2022-03-02

## 2021-12-29 RX ORDER — CAPSAICIN 0.025 %
1 CREAM (GRAM) TOPICAL 3 TIMES DAILY PRN
Qty: 237 G | Refills: 1 | Status: SHIPPED | OUTPATIENT
Start: 2021-12-29 | End: 2022-03-02

## 2021-12-29 ASSESSMENT — PATIENT HEALTH QUESTIONNAIRE - PHQ9: SUM OF ALL RESPONSES TO PHQ QUESTIONS 1-9: 0

## 2021-12-29 NOTE — NURSING NOTE
Injectable influenza vaccine documentation    1. Has the patient received the information for the influenza vaccine? YES    2. Does the patient have a severe allergy to eggs (Patients with a severe egg allergy should be assessed by a medical provider, RN, or clinical pharmacist. If they receive the influenza vaccine, please have them observed for 15 minutes.)? No    3. Has the patient had an allergic reaction to previous influenza vaccines? No    4. Has the patient had any severe allergic reactions to past influenza vaccines ? No       5. Does patient have a history of Guillain-Portage syndrome? No           Based on responses above, I administered the influenza vaccine.  November Paw, CMA

## 2021-12-29 NOTE — NURSING NOTE
Due to patient being non-English speaking/uses sign language, an  was used for this visit. Only for face-to-face interpretation by an external agency, date and length of interpretation can be found on the scanned worksheet.       name: Calvin Centeno (Htoo)  Language: Nia  Agency:  [employer]  Phone number: [phone]  Type of interpretation:  Face-to-face, spoken

## 2021-12-29 NOTE — PROGRESS NOTES
To be completed in Nursing note:    Please reference list for forms that require a visit for completion.  Please remind patients that providers are given 3-5 business days to complete and return forms.      Form type:[employer] [employer]  and Vehicle Services: Application for Disability Parking Certificate    Date form received: 2021    Date form completed by Physician:2021    How was form returned to patient (mailed, faxed, or at  for patient to ): Mailed to [address] [place] [address] and scanned copy into pt's chart.    Date form mailed/faxed/left at  for patient and sent to HIM for [email or web address]    Once form is left for patient, faxed, or mailed PCS will then close the documentation only encounter.

## 2021-12-29 NOTE — PATIENT INSTRUCTIONS
Use ibuprofen 400 every 6 hours.   Cream for back as needed.   Tizanidine up to 3 times daily as needed- this may make you sleepy.   I will call with urine results.   Call us if symptoms get worse or develop new symptoms.    Patient Education     Back Spasm (No Trauma)    Spasm of the back muscles can occur after a sudden forceful twisting or bending such as in a car accident. A spasm can also happen after a simple awkward movement, or after lifting something heavy with poor body positioning. In any case, muscle spasm adds to the pain. Sleeping in an awkward position or on a poor quality mattress can also cause this. Some people respond to emotional stress by tensing the muscles of their back.  Pain that continues may need further assessment or other types of treatment such as physical therapy.  You don't always need X-rays for the first assessment of back pain, unless you had a physical injury such as from a car accident or fall. If your pain continues and doesn't respond to medical treatment, X-rays and other tests may then be done.   Home care    As soon as possible, start sitting or walking again. This will help prevent problems from a long bed rest. These problems include muscle weakness, worsening back stiffness and pain, and blood clots in the legs.    When in bed, try to find a position of comfort. A firm mattress is best. Try lying flat on your back with pillows under your knees. You can also try lying on your side with your knees bent up toward your chest and a pillow between your knees.    Don't sit for long periods. Also limit car rides and travel. This puts more stress on the lower back than standing or walking.     During the first 24 to 72 hours after an injury or flare-up, put an ice pack on the painful area for 20 minutes, then remove it for 20 minutes. Do this over a period of 60 to 90 minutes, or several times a day. This will reduce swelling and pain. Always wrap ice packs in a thin towel.    You  can start with ice, then switch to heat. Heat from a hot shower, hot bath, or heating pad reduces pain and works well for muscle spasms. Put heat on the painful area for 20 minutes, then remove it for 20 minutes. Do this over a period of 60 to 90 minutes, or several times a day. Don't sleep on a heating pad. It can burn or damage skin.    Alternate using ice and heat.    Be aware of safe lifting methods. don't lift anything over 15 pounds until all the pain is gone.  Gentle stretching will help your back heal faster. Do this simple routine 2 to 3 times a day until your back is feeling better.    Lie on your back with your knees bent and both feet on the ground.    Slowly raise your left knee to your chest as you flatten your lower back against the floor. Hold for 20 to 30 seconds.    Relax and repeat the exercise with your right knee.    Do 2 to 3 of these exercises for each leg.    Repeat, hugging both knees to your chest at the same time.    Don't bounce, but use a gentle pull.  Medicines  Talk with your doctor before using medicine, especially if you have other medical problems or are taking other medicines.  You may use over-the-counter medicines such as acetaminophen, ibuprofen, or naprosyn to control pain, unless your healthcare provider prescribed another pain medicine. Talk with your healthcare provider if you have a chronic condition such as diabetes, liver or kidney disease, stomach ulcer, or digestive bleeding, or are taking blood thinners.  Be careful if you are given prescription pain medicine, opioids, or medicine for muscle spasm. They can cause drowsiness, and affect your coordination, reflexes, and judgment. Don't drive or operate heavy machinery when taking these medicines. Take pain medicine only as prescribed by your healthcare provider.  Follow-up care  Follow up with your doctor, or as advised. You may need physical therapy or more tests.  If X-rays were taken, they may be reviewed by a  radiologist. You will be told of any new findings that may affect your care.  Call   Call if any of these occur:    Trouble breathing    Confusion    Drowsiness or trouble awakening    Fainting or loss of consciousness    Rapid or very slow heart rate    Loss of bowel or bladder control  When to seek medical advice  Call your healthcare provider right away if any of these occur:    Pain becomes worse or spreads to your legs    Weakness or numbness in one or both legs    Numbness in the groin or genital area    Fever of 100.4 F (38 C) or higher , or as directed by your healthcare provider    Chills    Burning or pain when passing urine  [employer]Well last reviewed this educational content on 11/1/2018 2000-2021 [employer]. All rights reserved. This information is not intended as a substitute for professional medical care. Always follow your healthcare professional's instructions.

## 2021-12-29 NOTE — PROGRESS NOTES
Assessment & Plan     1. Acute bilateral low back pain without sciatica  Several days of lower back pain, not secondary to any known injury. No red flag symptoms at this time. Seems to be MSK in nature. Plan to start with NSAIDs, stretching, and conservative management. Could consider imagining if fails to improve over the next month or if new/worsening symptoms.   - tiZANidine (ZANAFLEX) 2 MG tablet; Take 1 tablet (2 mg) by mouth 3 times daily as needed for muscle spasms  Dispense: 30 tablet; Refill: 0  - capsaicin (ZOSTRIX) 0.025 % external cream; Apply 1 g topically 3 times daily as needed (back pain)  Dispense: 237 g; Refill: 1  - UA reflex to Microscopic and Culture  - Urine Microscopic    2. Encounter for immunization  - INFLUENZA VACCINE IM >6 MO VALENT IIV4 (AFLURIA/FLUZONE)    3. Morbid obesity (H)  BMI 36    4. Chronic nonintractable headache, unspecified headache type  Requesting refill today.   - SUMAtriptan (IMITREX) 50 MG tablet; Take 1 tablet (50 mg) by mouth at onset of headache for migraine May repeat in 2 hours. Max 4 tablets/24 hours.  Dispense: 18 tablet; Refill: 0      Return in about 4 weeks (around 1/26/2022).    Terri Tejeda MD PGY3  [facility]    Mae Ward is a 41 year old who presents for the following health issues:    HPI   Reports back pain that started 4-5 days ago. She noticed it when she woke up. No trauma or change in activity the day before. Pain is in the mid-lower back on both sides. When she lays down, pain shoots up the back. Doesn't shoot down the legs. She also notes pain under her belly button, worse when you push. No burning with urination or blood in urine. No vaginal discharge. Taking tylenol and ibuprofen for pain- which is minimally helpful. No weakness, numbness, or tingling. No loss of bowel/bladder function. Movement makes it better.     This happened to her a long time ago when she was living in [facility]. She was given IM injection  and other oral medication and it got better.       Review of Systems   Constitutional, HEENT, cardiovascular, pulmonary, gi and gu systems are negative, except as otherwise noted.      Objective    /79 (BP Location: Left arm, Patient Position: Sitting, Cuff Size: Adult Regular)   Pulse 53   Temp 97.3  F (36.3  C) (Oral)   Resp 18   Wt 93.4 kg (205 lb 12.8 oz)   LMP  (Approximate)   SpO2 96%   BMI 36.01 kg/m    Body mass index is 36.01 kg/m .  Physical Exam   GENERAL: healthy, alert and no distress  RESP: lungs clear to auscultation - no rales, rhonchi or wheezes  CV: regular rate and rhythm, normal S1 S2, no S3 or S4, no murmur, click or rub, no peripheral edema and peripheral pulses strong  ABDOMEN: soft, nondistended, mild suprapubic tenderness  SKIN: no suspicious lesions or rashes  NEURO: Normal strength and tone, mentation intact and speech normal  Comprehensive back pain exam:  mild lower paraspinal tenderness, Lower extremity strength functional and equal on both sides, Lower extremity sensation normal and equal on both sides and Straight leg raise negative bilaterally    No results found for this or any previous visit (from the past 24 hour(s)).

## 2022-01-28 ENCOUNTER — IMMUNIZATION (OUTPATIENT)
Dept: FAMILY MEDICINE | Facility: CLINIC | Age: 42
End: 2022-01-28
Payer: COMMERCIAL

## 2022-01-28 PROCEDURE — 91306 COVID-19,PF,MODERNA (18+ YRS BOOSTER .25ML): CPT

## 2022-01-28 PROCEDURE — 0064A COVID-19,PF,MODERNA (18+ YRS BOOSTER .25ML): CPT

## 2022-03-02 ENCOUNTER — OFFICE VISIT (OUTPATIENT)
Dept: FAMILY MEDICINE | Facility: CLINIC | Age: 42
End: 2022-03-02
Payer: COMMERCIAL

## 2022-03-02 VITALS
OXYGEN SATURATION: 95 % | TEMPERATURE: 98.1 F | HEART RATE: 56 BPM | WEIGHT: 200.8 LBS | BODY MASS INDEX: 34.28 KG/M2 | HEIGHT: 64 IN | RESPIRATION RATE: 16 BRPM | SYSTOLIC BLOOD PRESSURE: 115 MMHG | DIASTOLIC BLOOD PRESSURE: 77 MMHG

## 2022-03-02 DIAGNOSIS — R09.82 POST-NASAL DRIP: ICD-10-CM

## 2022-03-02 DIAGNOSIS — G43.009 MIGRAINE WITHOUT AURA AND WITHOUT STATUS MIGRAINOSUS, NOT INTRACTABLE: Primary | ICD-10-CM

## 2022-03-02 DIAGNOSIS — R06.83 SNORING: ICD-10-CM

## 2022-03-02 PROCEDURE — 99214 OFFICE O/P EST MOD 30 MIN: CPT | Mod: GC | Performed by: STUDENT IN AN ORGANIZED HEALTH CARE EDUCATION/TRAINING PROGRAM

## 2022-03-02 RX ORDER — VENLAFAXINE HYDROCHLORIDE 37.5 MG/1
TABLET, EXTENDED RELEASE ORAL
Qty: 381 TABLET | Refills: 0 | Status: CANCELLED | OUTPATIENT
Start: 2022-03-02 | End: 2022-06-14

## 2022-03-02 RX ORDER — SUMATRIPTAN 50 MG/1
50 TABLET, FILM COATED ORAL
Qty: 30 TABLET | Refills: 0 | Status: SHIPPED | OUTPATIENT
Start: 2022-03-02 | End: 2022-06-24

## 2022-03-02 RX ORDER — FLUTICASONE PROPIONATE 50 MCG
1 SPRAY, SUSPENSION (ML) NASAL DAILY
Qty: 16 G | Refills: 4 | Status: SHIPPED | OUTPATIENT
Start: 2022-03-02 | End: 2022-10-14

## 2022-03-02 ASSESSMENT — ANXIETY QUESTIONNAIRES
GAD7 TOTAL SCORE: 6
6. BECOMING EASILY ANNOYED OR IRRITABLE: SEVERAL DAYS
1. FEELING NERVOUS, ANXIOUS, OR ON EDGE: SEVERAL DAYS
5. BEING SO RESTLESS THAT IT IS HARD TO SIT STILL: NOT AT ALL
3. WORRYING TOO MUCH ABOUT DIFFERENT THINGS: SEVERAL DAYS
7. FEELING AFRAID AS IF SOMETHING AWFUL MIGHT HAPPEN: SEVERAL DAYS
IF YOU CHECKED OFF ANY PROBLEMS ON THIS QUESTIONNAIRE, HOW DIFFICULT HAVE THESE PROBLEMS MADE IT FOR YOU TO DO YOUR WORK, TAKE CARE OF THINGS AT HOME, OR GET ALONG WITH OTHER PEOPLE: NOT DIFFICULT AT ALL
2. NOT BEING ABLE TO STOP OR CONTROL WORRYING: SEVERAL DAYS

## 2022-03-02 ASSESSMENT — PATIENT HEALTH QUESTIONNAIRE - PHQ9
SUM OF ALL RESPONSES TO PHQ QUESTIONS 1-9: 5
5. POOR APPETITE OR OVEREATING: SEVERAL DAYS

## 2022-03-02 NOTE — PROGRESS NOTES
Assessment & Plan     1. Migraine without aura and without status migrainosus, not intractable  She presents for follow up of headaches. Has approximately 1-2 per week. Sumatriptan works well when she needs it. Previously prescribed venlafaxine but has not remembered to take it regularly. D/t side effect profile of not taking venlafaxine regularly, will switch medications. As she has some depressive/anxiety symptoms TCA might be beneficial for her. Will start with amitriptyline low dose with room to increase as needed. I also asked her to complete a headache diary over the next few weeks and return to look for headache pattern and possible triggers.   - SUMAtriptan (IMITREX) 50 MG tablet; Take 1 tablet (50 mg) by mouth at onset of headache for migraine May repeat in 2 hours. Max 4 tablets/24 hours.  Dispense: 30 tablet; Refill: 0  - amitriptyline (ELAVIL) 25 MG tablet; Take 1 tablet (25 mg) by mouth At Bedtime  Dispense: 30 tablet; Refill: 0    2. Post-nasal drip  She reports phlegm/sputum in throat bothering her over the past year. Will trial flonase and followup.   - fluticasone (FLONASE) 50 MCG/ACT nasal spray; Spray 1 spray into both nostrils daily  Dispense: 16 g; Refill: 4    3. Snoring  Also notes heavy snoring, daytime sleepiness, headaches (above), and waking up gasping for air. BMI 35.01 with large neck, large tonsils, and Mallampati score of 3. I have high suspicion for MARIA ANTONIA. Will start with sleep study. Could consider ENT referral in the future.   - Sleep Study Referral; Future      Prescription drug management  35 minutes spent on the date of the encounter doing chart review, history and exam, documentation and further activities per the note    Return in about 2 weeks (around 3/16/2022) for Follow up.    Terri Tejeda MD PGY3  [facility]    Mae Ward is a 41 year old who presents for the following health issues:    HPI     Has a history of migraines without aura.  "Describes them as unilateral shooting pain with photophobia. Using sumatriptan PRN. Prescribed venlafaxine but is unsure if she's taking it. Has a hard time remembering daily medications. Since running out of medication (sumatriptan) on Sunday, has continued to have headaches. She cannot remember how often she gets headaches, but possibly 1-2 times per week.  She does have a pill box at home. She sets it up on her own, but often doesn't remember to take medications.     Also notes increased mucous problem in her throat. Makes it difficult to sleep. Has been going on for almost a year. No runny nose. No dental pain. She snores at night. Occasionally wakes up gasping for air. Does endorse daytime fatigue.     Stop bang score of 3.     Review of Systems   Constitutional, HEENT, cardiovascular, pulmonary, gi and gu systems are negative, except as otherwise noted.      Objective    /77   Pulse 56   Temp 98.1  F (36.7  C) (Oral)   Resp 16   Ht 1.613 m (5' 3.5\")   Wt 91.1 kg (200 lb 12.8 oz)   SpO2 95%   BMI 35.01 kg/m    Body mass index is 35.01 kg/m .  Physical Exam   GENERAL: healthy, alert and no distress  HEENT: 3+ tonsils with mallampati score of 3  NECK: no adenopathy, no asymmetry, masses, or scars and thyroid normal to palpation, neck circumference 37 cm.   RESP: lungs clear to auscultation - no rales, rhonchi or wheezes  CV: regular rate and rhythm, normal S1 S2, no S3 or S4, no murmur, click or rub, no peripheral edema and peripheral pulses strong  MS: no gross musculoskeletal defects noted, no edema    PHQ 7/30/2021 12/29/2021 3/2/2022   PHQ-9 Total Score 5 0 5   Q9: Thoughts of better off dead/self-harm past 2 weeks Not at all Not at all Not at all     KAYLAH-7 SCORE 9/16/2020 9/30/2020 3/2/2022   Total Score 9 5 6       "

## 2022-03-02 NOTE — NURSING NOTE
Due to patient being non-English speaking/uses sign language, an  was used for this visit. Only for face-to-face interpretation by an external agency, date and length of interpretation can be found on the scanned worksheet.     name: CARON   Agency: [employer]  Language: Nia   Telephone number:   Type of interpretation: Face-to-face, spoken

## 2022-03-02 NOTE — PROGRESS NOTES
"Preceptor attestation:  Vital signs reviewed: /77   Pulse 56   Temp 98.1  F (36.7  C) (Oral)   Resp 16   Ht 1.613 m (5' 3.5\")   Wt 91.1 kg (200 lb 12.8 oz)   SpO2 95%   BMI 35.01 kg/m      Patient seen, evaluated, and discussed with the resident.  I have verified the content of the note, which accurately reflects my assessment of the patient and the plan of care.    Supervising physician: Barbara Wright MD  [facility]  "

## 2022-03-03 ASSESSMENT — ANXIETY QUESTIONNAIRES: GAD7 TOTAL SCORE: 6

## 2022-03-04 NOTE — PATIENT INSTRUCTIONS
03/04/22   SLEEP EVALUATION & MANAGEMENT REFERRAL  [facility]  Phone: [phone]  Fax: [phone]    Referral, demographics and medication list faxed to [facility] at [phone] who will contact patient to schedule.    Lindsay Rodriguez

## 2022-03-18 ENCOUNTER — OFFICE VISIT (OUTPATIENT)
Dept: FAMILY MEDICINE | Facility: CLINIC | Age: 42
End: 2022-03-18
Payer: COMMERCIAL

## 2022-03-18 VITALS
BODY MASS INDEX: 34.35 KG/M2 | HEART RATE: 54 BPM | SYSTOLIC BLOOD PRESSURE: 119 MMHG | TEMPERATURE: 97.7 F | DIASTOLIC BLOOD PRESSURE: 84 MMHG | HEIGHT: 64 IN | RESPIRATION RATE: 16 BRPM | OXYGEN SATURATION: 96 % | WEIGHT: 201.2 LBS

## 2022-03-18 DIAGNOSIS — M54.50 CHRONIC BILATERAL LOW BACK PAIN WITHOUT SCIATICA: Primary | ICD-10-CM

## 2022-03-18 DIAGNOSIS — G89.29 CHRONIC BILATERAL LOW BACK PAIN WITHOUT SCIATICA: Primary | ICD-10-CM

## 2022-03-18 PROCEDURE — 99213 OFFICE O/P EST LOW 20 MIN: CPT | Mod: GC

## 2022-03-18 RX ORDER — ACETAMINOPHEN 500 MG
500-1000 TABLET ORAL EVERY 8 HOURS PRN
Qty: 90 TABLET | Refills: 0 | Status: SHIPPED | OUTPATIENT
Start: 2022-03-18 | End: 2022-10-14

## 2022-03-18 NOTE — PROGRESS NOTES
Assessment and Plan      Diagnoses and all orders for this visit:    Chronic bilateral low back pain without sciatica  Reporting 20+ years of chronic low back pain, worsening over the past 1 to 2 weeks.  Present Tylenol and movement.  She has not had a trial of physical therapy.  No red flag symptoms.  Continue Tylenol and refer for therapy.  Patient return in 1 month.  -     acetaminophen (TYLENOL) 500 MG tablet; Take 1-2 tablets (500-1,000 mg) by mouth every 8 hours as needed for mild pain  -     Physical Therapy Referral; Future  Options for treatment and follow-up care were reviewed with the patient. Patient engaged in the decision making process and verbalized understanding of the options discussed and agreed with the final plan.    Patient was staffed with attending physician Dr. Jesus Schumacher, DO PGY1           HPI       Ed Rangel is a 42 year old year old female w/ PMH of   Patient Active Problem List   Diagnosis     Absolute glaucoma, right eye     Blind right eye     Lung granuloma (H)     Microphthalmia with cataract     Migraine     Language barrier, cultural differences     Betel deposit on teeth     Acute pain of right knee     Pulmonary nodules     Depression     Cholelithiasis     Hepatic steatosis     Gallbladder polyp     Morbid obesity (H)    who presents for back pain.    Back pain. Patient states that she has been having back for 1.5 weeks. Describes it as relapsing and remitting for the past few years but has been worsening in the last few weeks. She fell [place] a waterfall in 2000 and thought it hurt her knee and back in [place]. She was searching for vegetable in the woods and fell down. Describes the pain as lower lumbar pain that is sharp and nonconstant. It is better with medications, specifically tylenol, which she takes roughly three times a day, but she recently ran out. She describes the pain as 5/10 now, but has been as bad as a 7/10. It is worse with sitting and better with  "movement. Endorses numbness in her relapsing and remitting all over her body. Denies numbness, tingling, shooting pains, loss of bladder or bowel function, headache, dizziness, chest pain, shortness of breath, fevers, chills, nausea, abdominal pain, diarrhea.    [employer]  was used for this visit.           Review of Systems:   10 point ROS negative other than as specified above.         Physical Exam:   /84 (BP Location: Left arm, Patient Position: Sitting, Cuff Size: Adult Large)   Pulse 54   Temp 97.7  F (36.5  C) (Oral)   Resp 16   Ht 1.633 m (5' 4.29\")   Wt 91.3 kg (201 lb 3.2 oz)   LMP 03/14/2022 (Approximate)   SpO2 96%   BMI 34.22 kg/m       Exam:  Constitutional: healthy, alert, no distress, and cooperative  Head: Normocephalic. No masses, lesions, tenderness or abnormalities  Neck: Neck supple. No adenopathy.  ENT: throat normal without erythema or exudate  Cardiovascular: RRR w/o audible murmur  Respiratory: bilateral clear lungs w/o wheezing, crackles or rhonchi; breathing comfortably on RA  Gastrointestinal: Abdomen soft, non-tender. BS normal.  Musculoskeletal: Tenderness to palpation L2-L4 and paraspinal tenderness in lumbar region.  No SI joint pain.  No numbness, tingling, sciatica. Ambulating well.   Skin: no suspicious lesions or rashes  Neurologic: grossly normal CN; normal strength, sensation, & tone  Psychiatric: mentation appears normal and affect normal/bright      "

## 2022-03-18 NOTE — NURSING NOTE
Due to patient being non-English speaking/uses sign language, an  was used for this visit. Only for face-to-face interpretation by an external agency, date and length of interpretation can be found on the scanned worksheet.     name: Calvin Centeno  Agency: [employer]  Language: Nia   Telephone number:   Type of interpretation: Face-to-face, spoken

## 2022-03-18 NOTE — PATIENT INSTRUCTIONS
Thank you for discussing your health today!    We discussed the following at this visit:    1) Back pain. I have sent a referral for physical therapy. I have also sent some tylenol that you can take 1-2 three times a day but no more than 6 pills a day.     Please make an appointment in 1 month to follow up on back pain.    Please call the clinic with any questions or concerns.    Scar Schumacher, DO

## 2022-03-18 NOTE — PROGRESS NOTES
Preceptor Attestation:    I discussed the patient with the resident and evaluated the patient in person. I have verified the content of the note, which accurately reflects my assessment of the patient and the plan of care.   Supervising Physician:  Robert Lee MD.

## 2022-04-18 ENCOUNTER — OFFICE VISIT (OUTPATIENT)
Dept: FAMILY MEDICINE | Facility: CLINIC | Age: 42
End: 2022-04-18
Payer: COMMERCIAL

## 2022-04-18 VITALS
OXYGEN SATURATION: 100 % | RESPIRATION RATE: 16 BRPM | TEMPERATURE: 97.9 F | WEIGHT: 202.2 LBS | SYSTOLIC BLOOD PRESSURE: 127 MMHG | BODY MASS INDEX: 34.39 KG/M2 | HEART RATE: 57 BPM | DIASTOLIC BLOOD PRESSURE: 84 MMHG

## 2022-04-18 DIAGNOSIS — G89.29 CHRONIC BILATERAL LOW BACK PAIN WITHOUT SCIATICA: ICD-10-CM

## 2022-04-18 DIAGNOSIS — M54.50 CHRONIC BILATERAL LOW BACK PAIN WITHOUT SCIATICA: ICD-10-CM

## 2022-04-18 DIAGNOSIS — E66.01 MORBID OBESITY (H): ICD-10-CM

## 2022-04-18 PROCEDURE — 99213 OFFICE O/P EST LOW 20 MIN: CPT | Mod: GC

## 2022-04-18 NOTE — PATIENT INSTRUCTIONS
Thank you for discussing your health today!    We discussed the following at this visit:    1) Back pain. Because you cannot go to therapy. Please follow the resources I have provided for you. Keep taking tylenol for pain because it is the only thing that seems to work well.     Please make an appointment in 3 months to follow up on yearly physical. If your symptoms get worse please call and come back in sooner.     Please call the clinic with any questions or concerns.    Scar Schumacher, DO

## 2022-04-18 NOTE — PROGRESS NOTES
Preceptor Attestation:    I discussed the patient with the resident and evaluated the patient in person. I have verified the content of the note, which accurately reflects my assessment of the patient and the plan of care.   Supervising Physician:  Darryl Daniels MD.

## 2022-04-18 NOTE — NURSING NOTE
Due to patient being non-English speaking/uses sign language, an  was used for this visit. Only for face-to-face interpretation by an external agency, date and length of interpretation can be found on the scanned worksheet.     name: ID [id number]  Agency: [employer]  Language: Nia   Telephone number: [phone]  Type of interpretation: Telephone, spoken

## 2022-04-18 NOTE — PROGRESS NOTES
Assessment & Plan      Chronic bilateral low back pain without sciatica  Chronic back pain 20+ years.  Last visit with referral to physical therapy, patient unable to go she has small kids at home and is unable to get away.  She did report some improvement from a massage.  Overall she has been trying to get moving more.  She is willing to do exercise at home, I have provided her several different exercises for back conditioning.  This time we will have her continue Tylenol as needed and have her follow-up of her pain changes or worsens.  We will have her follow-up in 3 months for back pain as well as her yearly physical.    Return in about 3 months (around 7/18/2022).    Patient was staffed with attending physician Dr. Daniels.    Scar Winsome, DO PGY1    Subjective     Moo Say is a 42 year old year old female w/ PMH of   Patient Active Problem List   Diagnosis     Absolute glaucoma, right eye     Blind right eye     Lung granuloma (H)     Microphthalmia with cataract     Migraine     Language barrier, cultural differences     Betel deposit on teeth     Acute pain of right knee     Pulmonary nodules     Depression     Cholelithiasis     Hepatic steatosis     Gallbladder polyp     Morbid obesity (H)     Chronic bilateral low back pain without sciatica    who presents for the following:      Back pain.  Patient initially presented on 3/18/2022 with me for worsening back pain.  Describes it as initially occurring when she fell from a waterfall in [facility] in 2000.  She had been trying to take Tylenol which slightly improved the pain but without total relief.  Last visit we refrain from new imaging and referred her for physical therapy.  She states that she did not go to therapy but she did get a massage. Today she describes the pain as 3-4/10, but she reports improvement overall since last visit.   Denies numbness, tingling, shooting pains, loss of bladder or bowel function, headache, dizziness, chest pain, shortness of  breath, fevers, chills, nausea, abdominal pain, diarrhea.    [employer]  was used for  this visit.      10 point ROS negative other than as specified above.    Objective   /84 (BP Location: Left arm, Patient Position: Sitting, Cuff Size: Adult Large)   Pulse 57   Temp 97.9  F (36.6  C) (Oral)   Resp 16   Wt 91.7 kg (202 lb 3.2 oz)   LMP 03/08/2022 (Approximate)   SpO2 100%   BMI 34.39 kg/m       Exam:  Constitutional: healthy, alert, no distress, and cooperative  Cardiovascular: RRR w/o audible murmur  Respiratory: bilateral clear lungs w/o wheezing, crackles or rhonchi; breathing comfortably on RA  Gastrointestinal: Abdomen soft, non-tender. BS normal.  Musculoskeletal: Tenderness to palpation L1-L4 and paraspinal tenderness in lumbar region.  No SI joint pain.  No numbness, tingling, sciatica. Ambulating well. Good strength bilateral LE.   Skin: no suspicious lesions or rashes  Neurologic: grossly normal CN; normal strength, sensation, & tone  Psychiatric: mentation appears normal and affect normal/bright

## 2022-05-25 ENCOUNTER — OFFICE VISIT (OUTPATIENT)
Dept: FAMILY MEDICINE | Facility: CLINIC | Age: 42
End: 2022-05-25
Payer: COMMERCIAL

## 2022-05-25 VITALS
SYSTOLIC BLOOD PRESSURE: 118 MMHG | DIASTOLIC BLOOD PRESSURE: 81 MMHG | WEIGHT: 199.2 LBS | HEART RATE: 99 BPM | TEMPERATURE: 98.6 F | OXYGEN SATURATION: 98 % | BODY MASS INDEX: 33.88 KG/M2 | RESPIRATION RATE: 16 BRPM

## 2022-05-25 DIAGNOSIS — H04.123 DRY EYES: Primary | ICD-10-CM

## 2022-05-25 DIAGNOSIS — F33.1 MODERATE EPISODE OF RECURRENT MAJOR DEPRESSIVE DISORDER (H): ICD-10-CM

## 2022-05-25 PROCEDURE — 99214 OFFICE O/P EST MOD 30 MIN: CPT | Mod: GC | Performed by: STUDENT IN AN ORGANIZED HEALTH CARE EDUCATION/TRAINING PROGRAM

## 2022-05-25 RX ORDER — OLOPATADINE HYDROCHLORIDE 1 MG/ML
1 SOLUTION/ DROPS OPHTHALMIC 2 TIMES DAILY
Qty: 5 ML | Refills: 1 | Status: SHIPPED | OUTPATIENT
Start: 2022-05-25 | End: 2022-10-14

## 2022-05-25 ASSESSMENT — PATIENT HEALTH QUESTIONNAIRE - PHQ9: SUM OF ALL RESPONSES TO PHQ QUESTIONS 1-9: 5

## 2022-05-25 NOTE — PROGRESS NOTES
"[facility] Clinic Visit    Assessment & Plan   1. Dry eyes  May be component of allergic, provided both artificial tears and allergy eye drops.   - dextran 70-hypromellose (TEARS NATURALE FREE PF) 0.1-0.3 % ophthalmic solution; Place 1 drop into both eyes daily as needed (dry eyes)  Dispense: 60 each; Refill: 3  - olopatadine (PATANOL) 0.1 % ophthalmic solution; Place 1 drop into both eyes 2 times daily  Dispense: 5 mL; Refill: 1    2. Moderate episode of recurrent major depressive disorder (H)  Restarted today. Will start low dose, see her in 2-4 weeks for follow up. Not interested in therapy right now.   - sertraline (ZOLOFT) 50 MG tablet; Take 1 tablet (50 mg) by mouth daily  Dispense: 30 tablet; Refill: 0       Options for treatment and follow-up care were reviewed with the patient who was engaged and actively involved in the decision making process, verbalized understanding of the options discussed, and satisfied with the final plan.    Patient was staffed with supervising physician, Dr. George.     Gogo Simpson MD, PGY3  [facility]    Subjective   Ed Rangel is a 42 year old female with a history including chronic back pain, obesity, depression, migraine, who presents for follow up of mood and med refill.     Dry eyes - first time she has had this, sometimes itchy. No other allergy symptoms. No dry mouth. She actually has more mucus at night.     PHQ9 Score 5 today. She used to be on a medication for anxiety / getting scared easily, but can't remember what it was. She was started on it after she gave birth. (Has been on setraline the most, but also appears to have been on effexor and elavil in the past). This medication was helpful. She didn't have \"a heavy heart.\" It did seem to make her a little forgetful. She had stopped the med because she didn't know how to get refills. She is eating and sleeping okay. NO SI. She has good support at home. Was in therapy in the past, not " "now. This was helpful. She is not interested in therapy now.       Patient Active Problem List    Diagnosis Date Noted     Chronic bilateral low back pain without sciatica 04/18/2022     Priority: Medium     Morbid obesity (H) 12/29/2021     Priority: Medium     Cholelithiasis 05/18/2021     Priority: Medium     Noted on Ultrasound 5/2021.        Hepatic steatosis 05/18/2021     Priority: Medium     Noted on RUQ Ultrasound 5/2021       Gallbladder polyp 05/18/2021     Priority: Medium     Noted on ultrasound 5/2021.  Patient declined referral to surgery.   Recommend recheck RUQ in 6 months        Depression 03/23/2021     Priority: Medium     Pulmonary nodules 01/30/2020     Priority: Medium     Incidental nodule seen on 1/29/20 CXR. To be discussed with patient at next visit--consider CT vs repeat CXR in 6 months.        Acute pain of right knee 03/01/2019     Priority: Medium     2/26/19 Pt slipped on the ice and fell injuring her rt knee. She was referred to Ortho.  3/26/19 follow-up with Dr Lees.  Pt has chronic patella fracture and he recommends that pt have physical therapy to regain strength in her leg.  Pt does not want surgery and ortho does not feel this would help anyway.       Language barrier, cultural differences 11/26/2018     Priority: Medium     Betel deposit on teeth 11/21/2018     Priority: Medium     11/19/18 Pt reports chewing betel nut 2-3 x's per week.  Discussed importance of cessation during pregnancy.  Pt denied need for assistance with quitting.  Will NEED to reevaluate at future visits.       Migraine      Priority: Medium     Saw Dr. Baker at [facility] on 9/13/2018. Was taking ibuprofen 600 mg q6h but transitioned to PRN 1-2 times a week. Added nortriptyline 10mg qPM.   \"Encouraged patient to stop taking the [place] medication as it is unclear as to what the medication is.\"   DO FLAKO 11/13/18 2:46 PM        Absolute glaucoma, right eye 05/25/2018     Priority: Medium     " Microphthalmia with cataract 05/25/2018     Priority: Medium     Blind right eye 05/07/2018     Priority: Medium     Overview:   Documentation per overseas screening examination.  Please see scanned document.   History of right-sided vision loss since childhood.  Unknown etiology.  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  Patient complains of pain and tearful right eye.  Consultation with ophthalmologist for eye extraction considered at destination country as recommended.     Dr. Baker of [facility] provided ophthalmology referral on 9/13/2018. He noted sporadic use of timolol drops.   GABRIELAKEISHADO 11/13/18 2:47 PM        Lung granuloma (H) 05/07/2018     Priority: Medium     Overview:   Per overseas screening examination.  Please see documentation in scanned documents.  Per chest x-ray right lower lung granuloma November 9, 2016 and July 6, 2017.  AFB smear and cultures collected November 20 2-24, 2016 negative.         Social: She reports that she has never smoked. She uses smokeless tobacco. She reports that she does not drink alcohol and does not use drugs.    Nursing Notes:   Lisa Gonzalez CMA  5/25/2022 11:20 AM  Signed  Due to patient being non-English speaking/uses sign language, an  was used for this visit. Only for face-to-face interpretation by an external agency, date and length of interpretation can be found on the scanned worksheet.     name: Ana Calloway  Agency: [employer]  Language: Nia   Telephone number: [phone]  Type of interpretation: Face-to-face, spoken    Objective     Vitals:    05/25/22 1118   BP: 118/81   BP Location: Left arm   Patient Position: Sitting   Cuff Size: Adult Regular   Pulse: 99   Resp: 16   Temp: 98.6  F (37  C)   TempSrc: Oral   SpO2: 98%   Weight: 90.4 kg (199 lb 3.2 oz)     Body mass index is 33.88 kg/m .    GEN: NAD, healthy, alert  Constitutional: Awake, alert, cooperative, no acute distress, and appears stated  age.  HEENT: NC/AT, EOMI, mildly injected sclera / conjunctiva, mask on  Lungs: No increased WOB.   Musculoskeletal: No redness, warmth, or swelling of the joints. Tone is normal.  Neurologic: A&Ox3.  Cranial nerves II-XII are grossly intact.  Sensory is intact and gait is normal.  Neuropsychiatric: Mildly flat affect, maintains eye contact, well-groomed  Skin: No visible rashes, erythema, pallor, petechia or purpura.

## 2022-05-25 NOTE — NURSING NOTE
Due to patient being non-English speaking/uses sign language, an  was used for this visit. Only for face-to-face interpretation by an external agency, date and length of interpretation can be found on the scanned worksheet.     name: Ana Calloway  Agency: [employer]  Language: Nia   Telephone number: [phone]  Type of interpretation: Face-to-face, spoken

## 2022-06-24 ENCOUNTER — OFFICE VISIT (OUTPATIENT)
Dept: FAMILY MEDICINE | Facility: CLINIC | Age: 42
End: 2022-06-24
Payer: COMMERCIAL

## 2022-06-24 VITALS
OXYGEN SATURATION: 97 % | DIASTOLIC BLOOD PRESSURE: 75 MMHG | HEART RATE: 58 BPM | WEIGHT: 201.6 LBS | RESPIRATION RATE: 18 BRPM | SYSTOLIC BLOOD PRESSURE: 110 MMHG | BODY MASS INDEX: 34.29 KG/M2 | TEMPERATURE: 98 F

## 2022-06-24 DIAGNOSIS — F41.9 ANXIETY: ICD-10-CM

## 2022-06-24 DIAGNOSIS — F33.1 MODERATE EPISODE OF RECURRENT MAJOR DEPRESSIVE DISORDER (H): Primary | ICD-10-CM

## 2022-06-24 DIAGNOSIS — G43.009 MIGRAINE WITHOUT AURA AND WITHOUT STATUS MIGRAINOSUS, NOT INTRACTABLE: ICD-10-CM

## 2022-06-24 PROCEDURE — 99214 OFFICE O/P EST MOD 30 MIN: CPT | Mod: GC | Performed by: STUDENT IN AN ORGANIZED HEALTH CARE EDUCATION/TRAINING PROGRAM

## 2022-06-24 RX ORDER — SUMATRIPTAN 50 MG/1
50 TABLET, FILM COATED ORAL
Qty: 30 TABLET | Refills: 0 | Status: SHIPPED | OUTPATIENT
Start: 2022-06-24 | End: 2022-10-14

## 2022-06-24 RX ORDER — HYDROXYZINE HYDROCHLORIDE 25 MG/1
25 TABLET, FILM COATED ORAL 3 TIMES DAILY PRN
Qty: 90 TABLET | Refills: 4 | Status: SHIPPED | OUTPATIENT
Start: 2022-06-24 | End: 2022-10-14

## 2022-06-24 ASSESSMENT — PATIENT HEALTH QUESTIONNAIRE - PHQ9: SUM OF ALL RESPONSES TO PHQ QUESTIONS 1-9: 4

## 2022-06-24 NOTE — NURSING NOTE
Due to patient being non-English speaking/uses sign language, an  was used for this visit. Only for face-to-face interpretation by an external agency, date and length of interpretation can be found on the scanned worksheet.     name: Ana Calloway  Agency: [employer]  Language: Nia   Telephone number: [phone]  Type of interpretation: Face-to-face, spoken

## 2022-06-24 NOTE — PROGRESS NOTES
Assessment & Plan     Moderate episode of recurrent major depressive disorder (H)  Continue current dose.  Follow-up in 4 weeks.  - sertraline (ZOLOFT) 50 MG tablet; Take 1 tablet (50 mg) by mouth daily    Anxiety  We will try as needed hydroxyzine for panic and anxiety.  - hydrOXYzine (ATARAX) 25 MG tablet; Take 1 tablet (25 mg) by mouth 3 times daily as needed for anxiety    Migraine without aura and without status migrainosus, not intractable  Refill. Medication works well for her.   - SUMAtriptan (IMITREX) 50 MG tablet; Take 1 tablet (50 mg) by mouth at onset of headache for migraine May repeat in 2 hours. Max 4 tablets/24 hours.      Patient Instructions   1. Continue sertraline 50 mg (1 tablet) daily  2. Can use hydroxyzine up to 3 times as needed for anxiety  3. If you are having more than 1 migraine a week, we should talk about using a medication to prevent migraines from happening    Thank you for coming to [facility] for your care. It was a pleasure to take care of you!      Nicky Florez MD         Return in about 4 weeks (around 7/22/2022).    Patient was seen by and discussed with attending physician, Dr. Guidry.       Nicky Florez MD  [facility] CAROLINA Ward is a 42 year old, presenting for the following health issues:  Follow Up (Come here today to follow up from last visit on mood per patient. ) and Medication Reconciliation (Reviewed.  )      HPI     Mood: Has started taking sertraline daily. Does miss 2-3 doses in a week. She is most concerned about her forgetfulness, and feels like the medication makes her more forgetful.     She has used medication for depression and anxiety in the past [place] and it was helpful for her symptoms.     She continues to have episodes of panic, anxiety that is difficult to deal with.    PHQ 3/2/2022 5/25/2022 5/25/2022   PHQ-9 Total Score 5 5 5   Q9: Thoughts of better off  dead/self-harm past 2 weeks Not at all Not at all Not at all     PHQ 3/2/2022 5/25/2022 5/25/2022   PHQ-9 Total Score 5 5 5   Q9: Thoughts of better off dead/self-harm past 2 weeks Not at all Not at all Not at all       Review of Systems   Constitutional, HEENT, cardiovascular, pulmonary, gi and gu systems are negative, except as otherwise noted.      Objective    /75 (BP Location: Right arm, Patient Position: Sitting, Cuff Size: Adult Regular)   Pulse 58   Temp 98  F (36.7  C) (Oral)   Resp 18   Wt 91.4 kg (201 lb 9.6 oz)   SpO2 97%   BMI 34.29 kg/m    Body mass index is 34.29 kg/m .  Physical Exam   GENERAL: alert and no distress  RESP: No increased work of breathing      ----- Service Performed and Documented by Resident or Fellow ------            .  ..

## 2022-07-29 ENCOUNTER — OFFICE VISIT (OUTPATIENT)
Dept: FAMILY MEDICINE | Facility: CLINIC | Age: 42
End: 2022-07-29
Payer: COMMERCIAL

## 2022-07-29 VITALS
RESPIRATION RATE: 16 BRPM | SYSTOLIC BLOOD PRESSURE: 115 MMHG | HEART RATE: 56 BPM | OXYGEN SATURATION: 98 % | DIASTOLIC BLOOD PRESSURE: 80 MMHG | WEIGHT: 201 LBS | TEMPERATURE: 98.1 F | BODY MASS INDEX: 34.19 KG/M2

## 2022-07-29 DIAGNOSIS — F33.1 MODERATE EPISODE OF RECURRENT MAJOR DEPRESSIVE DISORDER (H): Primary | ICD-10-CM

## 2022-07-29 DIAGNOSIS — G43.009 MIGRAINE WITHOUT AURA AND WITHOUT STATUS MIGRAINOSUS, NOT INTRACTABLE: ICD-10-CM

## 2022-07-29 PROCEDURE — 99214 OFFICE O/P EST MOD 30 MIN: CPT | Mod: GC

## 2022-07-29 ASSESSMENT — ANXIETY QUESTIONNAIRES
2. NOT BEING ABLE TO STOP OR CONTROL WORRYING: NOT AT ALL
2. NOT BEING ABLE TO STOP OR CONTROL WORRYING: NOT AT ALL
GAD7 TOTAL SCORE: 1
3. WORRYING TOO MUCH ABOUT DIFFERENT THINGS: NOT AT ALL
GAD7 TOTAL SCORE: 1
5. BEING SO RESTLESS THAT IT IS HARD TO SIT STILL: NOT AT ALL
1. FEELING NERVOUS, ANXIOUS, OR ON EDGE: SEVERAL DAYS
5. BEING SO RESTLESS THAT IT IS HARD TO SIT STILL: NOT AT ALL
3. WORRYING TOO MUCH ABOUT DIFFERENT THINGS: NOT AT ALL
1. FEELING NERVOUS, ANXIOUS, OR ON EDGE: SEVERAL DAYS
6. BECOMING EASILY ANNOYED OR IRRITABLE: NOT AT ALL
1. FEELING NERVOUS, ANXIOUS, OR ON EDGE: SEVERAL DAYS
7. FEELING AFRAID AS IF SOMETHING AWFUL MIGHT HAPPEN: NOT AT ALL
IF YOU CHECKED OFF ANY PROBLEMS ON THIS QUESTIONNAIRE, HOW DIFFICULT HAVE THESE PROBLEMS MADE IT FOR YOU TO DO YOUR WORK, TAKE CARE OF THINGS AT HOME, OR GET ALONG WITH OTHER PEOPLE: SOMEWHAT DIFFICULT
GAD7 TOTAL SCORE: 1
6. BECOMING EASILY ANNOYED OR IRRITABLE: NOT AT ALL
3. WORRYING TOO MUCH ABOUT DIFFERENT THINGS: NOT AT ALL
5. BEING SO RESTLESS THAT IT IS HARD TO SIT STILL: NOT AT ALL
GAD7 TOTAL SCORE: 1
2. NOT BEING ABLE TO STOP OR CONTROL WORRYING: NOT AT ALL
7. FEELING AFRAID AS IF SOMETHING AWFUL MIGHT HAPPEN: NOT AT ALL
IF YOU CHECKED OFF ANY PROBLEMS ON THIS QUESTIONNAIRE, HOW DIFFICULT HAVE THESE PROBLEMS MADE IT FOR YOU TO DO YOUR WORK, TAKE CARE OF THINGS AT HOME, OR GET ALONG WITH OTHER PEOPLE: SOMEWHAT DIFFICULT
IF YOU CHECKED OFF ANY PROBLEMS ON THIS QUESTIONNAIRE, HOW DIFFICULT HAVE THESE PROBLEMS MADE IT FOR YOU TO DO YOUR WORK, TAKE CARE OF THINGS AT HOME, OR GET ALONG WITH OTHER PEOPLE: SOMEWHAT DIFFICULT
7. FEELING AFRAID AS IF SOMETHING AWFUL MIGHT HAPPEN: NOT AT ALL
6. BECOMING EASILY ANNOYED OR IRRITABLE: NOT AT ALL

## 2022-07-29 ASSESSMENT — PATIENT HEALTH QUESTIONNAIRE - PHQ9
5. POOR APPETITE OR OVEREATING: NOT AT ALL
SUM OF ALL RESPONSES TO PHQ QUESTIONS 1-9: 4

## 2022-07-29 NOTE — PATIENT INSTRUCTIONS
Thank you for taking the time to discuss your health with me today!    Today we discussed:  Your increasing anxiety. We will increase your Sertraline to 100 mg per day.  2.   Follow up in Clinic in 3 weeks.     As always, please call the clinic or message with any questions or concerns.     Best Wishes,  Puja Guerrero MD.

## 2022-07-29 NOTE — NURSING NOTE
Due to patient being non-English speaking/uses sign language, an  was used for this visit. Only for face-to-face interpretation by an external agency, date and length of interpretation can be found on the scanned worksheet.     name: Ana Calloway  Agency: [employer]  Language: Nia   Telephone number: [phone]  Type of interpretation: Face-to-face, spoken

## 2022-07-29 NOTE — PROGRESS NOTES
Preceptor Attestation:  Vitals:    07/29/22 0811   BP: 115/80   Pulse: 56   Resp: 16   Temp: 98.1  F (36.7  C)   SpO2: 98%   Weight: 91.2 kg (201 lb)          I discussed the patient with the resident and evaluated the patient in person. I have verified the content of the note, which accurately reflects my assessment of the patient and the plan of care.   Supervising Physician:  Abigail Lal MD

## 2022-07-29 NOTE — PROGRESS NOTES
Assessment & Plan:    1. Follow Up Depression/Anxiety.   PHQ-9 score 4 today. Denies suicidal thoughts or intent. Underline depression and anxiety likely compounded by the situation she is in with caring for her developmentally delayed daughter and struggling to get her the correct resources.  - Increase Zoloft to 100 mg daily  - Consider starting Buspar in the future if additional anxiety management is needed  - Follow up in 2 weeks     Subjective   Ed is a 42 year old hx of migraine, depression, anxiety, who presents for mood follow up. She has been feeling more anxious and tired lately. She feels this is likely related to her daughter with developmental delay and the amount of work it takes to care for her. She feels her anxiety was better controlled when she was on a higher dose of Zoloft. She has been trying Hydroxizine PRN but finds it minimally helpful.    Depression (Followup depression)      Review of Systems   Constitutional, HEENT, cardiovascular, pulmonary, gi and gu systems are negative, except as otherwise noted.      Objective    /80   Pulse 56   Temp 98.1  F (36.7  C)   Resp 16   Wt 91.2 kg (201 lb)   SpO2 98%   BMI 34.19 kg/m    Body mass index is 34.19 kg/m .  Physical Exam   GENERAL: healthy, alert and no distress  RESP: lungs clear to auscultation - no rales, rhonchi or wheezes  CV: Regular rate, rhythm  ABDOMEN: soft, nontender, no hepatosplenomegaly, no masses and bowel sounds normal  MS: no gross musculoskeletal defects noted, no edema  PSYCH: mentation appears normal, affect normal/bright, appears tired but engaged and making eye contract    I precepted today with Dr. Lal.     Puja Guerrero MD PGY-2            .  ..

## 2022-08-12 ENCOUNTER — OFFICE VISIT (OUTPATIENT)
Dept: FAMILY MEDICINE | Facility: CLINIC | Age: 42
End: 2022-08-12
Payer: COMMERCIAL

## 2022-08-12 VITALS
OXYGEN SATURATION: 96 % | RESPIRATION RATE: 16 BRPM | BODY MASS INDEX: 33.92 KG/M2 | SYSTOLIC BLOOD PRESSURE: 105 MMHG | WEIGHT: 199.4 LBS | TEMPERATURE: 97.9 F | DIASTOLIC BLOOD PRESSURE: 76 MMHG | HEART RATE: 56 BPM

## 2022-08-12 DIAGNOSIS — F33.1 MODERATE EPISODE OF RECURRENT MAJOR DEPRESSIVE DISORDER (H): Primary | ICD-10-CM

## 2022-08-12 DIAGNOSIS — F41.9 ANXIETY: ICD-10-CM

## 2022-08-12 PROCEDURE — 99213 OFFICE O/P EST LOW 20 MIN: CPT | Mod: GC

## 2022-08-12 ASSESSMENT — PATIENT HEALTH QUESTIONNAIRE - PHQ9: SUM OF ALL RESPONSES TO PHQ QUESTIONS 1-9: 4

## 2022-08-12 NOTE — PATIENT INSTRUCTIONS
Thank you for taking the time to discuss your health with me today!    Today we discussed:  Following up regarding increasing her anxiety medication at her last visit.  I am glad you are feeling better and have not been having a lot of side effects from the medication.  Please follow-up in 3 to 6 months to check in on your mood or if earlier if you are having side effects or other concerns.     As always, please call the clinic or message with any questions or concerns.     Best Wishes,  Puja Guerrero MD.

## 2022-08-12 NOTE — PROGRESS NOTES
Preceptor Attestation:    I discussed the patient with the resident and evaluated the patient in person. I have verified the content of the note, which accurately reflects my assessment of the patient and the plan of care.   Supervising Physician:  Michael Rankin MD.

## 2022-08-12 NOTE — PROGRESS NOTES
"  Assessment & Plan     Moderate episode of recurrent major depressive disorder (H)  Anxiety  Ed lala is a 42 year old females with a PMH of anxiety, depression, and migraine, coming in to clinic for a depression follow up. Was seen two weeks prior for increased anxiety and tiredness, her sertraline was increased from 50 mg to 100mg daily. Appears to be tolerating her increased sertraline dosage well with no associated side effects. Her anxiety has decreased since increasing the sertraline. PHQ9 stable at 4. No suicidal ideation at today's visit.   - Continue Sertraline 100 mg daily  - Follow up in 3-6 months   - Consider referral to [facility] for possible phone/in home counseling if patient was interested    BMI:   Estimated body mass index is 33.92 kg/m  as calculated from the following:    Height as of 3/18/22: 1.633 m (5' 4.29\").    Weight as of this encounter: 90.4 kg (199 lb 6.4 oz).     Return in about 3 months (around 11/12/2022).    Nico Kimbrough MS3, [facility]     I was present with the medical student, Nico Kimbrough MS3, who participated in the service and the documentation of the note.  I have verified the history and personally performed a physical exam and medical decision making, and have verified the content of the note.  I agree with the assessment and plan of care as documented in the note.    Puja Geurrero MD PGY-2  [facility]  8/12/2022      Mae Ward is a 42 year old accompanied by her daughters and , presenting for the following health issues:  Other (F/U mood. No other concern. )      MARKO lala is a 42 year old females with a PMH of anxiety, depression, and migraine, coming in to clinic for a depression follow up. She was seen here two weeks ago, and was endorsing increased anxiety and tiredness, PHQ9 was 4 at the time. Her sertraline was increased from 50 mg to 100 mg daily. She has tolerated this increase well with no " complaints or noted side effects. She is feeling better today, with less anxiety. Her PHQ9 is stable at 4 today. Much of her anxiety is related to taking care of her daughter with developmental delay.      Review of Systems   Constitutional, HEENT, cardiovascular, pulmonary, gi and gu systems are negative, except as otherwise noted.      Objective    /76   Pulse 56   Temp 97.9  F (36.6  C) (Oral)   Resp 16   Wt 90.4 kg (199 lb 6.4 oz)   LMP  (LMP Unknown)   SpO2 96%   BMI 33.92 kg/m    Body mass index is 33.92 kg/m .  Physical Exam   GENERAL: healthy, alert and no distress, appears tired.   RESP: lungs clear to auscultation - no rales, rhonchi or wheezes  CV: regular rate and rhythm, normal S1 S2, no S3 or S4, no murmur, click or rub, no peripheral edema and peripheral pulses strong  ABDOMEN: soft, nontender, no hepatosplenomegaly, no masses and bowel sounds normal  MS: no gross musculoskeletal defects noted, no edema        ----- Services Performed by a MEDICAL STUDENT in Presence of RESIDENT/FELLOW Physician-------            .  ..

## 2022-08-12 NOTE — NURSING NOTE
Due to patient being non-English speaking/uses sign language, an  was used for this visit. Only for face-to-face interpretation by an external agency, date and length of interpretation can be found on the scanned worksheet.     name: Caesar Corado  Agency: [employer]  Language: Nia   Telephone number:   Type of interpretation: Group, spoken; number of participants: 2

## 2022-10-14 ENCOUNTER — OFFICE VISIT (OUTPATIENT)
Dept: FAMILY MEDICINE | Facility: CLINIC | Age: 42
End: 2022-10-14
Payer: COMMERCIAL

## 2022-10-14 VITALS
SYSTOLIC BLOOD PRESSURE: 103 MMHG | TEMPERATURE: 97 F | BODY MASS INDEX: 34.22 KG/M2 | DIASTOLIC BLOOD PRESSURE: 71 MMHG | RESPIRATION RATE: 20 BRPM | OXYGEN SATURATION: 97 % | HEART RATE: 54 BPM | WEIGHT: 201.2 LBS

## 2022-10-14 DIAGNOSIS — F33.1 MODERATE EPISODE OF RECURRENT MAJOR DEPRESSIVE DISORDER (H): Primary | ICD-10-CM

## 2022-10-14 DIAGNOSIS — R10.12 ABDOMINAL PAIN, LEFT UPPER QUADRANT: ICD-10-CM

## 2022-10-14 DIAGNOSIS — F41.9 ANXIETY: ICD-10-CM

## 2022-10-14 DIAGNOSIS — G43.009 MIGRAINE WITHOUT AURA AND WITHOUT STATUS MIGRAINOSUS, NOT INTRACTABLE: ICD-10-CM

## 2022-10-14 PROCEDURE — 90471 IMMUNIZATION ADMIN: CPT

## 2022-10-14 PROCEDURE — 90686 IIV4 VACC NO PRSV 0.5 ML IM: CPT

## 2022-10-14 PROCEDURE — 99214 OFFICE O/P EST MOD 30 MIN: CPT | Mod: 25

## 2022-10-14 RX ORDER — HYDROXYZINE HYDROCHLORIDE 25 MG/1
25 TABLET, FILM COATED ORAL 3 TIMES DAILY PRN
Qty: 90 TABLET | Refills: 4 | Status: SHIPPED | OUTPATIENT
Start: 2022-10-14 | End: 2023-02-07

## 2022-10-14 RX ORDER — FAMOTIDINE 20 MG/1
20 TABLET, FILM COATED ORAL 2 TIMES DAILY
Qty: 180 TABLET | Refills: 3 | Status: SHIPPED | OUTPATIENT
Start: 2022-10-14 | End: 2023-12-07

## 2022-10-14 RX ORDER — SUMATRIPTAN 50 MG/1
50 TABLET, FILM COATED ORAL
Qty: 30 TABLET | Refills: 0 | Status: SHIPPED | OUTPATIENT
Start: 2022-10-14 | End: 2022-12-22

## 2022-10-14 ASSESSMENT — PATIENT HEALTH QUESTIONNAIRE - PHQ9: SUM OF ALL RESPONSES TO PHQ QUESTIONS 1-9: 8

## 2022-10-14 NOTE — PROGRESS NOTES
Preceptor attestation:  Vital signs reviewed: /71   Pulse 54   Temp 97  F (36.1  C) (Oral)   Resp 20   Wt 91.3 kg (201 lb 3.2 oz)   SpO2 97%   BMI 34.22 kg/m      Patient seen, evaluated, and discussed with the resident.  I have verified the content of the note, which accurately reflects my assessment of the patient and the plan of care.    Supervising physician: Barbara Wright MD  [facility]

## 2022-10-14 NOTE — PROGRESS NOTES
Assessment & Plan:    1. Depression  Mood follow up   PHQ-9 score 11 today up from 4. On exam, she appears more energetic, alert and engaged. She reports her mood is much better with getting sleep, now that her daughter is sleeping better and in school 3 days per week. She never increased her Sertraline and has been taking 50 mg daily but occasionally forgets.   - Continue Sertraline 50 mg daily  - Follow up in 3 months    2. Abdominal pain  Left sided intermittently present. Describes as an aching after she eats. This could be gastritis, indigestion, ulcer, vs other. Will check H pylori and treat with Famotidine and see if symptoms improve. Denies red flag symptoms including weight loss, fever, n/v/d/constipation, no blood in stool or black stools. No urinary symptoms.   - H pylori   - Famotidine 20 mg twice daily  - Follow up in 1-2 months or earlier if symptoms worsen    Subjective   Moo is a 42 year old with hx of migraine, depression, who is presenting for follow up on her depression and abdominal pan.   Patient reports her mood, depression has been much improved over the last 1 to 2 months.  Her sleep is improved now that her daughter is sleeping better.  She has more time for herself since her daughter is attending school 3 days/week, she is getting some rest.  She feels she has more energy and feels less fatigued.  She reports she is taking her sertraline 50 mg daily and never increased to 2 tabs because she forgot.  Patient reports her abdominal pain is located on the left side, describes it as a dull aching pain primarily after eating, gets better if she eats small meals.  She has tried over-the-counter antacids with minimal relief.  She denies fever, weakness, chills, chest pain, shortness of breath, nausea, vomiting, diarrhea or constipation.  Denies urinary symptoms.      RECHECK (Follow up mood)        Review of Systems   Constitutional, HEENT, cardiovascular, pulmonary, gi and gu systems are negative,  except as otherwise noted.      Objective    /71   Pulse 54   Temp 97  F (36.1  C) (Oral)   Resp 20   Wt 91.3 kg (201 lb 3.2 oz)   SpO2 97%   BMI 34.22 kg/m    Body mass index is 34.22 kg/m .  Physical Exam   GENERAL: healthy, alert and no distress  RESP: lungs clear to auscultation - no rales, rhonchi or wheezes  CV: regular rate and rhythm, normal S1 S2, no S3 or S4, no murmur, click or rub, no peripheral edema and peripheral pulses strong  ABDOMEN: soft, nontender, no hepatosplenomegaly, no masses and bowel sounds normal  MS: no gross musculoskeletal defects noted, no edema    I precepted today with Dr. Wright.    Puja Guerrero MD PGY-2        [facility] :  PHQ-9 Screening Note  SITUATION/BACKGROUND                                                    Ed Rangel is a 42 year old female who completed the PHQ-9 assessment for depression and Score is >9. English is second language. She appears very well compared to previous visits on exam and endorses improvement in her mood and sleep. Question accuracy of previous screening or present screening due to cognition and memory issues.     Onset of symptoms: stable, feels improved from previous  Trigger:   Recent related events: Doing better, daughter with ASD in school, sleeping better  Prior history of suicide attempt or self harm: No  Risk Factors: No  History of depression or mental illness: Yes  Medications reviewed: Yes     ASSESSMENT      A. Are any of the following present?      Suicidal thoughts with a plan and means to carry out the plan?    Intent to harm others    Altered mental status: confusion, delusional, psychotic NO - go to B   B. Are any of the following present?      Suicidal thoughts without a plan or means to carry out the plan    New onset of delusional ideas    Past inpatient admission for depression    New onset and recent change or addition of new medication NO - go to C   C. Are any of the following present?      Previous  suicide attempts    Depression interfering with ability to work or function    Loss of appetite and eating poorly    Abrupt cessation of drugs (OTC or RX), alcohol or caffeine    Drug or alcohol abuse NO - go to D   D. Are several of the following present?      Difficulty concentrating    Difficulty sleeping    Reduced interest in sexual activity or impotency    Irregular or absent menstruation    No interest in activity    Change in interpersonal relationships    Increased use/abuse of alcohol or drugs    Pregnant or recent child birth    Recent major life change    History of depression NO - provide home care instructions         PLAN      Home Care Instructions:   East a well-balanced diet, drink plenty of fluids and rest as needed  PH!-9 likely not accurate given ESL and reporting significant improvement in symtpoms and looks better on exam.    Report the following to your PCP:   Suicidal thoughts without a plan or means to carry out the plan  Depression interferes with daily activities  Persistent inability to sleep    Seek emergency care immediately if any of the following occur:   Suicidal thoughts and plan and means to carry out the plan  Injury to self or others  Altered mental status:  Confusion, Delusional, Pyschotic    BEHAVIORAL HEALTH TEAMS      [facility] [place]    [facility] [place] Pager: [phone]    [place]  - Behavioral Health Team    Pager number: [phone]    Referral to Behavioral Health    BEHAVIORAL / SPIRITUAL HEALTH SOWQ [26383}    RESOURCES      - 24/7 Crisis Hotlines: National Suicide Prevention Hotline  287-318-MOIJ (9512)  - Crisis Hotlines by [facility]:  [employer] [facility]: [phone]    Puja Guerrero MD

## 2022-10-14 NOTE — NURSING NOTE
PHQ 7/29/2022 8/12/2022 10/14/2022   PHQ-9 Total Score 4 4 8   Q9: Thoughts of better off dead/self-harm past 2 weeks Not at all Not at all Not at all

## 2022-10-14 NOTE — PATIENT INSTRUCTIONS
Thank you for taking the time to discuss your health with me today!    Today we discussed:  Your mood seems to be doing well, lets continue sertraline 1 tablet daily.  Follow-up in 2 to 3 months.  2.   Regarding your abdominal pain, we will test for infection and please start famotidine 1 tablet twice daily.  Follow-up in 2 to 3 months or if the pain worsens, follow-up earlier    As always, please call the clinic or message with any questions or concerns.     Best Wishes,  Puja Guerrero MD.

## 2022-10-17 PROCEDURE — 87338 HPYLORI STOOL AG IA: CPT

## 2022-10-18 LAB — H PYLORI AG STL QL IA: POSITIVE

## 2022-10-20 DIAGNOSIS — A04.8 H. PYLORI INFECTION: Primary | ICD-10-CM

## 2022-10-20 RX ORDER — AMOXICILLIN 500 MG/1
1000 CAPSULE ORAL 2 TIMES DAILY
Qty: 56 CAPSULE | Refills: 0 | Status: SHIPPED | OUTPATIENT
Start: 2022-10-20 | End: 2022-11-03

## 2022-10-20 RX ORDER — CLARITHROMYCIN 500 MG
500 TABLET ORAL 2 TIMES DAILY
Qty: 28 TABLET | Refills: 0 | Status: SHIPPED | OUTPATIENT
Start: 2022-10-20 | End: 2022-11-03

## 2022-10-23 NOTE — PROGRESS NOTES
Medication Therapy Management (MTM) Encounter    ASSESSMENT:                            Medication Adherence/Access: Assisted patient with setting up an alarm on her phone to go off at 10am and again at 9pm to be her reminder to take her medications. Pillbox also set up for her H Pylori medications.     GERD/H Pylori: Patient to start 14-day course of medications for H. Pylori infection. 14-day pillbox set up for twice daily medications. Patient educated on importance of taking all medications as prescribed and the potential side effects. She understands how to use the pillbox and will call the clinic if she develops intolerable side effects. Patient to stop taking famotidine during H Pylori treatment (AVS printed off and her child who reads English will show the patient which medication to hold).    Depreession: Continue all medications as prescribed, at next visit, reassess if patient needs to increase dose of sertraline.     Migraines: controlled, continue all medications as prescribed. Reassess at next visit need for prophylaxis.     PLAN:                            1. Take medications twice daily (using pillbox which was set up at visit today) for 14 days  2. STOP famotidine   3. Bring all medications to next visit       Medbox Set up x 2 weeks:     AM PM    Monday amoxicillin 500mg  x2, clarithromycin 500mg x1, omeprazole 20mg x1 amoxicillin 500mg  x2, clarithromycin 500mg x1, omeprazole 20mg x1   Tuesday amoxicillin 500mg  x2, clarithromycin 500mg x1, omeprazole 20mg x1 amoxicillin 500mg  x2, clarithromycin 500mg x1, omeprazole 20mg x1   Wednesday amoxicillin 500mg  x2, clarithromycin 500mg x1, omeprazole 20mg x1 amoxicillin 500mg  x2, clarithromycin 500mg x1, omeprazole 20mg x1   Thrusday amoxicillin 500mg  x2, clarithromycin 500mg x1, omeprazole 20mg x1 amoxicillin 500mg  x2, clarithromycin 500mg x1, omeprazole 20mg x1   Friday amoxicillin 500mg  x2, clarithromycin 500mg x1, omeprazole 20mg x1 amoxicillin  500mg  x2, clarithromycin 500mg x1, omeprazole 20mg x1   Saturday amoxicillin 500mg  x2, clarithromycin 500mg x1, omeprazole 20mg x1 amoxicillin 500mg  x2, clarithromycin 500mg x1, omeprazole 20mg x1   Sunday amoxicillin 500mg  x2, clarithromycin 500mg x1, omeprazole 20mg x1 amoxicillin 500mg  x2, clarithromycin 500mg x1, omeprazole 20mg x1       Follow-up: Return in 2 weeks (on 11/7/2022) for Follow up, with me, in person.    SUBJECTIVE/OBJECTIVE:                          Ed Rangel is a 42 year old female coming in for an initial visit. She was referred to me from Puja Guerrero MD. Patient seen with Nia gacria.     Reason for visit: H Pylori & MTM.    Allergies/ADRs: Reviewed in chart  Past Medical History: Reviewed in chart  Tobacco: She reports that she has never smoked. She uses smokeless tobacco.   Alcohol: none  Caffine: coffee    Medication Adherence/Access: Patient often forgets to take her medications but is unable to verbalize how many days she misses doses. She does not know medication names. She takes all medications out fo the vials and reports her family will sometimes remind her and she will accidentally take it more than once a day if more than one person reminds her to take them.     GERD/H Pylori: Amoxicillin 500mg 2 tabs twice daily, clarithromycin 500mg twice daily, omeprazole 20mg twice daily (diagnoised 10/20/22) also prescribed famotidine 20mg twice daily   - Has not yet started H pylori therapy but has all prescription bottles with her today   - Does take famotidine but unable to verbalize how many times a day    Depression: Sertraline 50mg daily, hydroxyzine 25 mg three times daily as needed  - Thinks she is taking sertraline twice daily but she then mentions she may be mixing this up with her famotidine  PHQ 7/29/2022 8/12/2022 10/14/2022   PHQ-9 Total Score 4 4 8   Q9: Thoughts of better off dead/self-harm past 2 weeks Not at all Not at all Not at all     Migraines: sumatriptan 50mg  at onset of headache, can repeat after 2 hours  - Uses once weekly which she finds helpful    - Is not able to state how many migraines she has a month/week      Today's Vitals: /73   Pulse 58   Temp 98  F (36.7  C) (Oral)   Resp 20   SpO2 98%   ----------------      I spent 30 minutes with this patient today. All changes were made via verbal approval with Dr. Guerrero. A copy of the visit note was provided to the patient's provider(s).    The patient was given a summary of these recommendations.     Mira Moncada, Pharm.D., MPH  MTM Pharmacist Resident     I have verified the content of the note, which accurately reflects my assessment of the patient and the plan of care.   Isis Shay, [facility], PharmD     Medication Therapy Recommendations  Gastric ulcer due to Helicobacter pylori, acute    Rationale: Patient forgets to take - Adherence - Adherence   Recommendation: Provide Adherence Intervention   Status: Patient Agreed - Adherence/Education

## 2022-10-24 ENCOUNTER — OFFICE VISIT (OUTPATIENT)
Dept: PHARMACY | Facility: CLINIC | Age: 42
End: 2022-10-24
Payer: COMMERCIAL

## 2022-10-24 VITALS
TEMPERATURE: 98 F | RESPIRATION RATE: 20 BRPM | SYSTOLIC BLOOD PRESSURE: 110 MMHG | HEART RATE: 58 BPM | DIASTOLIC BLOOD PRESSURE: 73 MMHG | OXYGEN SATURATION: 98 %

## 2022-10-24 DIAGNOSIS — K25.3 GASTRIC ULCER DUE TO HELICOBACTER PYLORI, ACUTE: Primary | ICD-10-CM

## 2022-10-24 DIAGNOSIS — G43.009 MIGRAINE WITHOUT AURA AND WITHOUT STATUS MIGRAINOSUS, NOT INTRACTABLE: ICD-10-CM

## 2022-10-24 DIAGNOSIS — B96.81 GASTRIC ULCER DUE TO HELICOBACTER PYLORI, ACUTE: Primary | ICD-10-CM

## 2022-10-24 DIAGNOSIS — F33.1 MODERATE EPISODE OF RECURRENT MAJOR DEPRESSIVE DISORDER (H): ICD-10-CM

## 2022-10-24 PROCEDURE — 99607 MTMS BY PHARM ADDL 15 MIN: CPT | Performed by: PHARMACIST

## 2022-10-24 PROCEDURE — 99605 MTMS BY PHARM NP 15 MIN: CPT | Performed by: PHARMACIST

## 2022-10-24 NOTE — PATIENT INSTRUCTIONS
PLAN:                          1. Take medications twice daily (using pillbox which was set up at visit today) for 14 days  2. STOP famotidine   3. Bring all medications to next visit (11/7/22 at 10:30am)

## 2022-10-24 NOTE — NURSING NOTE
Due to patient being non-English speaking/uses sign language, an  was used for this visit. Only for face-to-face interpretation by an external agency, date and length of interpretation can be found on the scanned worksheet.     name: aly summers          ID:[id number]  Agency: [facility] [employer] Phone Interpreting  Language: Nia   Telephone number: [phone]  Type of interpretation: Telephone, spoken

## 2022-12-22 ENCOUNTER — OFFICE VISIT (OUTPATIENT)
Dept: FAMILY MEDICINE | Facility: CLINIC | Age: 42
End: 2022-12-22
Payer: COMMERCIAL

## 2022-12-22 VITALS
BODY MASS INDEX: 34.66 KG/M2 | HEIGHT: 64 IN | WEIGHT: 203 LBS | DIASTOLIC BLOOD PRESSURE: 71 MMHG | OXYGEN SATURATION: 98 % | SYSTOLIC BLOOD PRESSURE: 105 MMHG | TEMPERATURE: 98.1 F | RESPIRATION RATE: 16 BRPM | HEART RATE: 58 BPM

## 2022-12-22 DIAGNOSIS — G43.009 MIGRAINE WITHOUT AURA AND WITHOUT STATUS MIGRAINOSUS, NOT INTRACTABLE: ICD-10-CM

## 2022-12-22 DIAGNOSIS — G89.29 CHRONIC BILATERAL LOW BACK PAIN WITHOUT SCIATICA: Primary | ICD-10-CM

## 2022-12-22 DIAGNOSIS — M54.50 CHRONIC BILATERAL LOW BACK PAIN WITHOUT SCIATICA: Primary | ICD-10-CM

## 2022-12-22 PROCEDURE — 99214 OFFICE O/P EST MOD 30 MIN: CPT | Mod: GC

## 2022-12-22 RX ORDER — ACETAMINOPHEN 325 MG/1
325-650 TABLET ORAL EVERY 6 HOURS PRN
Qty: 90 TABLET | Refills: 1 | Status: SHIPPED | OUTPATIENT
Start: 2022-12-22 | End: 2023-02-07

## 2022-12-22 RX ORDER — SUMATRIPTAN 50 MG/1
50 TABLET, FILM COATED ORAL
Qty: 30 TABLET | Refills: 1 | Status: SHIPPED | OUTPATIENT
Start: 2022-12-22 | End: 2023-02-07

## 2022-12-22 ASSESSMENT — ANXIETY QUESTIONNAIRES
1. FEELING NERVOUS, ANXIOUS, OR ON EDGE: SEVERAL DAYS
IF YOU CHECKED OFF ANY PROBLEMS ON THIS QUESTIONNAIRE, HOW DIFFICULT HAVE THESE PROBLEMS MADE IT FOR YOU TO DO YOUR WORK, TAKE CARE OF THINGS AT HOME, OR GET ALONG WITH OTHER PEOPLE: SOMEWHAT DIFFICULT
5. BEING SO RESTLESS THAT IT IS HARD TO SIT STILL: NOT AT ALL
7. FEELING AFRAID AS IF SOMETHING AWFUL MIGHT HAPPEN: NOT AT ALL
GAD7 TOTAL SCORE: 2
2. NOT BEING ABLE TO STOP OR CONTROL WORRYING: NOT AT ALL
3. WORRYING TOO MUCH ABOUT DIFFERENT THINGS: NOT AT ALL
GAD7 TOTAL SCORE: 2
6. BECOMING EASILY ANNOYED OR IRRITABLE: NOT AT ALL

## 2022-12-22 ASSESSMENT — PATIENT HEALTH QUESTIONNAIRE - PHQ9
SUM OF ALL RESPONSES TO PHQ QUESTIONS 1-9: 4
5. POOR APPETITE OR OVEREATING: SEVERAL DAYS

## 2022-12-22 NOTE — PROGRESS NOTES
"  Assessment & Plan     Migraine without aura and without status migrainosus, not intractable  Patient in for headache for 2 days duration, although reports that she has chronic pounding, unilateral headaches with some blurry vision and associated nausea.  Previously prescribed sumatriptan, unfortunately which the patient ran out of recently.  No neuro symptoms today.  No fevers, chills.  Believe this to be continued migraine headaches and will prescribe her sumatriptan, patient will call or follow-up if headaches continue to worsen.  - SUMAtriptan (IMITREX) 50 MG tablet; Take 1 tablet (50 mg) by mouth at onset of headache for migraine May repeat in 2 hours. Max 4 tablets/24 hours.    Chronic bilateral low back pain without sciatica  Chronic low back pain for 1 year duration after a fall.  Patient treating with Tylenol which provide some relief.  Exam fairly unremarkable without neurologic symptoms, sciatica, radiating pain.  No step-offs, pinpoint tenderness.  No systemic symptoms or urinary or GI complaints.  Believe this to be continued musculoskeletal pain.  Offered patient physical therapy, which she declined as she says that she has no time.  Provided exercises and recommended patient come back in 1 month.  - acetaminophen (TYLENOL) 325 MG tablet; Take 1-2 tablets (325-650 mg) by mouth every 6 hours as needed for mild pain    Return in about 1 month (around 1/22/2023).    Scar Schumacher [facility]   Ed is a 42 year old presenting for the following health issues:  Back Pain and Headache    HPI     Concern - Headache  Onset: 2 days, chronic.   Description: Achy pain \"all over head\". History of migraine headaches. Some blurry vision  Intensity: 8-9/10  Progression of Symptoms:  same and waxing and waning  Accompanying Signs & Symptoms: Blurry vision. Some N/V  Previous history of similar problem: Yes, history of migraine headaches.   Precipitating factors:        Worsened " "by: Unsure  Alleviating factors:        Improved by: Sumatriptan, but ran out  Therapies tried and outcome: None as ran out of sumatriptan.   Denies dizziness, chest pain, shortness of breath, fevers, chills, nausea, abdominal pain, diarrhea.    Pain History:  When did you first notice your pain? - Chronic back pain.   Have you seen anyone else for your pain? Yes   Where in your body do you have pain? Back Pain  Onset/Duration: Long time  Description:   Location of pain: low back bilateral  Character of pain: dull ache and waxing and waning. When worsening sharp.   Pain radiation: none  New numbness or weakness in legs, not attributed to pain: no   Intensity: Currently 5/10  Progression of Symptoms: waxing and waning  History:   Specific cause: Fall one year ago.   Any previous MRI or X-rays: None  Sees a specialist for back pain: No  Alleviating factors:   Improved by: acetaminophen (Tylenol)    Precipitating factors:  Worsened by: Worse in morning after sleep. Laying down.   Therapies tried and outcome: acetaminophen (Tylenol)    Accompanying Signs & Symptoms:  Risk of Fracture: None  Risk of Cauda Equina: None  Risk of Infection: None  Risk of Cancer: None  Risk of Ankylosing Spondylitis: Onset at age <35, male, AND morning back stiffness No    Review of Systems   Constitutional, HEENT, cardiovascular, pulmonary, gi and gu systems are negative, except as otherwise noted.      Objective    /71   Pulse 58   Temp 98.1  F (36.7  C) (Oral)   Resp 16   Ht 1.619 m (5' 3.75\")   Wt 92.1 kg (203 lb)   LMP  (LMP Unknown)   SpO2 98%   BMI 35.12 kg/m    Body mass index is 35.12 kg/m .  Physical Exam   General: alert, appears comfortable, no acute distress  HEENT: atraumatic, conjunctiva clear without erythema, EOM's intact, no nasal discharge  Neck: supple  Cardiac: RRR w/o audible murmur  Resp: bilateral clear lungs w/o wheezing, crackles or rhonchi; breathing comfortably on RA  Abdomen: soft, non-tender to " palpation, no masses. BS normal  Extremities: no peripheral edema  Skin: no rashes or suspicious legions on exposed skin  Neuro: grossly normal CN; normal strength, sensation, & tone  Psych: affect congruent with mood  MSK: Tenderness to palpation bilateral lumbar paraspinal muscles. No pinpoint spinal tenderness, no step offs. No redness or swelling. Negative straight leg. 5/5 strength BLE. Normal sensation    ----- Service Performed and Documented by Resident or Fellow ------

## 2023-02-07 ENCOUNTER — OFFICE VISIT (OUTPATIENT)
Dept: FAMILY MEDICINE | Facility: CLINIC | Age: 43
End: 2023-02-07
Payer: COMMERCIAL

## 2023-02-07 VITALS
HEART RATE: 52 BPM | WEIGHT: 199 LBS | DIASTOLIC BLOOD PRESSURE: 89 MMHG | SYSTOLIC BLOOD PRESSURE: 126 MMHG | OXYGEN SATURATION: 99 % | RESPIRATION RATE: 16 BRPM | TEMPERATURE: 97.4 F | BODY MASS INDEX: 34.43 KG/M2

## 2023-02-07 DIAGNOSIS — F33.1 MODERATE EPISODE OF RECURRENT MAJOR DEPRESSIVE DISORDER (H): ICD-10-CM

## 2023-02-07 DIAGNOSIS — G89.29 CHRONIC PAIN OF RIGHT KNEE: Primary | ICD-10-CM

## 2023-02-07 DIAGNOSIS — F41.9 ANXIETY: ICD-10-CM

## 2023-02-07 DIAGNOSIS — M25.561 CHRONIC PAIN OF RIGHT KNEE: Primary | ICD-10-CM

## 2023-02-07 DIAGNOSIS — Z02.89 ENCOUNTER FOR COMPLETION OF FORM WITH PATIENT: ICD-10-CM

## 2023-02-07 DIAGNOSIS — G43.009 MIGRAINE WITHOUT AURA AND WITHOUT STATUS MIGRAINOSUS, NOT INTRACTABLE: ICD-10-CM

## 2023-02-07 DIAGNOSIS — G89.29 CHRONIC BILATERAL LOW BACK PAIN WITHOUT SCIATICA: ICD-10-CM

## 2023-02-07 DIAGNOSIS — M54.50 CHRONIC BILATERAL LOW BACK PAIN WITHOUT SCIATICA: ICD-10-CM

## 2023-02-07 PROCEDURE — 99214 OFFICE O/P EST MOD 30 MIN: CPT | Mod: GC | Performed by: STUDENT IN AN ORGANIZED HEALTH CARE EDUCATION/TRAINING PROGRAM

## 2023-02-07 RX ORDER — SUMATRIPTAN 50 MG/1
50 TABLET, FILM COATED ORAL
Qty: 30 TABLET | Refills: 1 | Status: SHIPPED | OUTPATIENT
Start: 2023-02-07 | End: 2023-04-13

## 2023-02-07 RX ORDER — ACETAMINOPHEN 325 MG/1
325-650 TABLET ORAL EVERY 6 HOURS PRN
Qty: 90 TABLET | Refills: 1 | Status: SHIPPED | OUTPATIENT
Start: 2023-02-07 | End: 2023-05-08

## 2023-02-07 RX ORDER — HYDROXYZINE HYDROCHLORIDE 25 MG/1
25 TABLET, FILM COATED ORAL 3 TIMES DAILY PRN
Qty: 30 TABLET | Refills: 4 | Status: SHIPPED | OUTPATIENT
Start: 2023-02-07 | End: 2023-05-08

## 2023-02-07 NOTE — PROGRESS NOTES
Preceptor Attestation:    I discussed the patient with the resident and evaluated the patient in person. I have verified the content of the note, which accurately reflects my assessment of the patient and the plan of care.   Supervising Physician:  Lul Piper MD.

## 2023-03-31 ENCOUNTER — OFFICE VISIT (OUTPATIENT)
Dept: FAMILY MEDICINE | Facility: CLINIC | Age: 43
End: 2023-03-31
Payer: COMMERCIAL

## 2023-03-31 VITALS
TEMPERATURE: 98.8 F | BODY MASS INDEX: 34.6 KG/M2 | DIASTOLIC BLOOD PRESSURE: 69 MMHG | HEART RATE: 59 BPM | SYSTOLIC BLOOD PRESSURE: 103 MMHG | RESPIRATION RATE: 16 BRPM | WEIGHT: 200 LBS | OXYGEN SATURATION: 97 %

## 2023-03-31 DIAGNOSIS — H10.13 ALLERGIC CONJUNCTIVITIS, BILATERAL: Primary | ICD-10-CM

## 2023-03-31 PROCEDURE — 99213 OFFICE O/P EST LOW 20 MIN: CPT | Mod: GC | Performed by: STUDENT IN AN ORGANIZED HEALTH CARE EDUCATION/TRAINING PROGRAM

## 2023-03-31 ASSESSMENT — ANXIETY QUESTIONNAIRES
GAD7 TOTAL SCORE: 2
6. BECOMING EASILY ANNOYED OR IRRITABLE: NOT AT ALL
7. FEELING AFRAID AS IF SOMETHING AWFUL MIGHT HAPPEN: SEVERAL DAYS
3. WORRYING TOO MUCH ABOUT DIFFERENT THINGS: SEVERAL DAYS
IF YOU CHECKED OFF ANY PROBLEMS ON THIS QUESTIONNAIRE, HOW DIFFICULT HAVE THESE PROBLEMS MADE IT FOR YOU TO DO YOUR WORK, TAKE CARE OF THINGS AT HOME, OR GET ALONG WITH OTHER PEOPLE: SOMEWHAT DIFFICULT
1. FEELING NERVOUS, ANXIOUS, OR ON EDGE: NOT AT ALL
GAD7 TOTAL SCORE: 2
2. NOT BEING ABLE TO STOP OR CONTROL WORRYING: NOT AT ALL
5. BEING SO RESTLESS THAT IT IS HARD TO SIT STILL: NOT AT ALL

## 2023-03-31 ASSESSMENT — PATIENT HEALTH QUESTIONNAIRE - PHQ9
5. POOR APPETITE OR OVEREATING: NOT AT ALL
SUM OF ALL RESPONSES TO PHQ QUESTIONS 1-9: 0

## 2023-03-31 NOTE — PROGRESS NOTES
There are no exam notes on file for this visit.    Assessment & Plan    1. Allergic conjunctivitis, bilateral  Reports itchy eyes often. Requests eye drops to help with the itching.   - ketotifen (ZADITOR) 0.025 % ophthalmic solution; Place 1 drop into both eyes 2 times daily  Dispense: 10 mL; Refill: 11    Preventative Health: Recommended CPE    For today's visit, I reviewed patient's PMH, PSH, FH, Medications, Allergies, Immunizations, and notes from most recent clinic encounter(s).  Patient's diagnosis, treatment, and care coordination is limited due to social determinants of health - Limited income, low health literacy, language barrier  I reviewed most recent labs.   15 minutes spent on the date of the encounter doing chart review, history and exam, documentation, care coordination, and further activities per the note    Benita Behm, MD PGY3  [facility]  03/31/23    I precepted today with Dr. Guidry.    Mae Rangel is a 43 year old who presents for the following health issues: Med Refills    HPI    Patient feels well on her current medication regimen, no side effects, does not feel she needs dose adjustments.   She requests eye drops for itchy eyes she gets often. Denies dry eyes or excessive watery eyes.     Review of Systems  Constitutional, HEENT, cardiovascular, pulmonary, gi and gu systems are negative, except as otherwise noted.    Objective   /69   Pulse 59   Temp 98.8  F (37.1  C) (Oral)   Resp 16   Wt 90.7 kg (200 lb)   LMP 03/22/2023 (Approximate)   SpO2 97%   BMI 34.60 kg/m    Physical Exam  GENERAL: Awake, alert, cooperative, no apparent distress  EYES: Lids and lashes normal, pupils equal, round and reactive to light, extra ocular muscles intact, sclera clear, conjunctiva normal. No hypopyon or hyphema.  HENT: Normocephalic, without obvious abnormality, atraumatic, external ears without lesions.  MSK: Full range of motion noted. Tone is normal.  NEURO:  Awake, alert, oriented to name, place and time.  Cranial nerves II-XII are grossly intact. Gait is normal.  NEUROPSYCH: Normal affect, mood, orientation, and memory. Coherent speech, normal rate and volume, able to articulate logical thoughts, able to abstract reason, no tangential thoughts, no hallucinations or delusions   SKIN: No visible rashes, erythema, pallor, ecchymosis, petechia or purpura.    ----- Service Performed and Documented by Resident or Fellow ------

## 2023-04-13 ENCOUNTER — OFFICE VISIT (OUTPATIENT)
Dept: FAMILY MEDICINE | Facility: CLINIC | Age: 43
End: 2023-04-13
Payer: COMMERCIAL

## 2023-04-13 VITALS
TEMPERATURE: 98.6 F | SYSTOLIC BLOOD PRESSURE: 117 MMHG | DIASTOLIC BLOOD PRESSURE: 74 MMHG | OXYGEN SATURATION: 98 % | BODY MASS INDEX: 33.97 KG/M2 | HEART RATE: 56 BPM | HEIGHT: 64 IN | WEIGHT: 199 LBS | RESPIRATION RATE: 16 BRPM

## 2023-04-13 DIAGNOSIS — M25.561 CHRONIC PAIN OF RIGHT KNEE: Primary | ICD-10-CM

## 2023-04-13 DIAGNOSIS — G89.29 CHRONIC PAIN OF RIGHT KNEE: Primary | ICD-10-CM

## 2023-04-13 DIAGNOSIS — G43.009 MIGRAINE WITHOUT AURA AND WITHOUT STATUS MIGRAINOSUS, NOT INTRACTABLE: ICD-10-CM

## 2023-04-13 DIAGNOSIS — Z12.31 VISIT FOR SCREENING MAMMOGRAM: ICD-10-CM

## 2023-04-13 DIAGNOSIS — K29.00 OTHER ACUTE GASTRITIS WITHOUT HEMORRHAGE: ICD-10-CM

## 2023-04-13 DIAGNOSIS — F33.1 MODERATE EPISODE OF RECURRENT MAJOR DEPRESSIVE DISORDER (H): ICD-10-CM

## 2023-04-13 PROCEDURE — 99214 OFFICE O/P EST MOD 30 MIN: CPT | Mod: GC | Performed by: STUDENT IN AN ORGANIZED HEALTH CARE EDUCATION/TRAINING PROGRAM

## 2023-04-13 RX ORDER — SUMATRIPTAN 50 MG/1
50 TABLET, FILM COATED ORAL
Qty: 30 TABLET | Refills: 1 | Status: SHIPPED | OUTPATIENT
Start: 2023-04-13 | End: 2023-10-18

## 2023-04-13 RX ORDER — FAMOTIDINE 20 MG/1
20 TABLET, FILM COATED ORAL 2 TIMES DAILY
Qty: 30 TABLET | Refills: 1 | Status: SHIPPED | OUTPATIENT
Start: 2023-04-13 | End: 2024-04-01

## 2023-04-13 NOTE — PROGRESS NOTES
Assessment and Plan    Ed was seen today for abdominal pain.  Dull non radiating epigastric pain, that is exacerbated by spicy food.  Patient takes ibuprofen 400 mg 3 times a day for some time due to knee pain.  Differential might include drug-induced gastritis, H. pylori, but cancer is low in the differential.  We will start the patient on famotidine as a trial for 4 weeks.  If no improvement we will test the patient for H. pylori  Patient was recommended stopping ibuprofen and was prescribed Voltaren to use for her knee pain.  Also patient requested medication refill.  Diagnoses and all orders for this visit:    Chronic pain of right knee  -     diclofenac (VOLTAREN) 1 % topical gel; Apply 4 g topically 4 times daily as needed for moderate pain For knee pain    Visit for screening mammogram  -     MA SCREENING DIGITAL BILAT - Future  (s+30); Future    Moderate episode of recurrent major depressive disorder (H)  -     sertraline (ZOLOFT) 50 MG tablet; Take 1 tablet (50 mg) by mouth daily    Migraine without aura and without status migrainosus, not intractable  -     SUMAtriptan (IMITREX) 50 MG tablet; Take 1 tablet (50 mg) by mouth at onset of headache for migraine May repeat in 2 hours. Max 4 tablets/24 hours.    Other acute gastritis without hemorrhage  -     famotidine (PEPCID) 20 MG tablet; Take 1 tablet (20 mg) by mouth 2 times daily  -Consider H. pylori testing if no improvement in 4 weeks             Options for treatment and follow-up care were reviewed with the patient. Patient engaged in the decision making process and verbalized understanding of the options discussed and agreed with the final plan.    Patient was staffed with attending physician MD Mira Sánchez MD PGY3           HPI       Ed Rangel is a 43 year old year old female w/ PMH of   Patient Active Problem List   Diagnosis     Absolute glaucoma, right eye     Blind right eye     Lung granuloma (H)     Microphthalmia  with cataract     Migraine     Language barrier, cultural differences     Betel deposit on teeth     Acute pain of right knee     Pulmonary nodules     Depression     Cholelithiasis     Hepatic steatosis     Gallbladder polyp     Morbid obesity (H)     Chronic bilateral low back pain without sciatica    who presents for epigastric pain for some time.  The pain is, constant, exacerbated by eating spicy food.now, no relieving factor.  Did try to take ibuprofen and Tylenol without relief.  Patient takes ibuprofen 400 mg 3 times a day for some time for her knee pain.  Denies heartburn.  No nausea or vomiting.  No constipation or diarrhea.  Stool is yellowish nonbloody.          A [employer]  was used for  this visit.    +++++++      Problem, Medication and Allergy Lists were   reviewed and updated if needed.     Patient Active Problem List    Diagnosis Date Noted     Chronic bilateral low back pain without sciatica 04/18/2022     Priority: Medium     Morbid obesity (H) 12/29/2021     Priority: Medium     Cholelithiasis 05/18/2021     Priority: Medium     Noted on Ultrasound 5/2021.        Hepatic steatosis 05/18/2021     Priority: Medium     Noted on RUQ Ultrasound 5/2021       Gallbladder polyp 05/18/2021     Priority: Medium     Noted on ultrasound 5/2021.  Patient declined referral to surgery.   Recommend recheck RUQ in 6 months        Depression 03/23/2021     Priority: Medium     Pulmonary nodules 01/30/2020     Priority: Medium     Incidental nodule seen on 1/29/20 CXR. To be discussed with patient at next visit--consider CT vs repeat CXR in 6 months.        Acute pain of right knee 03/01/2019     Priority: Medium     2/26/19 Pt slipped on the ice and fell injuring her rt knee. She was referred to Ortho.  3/26/19 follow-up with Dr Lees.  Pt has chronic patella fracture and he recommends that pt have physical therapy to regain strength in her leg.  Pt does not want surgery and ortho does not feel this would  "help anyway.       Language barrier, cultural differences 11/26/2018     Priority: Medium     Betel deposit on teeth 11/21/2018     Priority: Medium     11/19/18 Pt reports chewing betel nut 2-3 x's per week.  Discussed importance of cessation during pregnancy.  Pt denied need for assistance with quitting.  Will NEED to reevaluate at future visits.       Migraine      Priority: Medium     Saw Dr. Baker at [facility] on 9/13/2018. Was taking ibuprofen 600 mg q6h but transitioned to PRN 1-2 times a week. Added nortriptyline 10mg qPM.   \"Encouraged patient to stop taking the [place] medication as it is unclear as to what the medication is.\"   DO FLAKO 11/13/18 2:46 PM        Absolute glaucoma, right eye 05/25/2018     Priority: Medium     Microphthalmia with cataract 05/25/2018     Priority: Medium     Blind right eye 05/07/2018     Priority: Medium     Overview:   Documentation per [place] screening [place].  Please see scanned document.   History of right-sided vision loss since childhood.  Unknown etiology.  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  Patient complains of pain and tearful right eye.  Consultation with ophthalmologist for eye extraction considered at destination country as recommended.     Dr. Baker of [facility] provided ophthalmology referral on 9/13/2018. He noted sporadic use of timolol drops.   BERNARDSTEVENDO 11/13/18 2:47 PM        Lung granuloma (H) 05/07/2018     Priority: Medium     Overview:   Per overseas screening examination.  Please see documentation in scanned documents.  Per chest x-ray right lower lung granuloma November 9, 2016 and July 6, 2017.  AFB smear and cultures collected November 20 2-24, 2016 negative.         Patient is an established patient of this clinic.         Review of Systems:   12 point ROS negative other than as specified above.         Physical Exam:   /74   Pulse 56   Temp 98.6  F (37  C) (Oral)   Resp 16   Ht 1.626 m " "(5' 4\")   Wt 90.3 kg (199 lb)   LMP 03/22/2023 (Approximate)   SpO2 98%   BMI 34.16 kg/m      Vitals were reviewed and were normal     Exam:  Constitutional: healthy, alert, no distress, and cooperative  Head: Normocephalic. No masses, lesions, tenderness or abnormalities  Neck: Neck supple. No adenopathy.  ENT: throat normal without erythema or exudate  Cardiovascular: RRR w/o audible murmur  Respiratory: bilateral clear lungs w/o wheezing, crackles or rhonchi; breathing comfortably on RA  Gastrointestinal: Abdomen soft, non-tender. BS normal.  : Deferred  Musculoskeletal: extremities normal- no gross deformities noted, gait normal, and normal muscle tone  Skin: no suspicious lesions or rashes  Neurologic: grossly normal CN; normal strength, sensation, & tone  Psychiatric: mentation appears normal and affect normal/bright        Results:    Results from this visitNo results found for any visits on 04/13/23.      "

## 2023-05-08 ENCOUNTER — OFFICE VISIT (OUTPATIENT)
Dept: FAMILY MEDICINE | Facility: CLINIC | Age: 43
End: 2023-05-08
Payer: COMMERCIAL

## 2023-05-08 VITALS
HEART RATE: 55 BPM | RESPIRATION RATE: 16 BRPM | WEIGHT: 197.2 LBS | BODY MASS INDEX: 33.85 KG/M2 | DIASTOLIC BLOOD PRESSURE: 68 MMHG | TEMPERATURE: 97.8 F | OXYGEN SATURATION: 98 % | SYSTOLIC BLOOD PRESSURE: 99 MMHG

## 2023-05-08 DIAGNOSIS — M25.561 CHRONIC PAIN OF RIGHT KNEE: ICD-10-CM

## 2023-05-08 DIAGNOSIS — F33.1 MODERATE EPISODE OF RECURRENT MAJOR DEPRESSIVE DISORDER (H): ICD-10-CM

## 2023-05-08 DIAGNOSIS — G89.29 CHRONIC BILATERAL LOW BACK PAIN WITHOUT SCIATICA: ICD-10-CM

## 2023-05-08 DIAGNOSIS — K29.00 OTHER ACUTE GASTRITIS WITHOUT HEMORRHAGE: Primary | ICD-10-CM

## 2023-05-08 DIAGNOSIS — G89.29 CHRONIC PAIN OF RIGHT KNEE: ICD-10-CM

## 2023-05-08 DIAGNOSIS — F41.9 ANXIETY: ICD-10-CM

## 2023-05-08 DIAGNOSIS — Z76.0 ENCOUNTER FOR MEDICATION REFILL: ICD-10-CM

## 2023-05-08 DIAGNOSIS — M54.50 CHRONIC BILATERAL LOW BACK PAIN WITHOUT SCIATICA: ICD-10-CM

## 2023-05-08 PROCEDURE — 99214 OFFICE O/P EST MOD 30 MIN: CPT | Mod: GC | Performed by: STUDENT IN AN ORGANIZED HEALTH CARE EDUCATION/TRAINING PROGRAM

## 2023-05-08 RX ORDER — HYDROXYZINE HYDROCHLORIDE 25 MG/1
25 TABLET, FILM COATED ORAL 3 TIMES DAILY PRN
Qty: 30 TABLET | Refills: 4 | Status: SHIPPED | OUTPATIENT
Start: 2023-05-08 | End: 2023-10-18

## 2023-05-08 RX ORDER — ACETAMINOPHEN 325 MG/1
325-650 TABLET ORAL EVERY 6 HOURS PRN
Qty: 90 TABLET | Refills: 1 | Status: SHIPPED | OUTPATIENT
Start: 2023-05-08 | End: 2023-10-18

## 2023-05-08 ASSESSMENT — PATIENT HEALTH QUESTIONNAIRE - PHQ9: SUM OF ALL RESPONSES TO PHQ QUESTIONS 1-9: 3

## 2023-05-08 NOTE — PROGRESS NOTES
Assessment and Plan   Ed was seen today for follow-up for epigastric pain and medication refill.  Patient was seen recently for epigastric pain that is improved with famotidine 20 mg.  Although the pain is improved but she patient still have dull, intermittent epigastric pain sometimes does exacerbate with food.  Will check H. pylori and if it is negative we will start patient on a trial of PPIs.  If no help from the PPIs we will send the patient for EGD although there is no red flag.  Also, patient requested multiple medication refill    Diagnoses and all orders for this visit:    Other acute gastritis without hemorrhage  -     Helicobacter pylori Antigen Stool    Anxiety  -     hydrOXYzine (ATARAX) 25 MG tablet; Take 1 tablet (25 mg) by mouth 3 times daily as needed for anxiety    Chronic bilateral low back pain without sciatica  -     acetaminophen (TYLENOL) 325 MG tablet; Take 1-2 tablets (325-650 mg) by mouth every 6 hours as needed for mild pain    Moderate episode of recurrent major depressive disorder (H)  -     sertraline (ZOLOFT) 50 MG tablet; Take 1 tablet (50 mg) by mouth daily    Chronic pain of right knee  -     diclofenac (VOLTAREN) 1 % topical gel; Apply 4 g topically 4 times daily as needed for moderate pain For knee pain    Encounter for medication refill             Options for treatment and follow-up care were reviewed with the patient. Patient engaged in the decision making process and verbalized understanding of the options discussed and agreed with the final plan.    Patient was staffed with attending physician MD Mira Haley MD PGY3           HPI       Ed Rangel is a 43 year old year old female w/ PMH of   Patient Active Problem List   Diagnosis     Absolute glaucoma, right eye     Blind right eye     Lung granuloma (H)     Microphthalmia with cataract     Migraine     Language barrier, cultural differences     Betel deposit on teeth     Acute pain of right knee      Pulmonary nodules     Depression     Cholelithiasis     Hepatic steatosis     Gallbladder polyp     Morbid obesity (H)     Chronic bilateral low back pain without sciatica    who presents for follow-up for epigastric pain and medication refill.  Patient was seen recently for epigastric pain and was prescribed famotidine 20 mg.  Patient is stated her pain getting better but she still having abdominal intermittent pain sometime exacerbated by eating.  Stated sometimes she is also eating spicy food.  Patient used to take ibuprofen 400 mg 3 times daily.  On the prior visit patient was recommended stopping ibuprofen.  Patient denies change in her stool color, nausea/vomiting, or diarrhea/constipation.  No fever reported.           A [employer]  was used for  this visit.    +++++++      Problem, Medication and Allergy Lists were   reviewed and updated if needed.     Patient Active Problem List    Diagnosis Date Noted     Chronic bilateral low back pain without sciatica 04/18/2022     Priority: Medium     Morbid obesity (H) 12/29/2021     Priority: Medium     Cholelithiasis 05/18/2021     Priority: Medium     Noted on Ultrasound 5/2021.        Hepatic steatosis 05/18/2021     Priority: Medium     Noted on RUQ Ultrasound 5/2021       Gallbladder polyp 05/18/2021     Priority: Medium     Noted on ultrasound 5/2021.  Patient declined referral to surgery.   Recommend recheck RUQ in 6 months        Depression 03/23/2021     Priority: Medium     Pulmonary nodules 01/30/2020     Priority: Medium     Incidental nodule seen on 1/29/20 CXR. To be discussed with patient at next visit--consider CT vs repeat CXR in 6 months.        Acute pain of right knee 03/01/2019     Priority: Medium     2/26/19 Pt slipped on the ice and fell injuring her rt knee. She was referred to Ortho.  3/26/19 follow-up with Dr Lees.  Pt has chronic patella fracture and he recommends that pt have physical therapy to regain strength in her leg.  Pt  "does not want surgery and ortho does not feel this would help anyway.       Language barrier, cultural differences 11/26/2018     Priority: Medium     Betel deposit on teeth 11/21/2018     Priority: Medium     11/19/18 Pt reports chewing betel nut 2-3 x's per week.  Discussed importance of cessation during pregnancy.  Pt denied need for assistance with quitting.  Will NEED to reevaluate at future visits.       Migraine      Priority: Medium     Saw Dr. Baker at [facility] on 9/13/2018. Was taking ibuprofen 600 mg q6h but transitioned to PRN 1-2 times a week. Added nortriptyline 10mg qPM.   \"Encouraged patient to stop taking the [place] medication as it is unclear as to what the medication is.\"   DO FLAKO 11/13/18 2:46 PM        Absolute glaucoma, right eye 05/25/2018     Priority: Medium     Microphthalmia with cataract 05/25/2018     Priority: Medium     Blind right eye 05/07/2018     Priority: Medium     Overview:   Documentation per overseas screening examination.  Please see scanned document.   History of right-sided vision loss since childhood.  Unknown etiology.  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  Patient complains of pain and tearful right eye.  Consultation with ophthalmologist for eye extraction considered at destination country as recommended.     Dr. Baker of [facility] provided ophthalmology referral on 9/13/2018. He noted sporadic use of timolol drops.   BERNARDSTEVENDO 11/13/18 2:47 PM        Lung granuloma (H) 05/07/2018     Priority: Medium     Overview:   Per overseas screening examination.  Please see documentation in scanned documents.  Per chest x-ray right lower lung granuloma November 9, 2016 and July 6, 2017.  AFB smear and cultures collected November 20 2-24, 2016 negative.         Patient is an established patient of this clinic.         Review of Systems:   12 point ROS negative other than as specified above.         Physical Exam:   BP 99/68   Pulse 55 "   Temp 97.8  F (36.6  C)   Resp 16   Wt 89.4 kg (197 lb 3.2 oz)   LMP 03/22/2023 (Approximate)   SpO2 98%   BMI 33.85 kg/m      Vitals were reviewed and were normal     Exam:  Constitutional: healthy, alert, no distress, and cooperative  Head: Normocephalic. No masses, lesions, tenderness or abnormalities  Neck: Neck supple. No adenopathy.  ENT: throat normal without erythema or exudate  Cardiovascular: RRR w/o audible murmur  Respiratory: bilateral clear lungs w/o wheezing, crackles or rhonchi; breathing comfortably on RA  Gastrointestinal: Abdomen soft, mild epigastric tender. BS normal.  : Deferred  Musculoskeletal: extremities normal- no gross deformities noted, gait normal, and normal muscle tone  Skin: no suspicious lesions or rashes  Neurologic: grossly normal CN; normal strength, sensation, & tone  Psychiatric: mentation appears normal and affect normal/bright        Results:    Results from this visitNo results found for any visits on 05/08/23.

## 2023-05-10 PROCEDURE — 87338 HPYLORI STOOL AG IA: CPT | Performed by: STUDENT IN AN ORGANIZED HEALTH CARE EDUCATION/TRAINING PROGRAM

## 2023-05-11 DIAGNOSIS — A04.8 H. PYLORI INFECTION: Primary | ICD-10-CM

## 2023-05-11 LAB — H PYLORI AG STL QL IA: POSITIVE

## 2023-05-11 RX ORDER — AMOXICILLIN 500 MG/1
1000 CAPSULE ORAL 2 TIMES DAILY
Qty: 56 CAPSULE | Refills: 0 | Status: SHIPPED | OUTPATIENT
Start: 2023-05-11 | End: 2023-05-25

## 2023-05-11 RX ORDER — CLARITHROMYCIN 500 MG
500 TABLET ORAL 2 TIMES DAILY
Qty: 28 TABLET | Refills: 0 | Status: SHIPPED | OUTPATIENT
Start: 2023-05-11 | End: 2023-05-25

## 2023-05-18 ENCOUNTER — ANCILLARY PROCEDURE (OUTPATIENT)
Dept: MAMMOGRAPHY | Facility: CLINIC | Age: 43
End: 2023-05-18
Attending: FAMILY MEDICINE
Payer: COMMERCIAL

## 2023-05-18 DIAGNOSIS — Z12.31 VISIT FOR SCREENING MAMMOGRAM: ICD-10-CM

## 2023-05-18 PROCEDURE — 77067 SCR MAMMO BI INCL CAD: CPT | Mod: TC | Performed by: RADIOLOGY

## 2023-10-18 ENCOUNTER — OFFICE VISIT (OUTPATIENT)
Dept: FAMILY MEDICINE | Facility: CLINIC | Age: 43
End: 2023-10-18
Payer: COMMERCIAL

## 2023-10-18 VITALS
HEIGHT: 63 IN | OXYGEN SATURATION: 98 % | SYSTOLIC BLOOD PRESSURE: 151 MMHG | HEART RATE: 57 BPM | TEMPERATURE: 97.8 F | BODY MASS INDEX: 34.84 KG/M2 | WEIGHT: 196.6 LBS | RESPIRATION RATE: 16 BRPM | DIASTOLIC BLOOD PRESSURE: 83 MMHG

## 2023-10-18 DIAGNOSIS — M54.50 CHRONIC BILATERAL LOW BACK PAIN WITHOUT SCIATICA: ICD-10-CM

## 2023-10-18 DIAGNOSIS — Z76.0 ENCOUNTER FOR MEDICATION REFILL: Primary | ICD-10-CM

## 2023-10-18 DIAGNOSIS — G89.29 CHRONIC BILATERAL LOW BACK PAIN WITHOUT SCIATICA: ICD-10-CM

## 2023-10-18 DIAGNOSIS — Z23 NEED FOR PROPHYLACTIC VACCINATION AND INOCULATION AGAINST INFLUENZA: ICD-10-CM

## 2023-10-18 DIAGNOSIS — F41.9 ANXIETY: ICD-10-CM

## 2023-10-18 DIAGNOSIS — G43.009 MIGRAINE WITHOUT AURA AND WITHOUT STATUS MIGRAINOSUS, NOT INTRACTABLE: ICD-10-CM

## 2023-10-18 DIAGNOSIS — F33.1 MODERATE EPISODE OF RECURRENT MAJOR DEPRESSIVE DISORDER (H): ICD-10-CM

## 2023-10-18 PROCEDURE — 99214 OFFICE O/P EST MOD 30 MIN: CPT | Mod: 25

## 2023-10-18 PROCEDURE — 90471 IMMUNIZATION ADMIN: CPT

## 2023-10-18 PROCEDURE — 90686 IIV4 VACC NO PRSV 0.5 ML IM: CPT

## 2023-10-18 RX ORDER — HYDROXYZINE HYDROCHLORIDE 25 MG/1
25 TABLET, FILM COATED ORAL 3 TIMES DAILY PRN
Qty: 30 TABLET | Refills: 4 | Status: SHIPPED | OUTPATIENT
Start: 2023-10-18 | End: 2024-05-14

## 2023-10-18 RX ORDER — ACETAMINOPHEN 325 MG/1
325-650 TABLET ORAL EVERY 6 HOURS PRN
Qty: 90 TABLET | Refills: 1 | Status: SHIPPED | OUTPATIENT
Start: 2023-10-18

## 2023-10-18 RX ORDER — SUMATRIPTAN 50 MG/1
50 TABLET, FILM COATED ORAL
Qty: 30 TABLET | Refills: 1 | Status: SHIPPED | OUTPATIENT
Start: 2023-10-18 | End: 2024-01-12

## 2023-10-18 ASSESSMENT — ANXIETY QUESTIONNAIRES
1. FEELING NERVOUS, ANXIOUS, OR ON EDGE: SEVERAL DAYS
5. BEING SO RESTLESS THAT IT IS HARD TO SIT STILL: NOT AT ALL
GAD7 TOTAL SCORE: 2
GAD7 TOTAL SCORE: 2
7. FEELING AFRAID AS IF SOMETHING AWFUL MIGHT HAPPEN: NOT AT ALL
2. NOT BEING ABLE TO STOP OR CONTROL WORRYING: NOT AT ALL
6. BECOMING EASILY ANNOYED OR IRRITABLE: SEVERAL DAYS
IF YOU CHECKED OFF ANY PROBLEMS ON THIS QUESTIONNAIRE, HOW DIFFICULT HAVE THESE PROBLEMS MADE IT FOR YOU TO DO YOUR WORK, TAKE CARE OF THINGS AT HOME, OR GET ALONG WITH OTHER PEOPLE: SOMEWHAT DIFFICULT
3. WORRYING TOO MUCH ABOUT DIFFERENT THINGS: NOT AT ALL

## 2023-10-18 ASSESSMENT — PATIENT HEALTH QUESTIONNAIRE - PHQ9
SUM OF ALL RESPONSES TO PHQ QUESTIONS 1-9: 4
5. POOR APPETITE OR OVEREATING: NOT AT ALL

## 2023-10-18 NOTE — PROGRESS NOTES
Assessment & Plan     Encounter for medication refill  Migraine without aura and without status migrainosus, not intractable  Improved symptomology. Able to perform ADLs without difficulty.  Usually takes tyelnol with moderate relief and if severe symptom, imitrex for total relief. Reqeuests refill on acetaminophen and Imitrex. Refill sent. Follow up as needed.   - SUMAtriptan (IMITREX) 50 MG tablet  Dispense: 30 tablet; Refill: 1    Chronic bilateral low back pain without sciatica  refilled  - acetaminophen (TYLENOL) 325 MG tablet  Dispense: 90 tablet; Refill: 1    Anxiety  refilled  - hydrOXYzine (ATARAX) 25 MG tablet  Dispense: 30 tablet; Refill: 4    Moderate episode of recurrent major depressive disorder (H)  Refilled. PHQ stable at 4.   - sertraline (ZOLOFT) 50 MG tablet  Dispense: 90 tablet; Refill: 3    Need for prophylactic vaccination and inoculation against influenza  Vaccine given.        Patient Instructions   Thank you for trusting us with your care.     Here's a summary of the visit today:   Medication refills sent to pharmacy   You will get the flu shot today   Follow up as needed            Thank you.     Dr. Villalpando      Return if symptoms worsen or fail to improve.    Brendan Villalpando DO, PGY-3  [facility]    Today I precepted with Dr. Coleman MD, who agrees with the assessment and plan.      Mae Ward is a 43 year old, presenting for the following health issues:  Recheck Medication, Migraine (Like refill done), Refill Request (Anxiety med feeling anxious ), and Imm/Inj (Flu Shot)      HPI       Migraine followup    Headaches symptoms:  Improved   Frequency (per week or month): once weekly   Duration of headaches: less than 30 mins, usuallty take medication before bed and fall asleep   Able to do normal daily activities/work with migraines: Yes  Rescue/Relief medication:sumatriptan (Imitrex)              Effectiveness:total relief  Preventative medication:  "None  Neurologic complications: No new stroke-like symptoms, loss of vision or speech, numbness or weakness           3/31/2023     2:14 PM 5/8/2023    10:34 AM 10/18/2023     9:49 AM   PHQ   PHQ-9 Total Score 0 3 4   Q9: Thoughts of better off dead/self-harm past 2 weeks Not at all Not at all Not at all          Current Outpatient Medications   Medication    acetaminophen (TYLENOL) 325 MG tablet    hydrOXYzine (ATARAX) 25 MG tablet    sertraline (ZOLOFT) 50 MG tablet    SUMAtriptan (IMITREX) 50 MG tablet    diclofenac (VOLTAREN) 1 % topical gel    famotidine (PEPCID) 20 MG tablet    famotidine (PEPCID) 20 MG tablet    ketotifen (ZADITOR) 0.025 % ophthalmic solution     No current facility-administered medications for this visit.         Review of Systems   Constitutional, HEENT, cardiovascular, pulmonary, gi and gu systems are negative, except as otherwise noted.      Objective    BP (!) 151/83 (BP Location: Left arm, Patient Position: Sitting, Cuff Size: Adult Regular)   Pulse 57   Temp 97.8  F (36.6  C) (Oral)   Resp 16   Ht 1.607 m (5' 3.25\")   Wt 89.2 kg (196 lb 9.6 oz)   SpO2 98%   BMI 34.55 kg/m    Body mass index is 34.55 kg/m .  Physical Exam   GENERAL: healthy, alert and no distress  NECK: no adenopathy, no asymmetry, masses, or scars and thyroid normal to palpation  RESP: lungs clear to auscultation - no rales, rhonchi or wheezes  CV: regular rate and rhythm, normal S1 S2, no S3 or S4, no murmur, click or rub, no peripheral edema and peripheral pulses strong  ABDOMEN: soft, nontender, no hepatosplenomegaly, no masses and bowel sounds normal  MS: no gross musculoskeletal defects noted, no edema                "

## 2023-10-18 NOTE — PATIENT INSTRUCTIONS
Thank you for trusting us with your care.     Here's a summary of the visit today:   Medication refills sent to pharmacy   You will get the flu shot today   Follow up as needed            Thank you.     Dr. Villalpando

## 2023-11-17 ENCOUNTER — OFFICE VISIT (OUTPATIENT)
Dept: FAMILY MEDICINE | Facility: CLINIC | Age: 43
End: 2023-11-17
Payer: COMMERCIAL

## 2023-11-17 VITALS
OXYGEN SATURATION: 98 % | HEART RATE: 55 BPM | BODY MASS INDEX: 34.87 KG/M2 | TEMPERATURE: 97.5 F | WEIGHT: 198.4 LBS | SYSTOLIC BLOOD PRESSURE: 117 MMHG | RESPIRATION RATE: 20 BRPM | DIASTOLIC BLOOD PRESSURE: 81 MMHG

## 2023-11-17 DIAGNOSIS — H57.13 PAIN OF BOTH EYES: ICD-10-CM

## 2023-11-17 DIAGNOSIS — A04.8 H. PYLORI INFECTION: Primary | ICD-10-CM

## 2023-11-17 DIAGNOSIS — H40.001 GLAUCOMA SUSPECT, RIGHT: ICD-10-CM

## 2023-11-17 PROCEDURE — 99214 OFFICE O/P EST MOD 30 MIN: CPT | Mod: GC

## 2023-11-17 RX ORDER — BISMUTH SUBSALICYLATE 262 MG/1
2 TABLET, CHEWABLE ORAL
Qty: 112 TABLET | Refills: 0 | Status: SHIPPED | OUTPATIENT
Start: 2023-11-17 | End: 2023-12-01

## 2023-11-17 RX ORDER — METRONIDAZOLE 500 MG/1
500 TABLET ORAL 3 TIMES DAILY
Qty: 42 TABLET | Refills: 0 | Status: SHIPPED | OUTPATIENT
Start: 2023-11-17 | End: 2023-12-01

## 2023-11-17 RX ORDER — DOXYCYCLINE 100 MG/1
100 CAPSULE ORAL 2 TIMES DAILY
Qty: 28 CAPSULE | Refills: 0 | Status: SHIPPED | OUTPATIENT
Start: 2023-11-17 | End: 2023-12-01

## 2023-11-17 NOTE — PROGRESS NOTES
Assessment & Plan     Pain of both eyes  Glaucoma suspect, right  Patient complaining of 3 weeks of bilateral eye pain.  Started in the right eye and then started having left eye pain 1 week ago.  History of absolute glaucoma in the right eye with permanent blindness since she was a child.  Has seen ophthalmology back in 2018 and was diagnosed with absolute glaucoma and started on timolol eyedrops which she did not find very helpful.  Was given ketotifen eyedrops earlier this year and she did not find them helpful either.  On exam some conjunctival injection and globe tenderness in the right eye.  Normal exam on the left eye.  Unclear what her official diagnosis is and would recommend that she follow-up with an ophthalmologist especially in the setting of some new/worsening blurry vision in the left eye.  For now we will give some artificial tears for comfort.  - Adult Eye  Referral  - dextran 70-hypromellose (TEARS NATURALE FREE PF) 0.1-0.3 % ophthalmic solution  Dispense: 36 each; Refill: 0    H. pylori infection  Had been treated with triple therapy 10/24/2022.  Had positive test 5/10/2023.  Recommended treating with salvage therapy based on this ongoing infection.  Currently asymptomatic.  We will have her follow-up with pharmacy to help set up med box.  Elected to go with bismuth quadruple therapy with doxycycline.  - metroNIDAZOLE (FLAGYL) 500 MG tablet  Dispense: 42 tablet; Refill: 0  - doxycycline hyclate (VIBRAMYCIN) 100 MG capsule  Dispense: 28 capsule; Refill: 0  - bismuth subsalicylate (PEPTO BISMOL) 262 MG chewable tablet  Dispense: 112 tablet; Refill: 0  - omeprazole (PRILOSEC) 20 MG DR capsule  Dispense: 28 capsule; Refill: 0      Jesse Hernandez MD  [facility]    Mae Ward is a 43 year old, presenting for the following health issues:  Eye Problem (R Eye pain started 3 wks ago - this week the pain has decreased - )        11/17/2023     8:35 AM   Additional  Questions   Roomed by RUDY muhammad MA   Accompanied by Self       HPI     History of right-sided vision loss since childhood.  Unknown etiology.  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  Patient complains of pain and tearful right eye. She saw eye doctor 5/25/2018 in the [facility] system. Was prescribed timolol eye drops for absolute glaucoma.     Eye(s) Problem  Onset/Duration: 3 weeks.   Description:   Location: Both; First the right eye now the left as well for the past 1 week.  Pain: YES  Redness: YES  Accompanying Signs & Symptoms:  Discharge/mattering: No  Swelling: No  Visual changes: Permanent vision loss.   Fever: Chills  Nasal Congestion: No  History:  Trauma: No  Foreign body exposure: No  Wearing contacts: No  Precipitating or alleviating factors: None        Objective    /81   Pulse 55   Temp 97.5  F (36.4  C) (Oral)   Resp 20   Wt 90 kg (198 lb 6.4 oz)   LMP 11/17/2023 (Exact Date)   SpO2 98%   BMI 34.87 kg/m    Body mass index is 34.87 kg/m .  Physical Exam   GENERAL: healthy, alert and no distress  EYES: Redness of the right eye without discharge.  Deformed pupil not reactive to light.  Tenderness to the right globe.  Left eye normal.        ----- Service Performed and Documented by Resident or Fellow ------

## 2023-11-20 ENCOUNTER — TELEPHONE (OUTPATIENT)
Dept: OPHTHALMOLOGY | Facility: CLINIC | Age: 43
End: 2023-11-20
Payer: COMMERCIAL

## 2023-11-20 ENCOUNTER — OFFICE VISIT (OUTPATIENT)
Dept: OPHTHALMOLOGY | Facility: CLINIC | Age: 43
End: 2023-11-20
Attending: STUDENT IN AN ORGANIZED HEALTH CARE EDUCATION/TRAINING PROGRAM
Payer: COMMERCIAL

## 2023-11-20 DIAGNOSIS — H54.40 BLIND PAINFUL RIGHT EYE: Primary | ICD-10-CM

## 2023-11-20 DIAGNOSIS — H43.392 VITREOUS SYNERESIS OF LEFT EYE: ICD-10-CM

## 2023-11-20 DIAGNOSIS — H57.11 BLIND PAINFUL RIGHT EYE: Primary | ICD-10-CM

## 2023-11-20 DIAGNOSIS — H54.415A BLINDNESS RIGHT EYE CATEGORY 5, NORMAL VISION LEFT EYE: ICD-10-CM

## 2023-11-20 PROCEDURE — 76512 OPH US DX B-SCAN: CPT

## 2023-11-20 PROCEDURE — 92250 FUNDUS PHOTOGRAPHY W/I&R: CPT | Mod: 26 | Performed by: STUDENT IN AN ORGANIZED HEALTH CARE EDUCATION/TRAINING PROGRAM

## 2023-11-20 PROCEDURE — G0463 HOSPITAL OUTPT CLINIC VISIT: HCPCS

## 2023-11-20 PROCEDURE — 76512 OPH US DX B-SCAN: CPT | Mod: 26 | Performed by: STUDENT IN AN ORGANIZED HEALTH CARE EDUCATION/TRAINING PROGRAM

## 2023-11-20 PROCEDURE — 92250 FUNDUS PHOTOGRAPHY W/I&R: CPT

## 2023-11-20 PROCEDURE — 99203 OFFICE O/P NEW LOW 30 MIN: CPT | Mod: GC | Performed by: STUDENT IN AN ORGANIZED HEALTH CARE EDUCATION/TRAINING PROGRAM

## 2023-11-20 RX ORDER — OLOPATADINE HYDROCHLORIDE 2 MG/ML
1 SOLUTION/ DROPS OPHTHALMIC DAILY
Qty: 2.5 ML | Refills: 3 | Status: SHIPPED | OUTPATIENT
Start: 2023-11-20 | End: 2024-05-14

## 2023-11-20 ASSESSMENT — TONOMETRY
IOP_METHOD: ICARE
OS_IOP_MMHG: 12
OD_IOP_MMHG: 03

## 2023-11-20 ASSESSMENT — CONF VISUAL FIELD
OS_INFERIOR_TEMPORAL_RESTRICTION: 0
OD_SUPERIOR_NASAL_RESTRICTION: 1
OS_SUPERIOR_NASAL_RESTRICTION: 0
OD_SUPERIOR_TEMPORAL_RESTRICTION: 1
OS_NORMAL: 1
OD_INFERIOR_NASAL_RESTRICTION: 1
OS_INFERIOR_NASAL_RESTRICTION: 0
OS_SUPERIOR_TEMPORAL_RESTRICTION: 0
OD_INFERIOR_TEMPORAL_RESTRICTION: 1

## 2023-11-20 ASSESSMENT — VISUAL ACUITY
METHOD: SNELLEN - LINEAR
OD_SC: NLP
OS_SC+: -2
OS_SC: 20/20

## 2023-11-20 ASSESSMENT — SLIT LAMP EXAM - LIDS
COMMENTS: NORMAL
COMMENTS: NORMAL

## 2023-11-20 NOTE — PROGRESS NOTES
OPHTHALMOLOGY ACUTE CLINIC NOTE  11/20/23  HPI       Eye Pain Right Eye      In right eye.  Pain was noted as 6/10.  Duration of 3 weeks.             Comments    Pt. States that she has had intermittent burning pain RE for the last 3 weeks. No pain LE. Has not had ant vision in RE for many years. No change in VA LE. Does not use any eye drops.   Veronica Sanders COT 12:23 PM November 20, 2023               Last edited by Veronica Sanders on 11/20/2023 12:23 PM.        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      Assessment & Plan      Moo Say is a 43 year old female who presents for    Phthisis Bulbi, Right Eye  Blind painful, right eye  ?Congenital glaucoma vs ?microphtalmic Right Eye  - Endorses a long history of no light perception in the right eye, though states that her vision was just blurry in this eye as a child. On exam, NLP, pressure is 03 mmHg. 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 States that she has had moderate pain for the past three weeks. No evidence of infection or acute process on exam. Does not feel that the proparacaine drops improved her discomfort.   - r/b/a of dilated exam reviewed with patient, patient deferred dilated exam left eye; therefore optos was obtained which looks wnl, will return for dilated exam    Plan:  - Discussed that  treatment aimed at comfort of the blind painful eye may be helpful in this case, but that definitive treatment would be evisceration/enucleation. Would recommend oculoplastics follow up for prognostication and planning.   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     Counseled return/RD/monocular precautions    Patient disposition:   Return for Follow Up with oculoplastics next available, or sooner changes. Patient will call to schedule appointment    Patient seen with Dr. Charlee Guadalupe MD  PGY-2, Ophthalmology  [facility]  11/20/23 1:29 PM    Attending Physician Attestation:  Complete documentation of historical and exam elements from today's encounter can be found in the full encounter summary report (not reduplicated in this progress note).  I personally obtained the chief complaint(s) and history of present illness.  I confirmed and edited as necessary the review of systems, past medical/surgical history, family history, social history, and examination findings as documented by others; and I examined the patient myself.  I personally reviewed the relevant tests, images, and reports as documented above.  I formulated and edited as necessary the assessment and plan and discussed the findings and management plan with the patient and family. . - Radha Deshpande MD

## 2023-11-20 NOTE — NURSING NOTE
Chief Complaints and History of Present Illnesses   Patient presents with    Eye Pain Right Eye     Chief Complaint(s) and History of Present Illness(es)       Eye Pain Right Eye              Laterality: In right eye    Pain scale: 6/10    Duration: 3 weeks              Comments    Pt. States that she has had intermittent burning pain RE for the last 3 weeks. No pain LE. Has not had ant vision in RE for many years. No change in VA LE. Does not use any eye drops.   Veronica Sanders COT 12:23 PM November 20, 2023

## 2023-11-21 NOTE — TELEPHONE ENCOUNTER
FUTURE VISIT INFORMATION      FUTURE VISIT INFORMATION:  Date: 2/13/24  Time: 8:00am  Location: [facility]  REFERRAL INFORMATION:  Referring provider:  Shayne Guadalupe MD   Referring providers clinic:  [facility]    RECORDS REQUESTED FROM:       Clinic name Comments Records Status Imaging Status   [facility] Ov/referral 11/20/23 EPIC

## 2023-12-06 NOTE — PROGRESS NOTES
Medication Therapy Management (MTM) Encounter    ASSESSMENT:                            Medication Adherence/Access: Recommended patient return to clinic with medications and med box to set up H pylori regimen.     H pylori/gastritis  Rescheduled patient for med box set up so that she can start her treatment course for H pylori. Reminded patient to bring pillbox and all 4 medications.     Depression/anxiety  Continue current medications, provided patient with printout to get medications refilled at the pharmacy.     Migraines/pain  Advised patient to  refill of sumatriptan and try 100 mg dose as 50 mg was not effective two days ago. Reviewed that max dose is 4 tablets/day. Consider dose increase at future visits if patient finds 100 mg dose effective.     Right eye pain  Counseled patient to trial artificial tears three times daily for dry eye symptoms, though patient will likely need surgery as ophthalmology recommended.     PLAN:                             refills of sumatriptan and try 2 tablets to relieve headache.   Bring all of your medications and pillbox to next visit.    refills of sertraline and hydroxyzine and continue taking.     Medication issues to be addressed at a future visit:   H pylori med box set up   Sumatriptan dose increase?    Follow-up: Return in about 1 day (around 12/8/2023) for with PharmD.    SUBJECTIVE/OBJECTIVE:                          Ed Rangel is a 43 year old female coming in for an initial visit. She was referred to me from Dr Hernandez/Cesar for H pylori med box set up. Patient seen with AISHA Kramer .     Reason for visit: Med box set up    Allergies/ADRs: Reviewed in chart  Past Medical History: Reviewed in chart  Tobacco: She reports that she has never smoked. She has quit using smokeless tobacco.  Alcohol: none    Medication Adherence/Access: Patient takes medications directly from bottles.  Patient takes medications 1 time(s) per day. Patient  forgot to bring her medication vials today.     H pylori/gastritis  Famotidine 20 mg twice daily   Omeprazole 20 mg twice daily x 14 days  Doxycycline 100 mg twice daily x 14 days  Metronidazole 500 mg three times daily x 14 days  Bismuth subsalicylate 524 mg four times daily x 14 days  Does not currently have heartburn, has picked up H pylori medications though has not started taking them. Not currently using famotidine though does use as needed. Has a four times daily pillbox at home.     Depression/anxiety  Sertraline 50 mg daily  Hydroxyzine 25 mg three times daily as needed   Her anxiety is feeling better, she is running low on sertraline.     Migraines/pain  Acetaminophen 325-650 mg every 6 hours as needed  Diclofenac gel 1% 4 g four times daily as needed   Sumatriptan 50 mg as needed   Patient has headache today, has had for 3-4 days. Knows to take sumatriptan when she has headaches, but sometimes it doesn't get better. Last time she took sumatriptan was 2 days ago, still has a headache today. She takes sumatriptan a maximum of 1-2 days per week.     Eye pain/dry eyes:   Pataday 1 drop both eyes daily  Ketofiten 0.025% 1 drop both eyes twice daily -not taking  Artificial tears 1 drop both eyes as needed   Eye drops do not seem to help, using both Pataday and artificial tears in right eye. Eye feels dry, though not as itchy. Using drops twice daily and separates the two kinds by at least an hour. Ophthalmology recommended surgery to remove right eye.     Today's Vitals: /80   Pulse 63   Resp 18   Wt 198 lb 9.6 oz (90.1 kg)   LMP 11/17/2023 (Exact Date)   SpO2 98%   BMI 34.90 kg/m    ----------------  I spent 20 minutes with this patient today. Dr. Guerrero (resident physician) was provided the recommendations above  via routed note and Dr. Rankin is the authorizing prescriber for this visit through the pharmacist collaborative practice agreement.. A copy of the visit note was provided to the patient's  provider(s).    A summary of these recommendations was given to the patient.    Yomaira Bird, PharmMarcelinaD.  Medication Therapy Management Pharmacy Resident        Medication Therapy Recommendations  Migraine    Current Medication: SUMAtriptan (IMITREX) 50 MG tablet   Rationale: Does not understand instructions - Adherence - Adherence   Recommendation: Provide Education   Status: Patient Agreed - Adherence/Education

## 2023-12-07 ENCOUNTER — OFFICE VISIT (OUTPATIENT)
Dept: PHARMACY | Facility: CLINIC | Age: 43
End: 2023-12-07
Payer: COMMERCIAL

## 2023-12-07 VITALS
HEART RATE: 63 BPM | BODY MASS INDEX: 34.9 KG/M2 | WEIGHT: 198.6 LBS | OXYGEN SATURATION: 98 % | RESPIRATION RATE: 18 BRPM | DIASTOLIC BLOOD PRESSURE: 80 MMHG | SYSTOLIC BLOOD PRESSURE: 126 MMHG

## 2023-12-07 DIAGNOSIS — K25.3 GASTRIC ULCER DUE TO HELICOBACTER PYLORI, ACUTE: ICD-10-CM

## 2023-12-07 DIAGNOSIS — B96.81 GASTRIC ULCER DUE TO HELICOBACTER PYLORI, ACUTE: ICD-10-CM

## 2023-12-07 DIAGNOSIS — H44.511 ABSOLUTE GLAUCOMA, RIGHT EYE: ICD-10-CM

## 2023-12-07 DIAGNOSIS — F33.1 MODERATE EPISODE OF RECURRENT MAJOR DEPRESSIVE DISORDER (H): ICD-10-CM

## 2023-12-07 DIAGNOSIS — G43.009 MIGRAINE WITHOUT AURA AND WITHOUT STATUS MIGRAINOSUS, NOT INTRACTABLE: Primary | ICD-10-CM

## 2023-12-07 PROCEDURE — 99605 MTMS BY PHARM NP 15 MIN: CPT | Performed by: PHARMACIST

## 2023-12-07 PROCEDURE — 99607 MTMS BY PHARM ADDL 15 MIN: CPT | Performed by: PHARMACIST

## 2023-12-07 NOTE — Clinical Note
Raymond Ward Say forgot to bring her medications for set up, but she was struggling with a headache today. We reviewed that she can use up to 100 mg of sumatriptan for her headaches a max of twice daily. If she finds that effective at next visit, maybe you could send in the higher strength tablets for her. Dr. Simpson will set up her medbox tomorrow.   Yomaira

## 2023-12-07 NOTE — PROGRESS NOTES
I have verified the content of the note, which accurately reflects my assessment of the patient and the plan of care.   Petra Simpson, [facility], PharmD

## 2023-12-07 NOTE — PATIENT INSTRUCTIONS
"Recommendations from today's MTM visit:                                                    [facility] pharmacy: Please refill sumatriptan, sertraline, hydroxyzine tablets for patient.  Take 2 tablets of headache medicine when you get home    2. Bring all of your medications and your pillbox to your next visit  .     Follow-up: Return in about 1 day (around 12/8/2023) for with PharmD.    It was great speaking with you today.  I value your experience and would be very thankful for your time in providing feedback in our clinic survey. In the next few days, you may receive an email or text message from [employer] with a link to a survey related to your  clinical pharmacist.\"     To schedule another MTM appointment, please call the clinic directly or you may call the MTM scheduling line at [phone] or toll-free at [phone].     My Clinical Pharmacist's contact information:                                                      Please feel free to contact me with any questions or concerns you have.      Yomaira Bird, Pharm.D.  Medication Therapy Management Pharmacy Resident   "

## 2023-12-07 NOTE — PROGRESS NOTES
Clinical Pharmacy Consult:                                                    Ed Rangel is a 43 year old female seen for a clinical pharmacist consult.  She was referred to me from Dr Hernandez.  Patient was seen with a lanugage line .    Reason for Consult: H. Pylori medication set up.     Discussion: Patient is here for education and to set up a pill box for H pylori infection. She is being treated with bismuth quadruple therapy. Med box was set up for x 2 weeks as below.  Patient did not have enough bismuth with her (only had 22 tablets) but patient endorsed having more at home and understands how to purt them in the pillbox.     AM Midday Evening Bedtime   Doxycycline 100mg  x1  Metronidazole 500 mg x1  Omeprazole 20mg x1  Bismuth subsalicylate 262 mg x 2 Metronidazole 500 mg x1  Bismuth subsalicylate 262 mg x 2   Doxycycline 100mg  x1  Metronidazole 500 mg x1  Omeprazole 20mg x1  Bismuth subsalicylate 262 mg x 2 Bismuth subsalicylate 262 mg x 2     Plan:  1. Take all medications as directed.      Petra Simpson, LichaD

## 2023-12-08 ENCOUNTER — OFFICE VISIT (OUTPATIENT)
Dept: PHARMACY | Facility: CLINIC | Age: 43
End: 2023-12-08
Payer: COMMERCIAL

## 2023-12-08 DIAGNOSIS — A04.8 H. PYLORI INFECTION: Primary | ICD-10-CM

## 2023-12-08 PROCEDURE — 99207 PR NO CHARGE LOS: CPT | Performed by: PHARMACIST

## 2023-12-21 ENCOUNTER — OFFICE VISIT (OUTPATIENT)
Dept: FAMILY MEDICINE | Facility: CLINIC | Age: 43
End: 2023-12-21
Payer: COMMERCIAL

## 2023-12-21 VITALS
SYSTOLIC BLOOD PRESSURE: 119 MMHG | TEMPERATURE: 98.3 F | HEIGHT: 64 IN | DIASTOLIC BLOOD PRESSURE: 75 MMHG | HEART RATE: 51 BPM | BODY MASS INDEX: 34.42 KG/M2 | RESPIRATION RATE: 16 BRPM | OXYGEN SATURATION: 100 % | WEIGHT: 201.6 LBS

## 2023-12-21 DIAGNOSIS — H57.11 OCULAR PAIN, RIGHT EYE: ICD-10-CM

## 2023-12-21 DIAGNOSIS — A04.8 H. PYLORI INFECTION: ICD-10-CM

## 2023-12-21 DIAGNOSIS — F33.1 MODERATE EPISODE OF RECURRENT MAJOR DEPRESSIVE DISORDER (H): ICD-10-CM

## 2023-12-21 DIAGNOSIS — Z00.00 ROUTINE GENERAL MEDICAL EXAMINATION AT A HEALTH CARE FACILITY: Primary | ICD-10-CM

## 2023-12-21 PROBLEM — Z34.90 PREGNANT: Status: ACTIVE | Noted: 2018-11-21

## 2023-12-21 PROBLEM — O14.00 MILD PREECLAMPSIA: Status: ACTIVE | Noted: 2019-06-20

## 2023-12-21 PROCEDURE — 99396 PREV VISIT EST AGE 40-64: CPT | Mod: GC

## 2023-12-21 PROCEDURE — 36415 COLL VENOUS BLD VENIPUNCTURE: CPT

## 2023-12-21 PROCEDURE — 80061 LIPID PANEL: CPT

## 2023-12-21 ASSESSMENT — ENCOUNTER SYMPTOMS
SHORTNESS OF BREATH: 0
MYALGIAS: 0
DIARRHEA: 0
HEMATURIA: 0
JOINT SWELLING: 0
ARTHRALGIAS: 0
PARESTHESIAS: 0
ABDOMINAL PAIN: 0
HEADACHES: 0
WEAKNESS: 0
FREQUENCY: 0
FEVER: 0
HEARTBURN: 0
NAUSEA: 0
EYE PAIN: 1
PALPITATIONS: 0
CONSTIPATION: 0
COUGH: 0
DYSURIA: 0
CHILLS: 0
NERVOUS/ANXIOUS: 1
SORE THROAT: 0
BREAST MASS: 0
DIZZINESS: 0
HEMATOCHEZIA: 0

## 2023-12-21 ASSESSMENT — ANXIETY QUESTIONNAIRES
6. BECOMING EASILY ANNOYED OR IRRITABLE: SEVERAL DAYS
5. BEING SO RESTLESS THAT IT IS HARD TO SIT STILL: NOT AT ALL
GAD7 TOTAL SCORE: 2
GAD7 TOTAL SCORE: 2
7. FEELING AFRAID AS IF SOMETHING AWFUL MIGHT HAPPEN: NOT AT ALL
IF YOU CHECKED OFF ANY PROBLEMS ON THIS QUESTIONNAIRE, HOW DIFFICULT HAVE THESE PROBLEMS MADE IT FOR YOU TO DO YOUR WORK, TAKE CARE OF THINGS AT HOME, OR GET ALONG WITH OTHER PEOPLE: SOMEWHAT DIFFICULT
1. FEELING NERVOUS, ANXIOUS, OR ON EDGE: SEVERAL DAYS
2. NOT BEING ABLE TO STOP OR CONTROL WORRYING: NOT AT ALL
3. WORRYING TOO MUCH ABOUT DIFFERENT THINGS: NOT AT ALL

## 2023-12-21 ASSESSMENT — PATIENT HEALTH QUESTIONNAIRE - PHQ9
5. POOR APPETITE OR OVEREATING: NOT AT ALL
SUM OF ALL RESPONSES TO PHQ QUESTIONS 1-9: 3

## 2023-12-21 NOTE — PROGRESS NOTES
SUBJECTIVE:   Ed is a 43 year old, presenting for the following:  Physical        12/21/2023     8:08 AM   Additional Questions   Roomed by Ioana       Healthy Habits:     Getting at least 3 servings of Calcium per day:  NO    Bi-annual eye exam:  Yes    Dental care twice a year:  NO    Sleep apnea or symptoms of sleep apnea:  Daytime drowsiness    Diet:  Regular (no restrictions)    Frequency of exercise:  2-3 days/week    Duration of exercise:  Less than 15 minutes    Taking medications regularly:  Yes    Medication side effects:  None    Additional concerns today:  No        Eye Problem  Duration of complaint: Right eye pain   Saw Ophthalmology and actually recommending surgical removal of eye. She is uncertain of proceeding with surgery. She is using eye lubricant several times per day.   Mental Health Symptoms    Duration: Several years  Description:  Depression: YES  Anxiety: YES  Sleep problems: YES  Concentration problems: YES  Therapies tried and outcome: Zoloft (Sertraline)    See PHQ-9 and KAYLAH scores, as appropriate     Have you ever done Advance Care Planning? (For example, a Health Directive, POLST, or a discussion with a medical provider or your loved ones about your wishes): No, advance care planning information given to patient to review.  Patient declined advance care planning discussion at this time.    Social History     Tobacco Use    Smoking status: Never    Smokeless tobacco: Former    Tobacco comments:     Chews betel nut up to 2-3 x's per week   Substance Use Topics    Alcohol use: No           12/21/2023     8:04 AM   Alcohol Use   Prescreen: >3 drinks/day or >7 drinks/week? Not Applicable     Reviewed orders with patient.  Reviewed health maintenance and updated orders accordingly - Yes  Lab work is in process    Breast Cancer Screening:  Any new diagnosis of family breast, ovarian, or bowel cancer? No    FHS-7:       5/18/2023    10:10 AM 5/18/2023    10:11 AM   Breast CA Risk  Assessment (FHS-7)   Did any of your first-degree relatives have breast or ovarian cancer? No No   Did any of your relatives have bilateral breast cancer? No No   Did any man in your family have breast cancer? No No   Did any woman in your family have breast and ovarian cancer? No No   Did any woman in your family have breast cancer before age 50 y? No No   Do you have 2 or more relatives with breast and/or ovarian cancer? No    Do you have 2 or more relatives with breast and/or bowel cancer? No        Mammogram Screening: Recommended annual mammography  Pertinent mammograms are reviewed under the imaging tab.    History of abnormal Pap smear: NO - age 30-65 PAP every 5 years with negative HPV co-testing recommended      Latest Ref Rng & Units 11/21/2018    11:42 AM   PAP / HPV   HPV 16 DNA NEG Negative    HPV 18 DNA NEG Negative    Other HR HPV NEG Negative      Reviewed and updated as needed this visit by clinical staff    Allergies  Meds   Med Hx  Surg Hx  Fam Hx          Reviewed and updated as needed this visit by Provider                 Past Medical History:   Diagnosis Date    Decreased vision of right eye     Since baby    Migraine     Chronic, one controller and one as needed medications    Migraines     Miscarriage 11/18/2016      Past Surgical History:   Procedure Laterality Date    NO HISTORY OF SURGERY         Review of Systems   Constitutional:  Negative for chills and fever.   HENT:  Negative for congestion, ear pain, hearing loss and sore throat.    Eyes:  Positive for pain. Negative for visual disturbance.   Respiratory:  Negative for cough and shortness of breath.    Cardiovascular:  Negative for chest pain, palpitations and peripheral edema.   Gastrointestinal:  Negative for abdominal pain, constipation, diarrhea, heartburn, hematochezia and nausea.   Breasts:  Negative for tenderness, breast mass and discharge.   Genitourinary:  Negative for dysuria, frequency, genital sores, hematuria,  "pelvic pain, urgency, vaginal bleeding and vaginal discharge.   Musculoskeletal:  Negative for arthralgias, joint swelling and myalgias.   Skin:  Negative for rash.   Neurological:  Negative for dizziness, weakness, headaches and paresthesias.   Psychiatric/Behavioral:  Negative for mood changes. The patient is nervous/anxious.      CONSTITUTIONAL: NEGATIVE for fever, chills, change in weight  INTEGUMENTARU/SKIN: NEGATIVE for worrisome rashes, moles or lesions  EYES: NEGATIVE for vision changes or irritation. +Right eye pain and blindness.   ENT: NEGATIVE for ear, mouth and throat problems  RESP: NEGATIVE for significant cough or SOB  BREAST: NEGATIVE for masses, tenderness or discharge  CV: NEGATIVE for chest pain, palpitations or peripheral edema  GI: NEGATIVE for nausea, abdominal pain, heartburn, or change in bowel habits  : NEGATIVE for unusual urinary or vaginal symptoms. Periods are regular.  MUSCULOSKELETAL: NEGATIVE for significant arthralgias or myalgia  NEURO: NEGATIVE for weakness, dizziness or paresthesias  PSYCHIATRIC: NEGATIVE for changes in mood or affect     OBJECTIVE:   /75   Pulse 51   Temp 98.3  F (36.8  C)   Resp 16   Ht 1.626 m (5' 4\")   Wt 91.4 kg (201 lb 9.6 oz)   LMP 11/17/2023 (Exact Date)   SpO2 100%   BMI 34.60 kg/m    Physical Exam  GENERAL: healthy, alert and no distress  EYES: Eyes grossly normal to inspection and right eye held more closed, pupil abnormal.   HENT: ear canals and TM's normal, nose and mouth without ulcers or lesions  NECK: no adenopathy, no asymmetry, masses, or scars and thyroid normal to palpation  RESP: lungs clear to auscultation - no rales, rhonchi or wheezes  CV: regular rate and rhythm, normal S1 S2, no S3 or S4, no murmur, click or rub, no peripheral edema and peripheral pulses strong  ABDOMEN: soft, nontender, no hepatosplenomegaly, no masses and bowel sounds normal  MS: no gross musculoskeletal defects noted, no edema  SKIN: no suspicious " "lesions or rashes  NEURO: Normal strength and tone, mentation intact and speech normal  PSYCH: mentation appears normal, affect normal/bright    Diagnostic Test Results:  Labs reviewed in Epic    ASSESSMENT/PLAN:   (Z00.00) Routine general medical examination at a health care facility  (primary encounter diagnosis)  Comment: Would like to schedule PAP, mammogram, colonoscopy in the spring.   Plan: Lipid panel reflex to direct LDL Fasting    (A04.8) H. pylori infection  Comment: Not taking medications. Told her to stop and follow up after she returns from [place] to re-test and start medications  Plan: Follow up in 2-3 weeks when returns from travel. Stop medications.     (F33.1) Moderate episode of recurrent major depressive disorder (H)  Comment: PHQ9 score 3.   Plan: Continue Sertraline     (H57.11) Ocular pain, right eye  Comment: Followed with Ophthalmology in November. Planning for surgical intervention in February.   Plan: Follow up optho as recommended    Patient has been advised of split billing requirements and indicates understanding: Yes      COUNSELING:  Reviewed preventive health counseling, as reflected in patient instructions       Regular exercise       Healthy diet/nutrition       Osteoporosis prevention/bone health       Colorectal Cancer Screening       Advance Care Planning      BMI:   Estimated body mass index is 34.6 kg/m  as calculated from the following:    Height as of this encounter: 1.626 m (5' 4\").    Weight as of this encounter: 91.4 kg (201 lb 9.6 oz).   Weight management plan: Discussed healthy diet and exercise guidelines      She reports that she has never smoked. She has quit using smokeless tobacco.        Puja Guerrero MD  [facility]  "

## 2023-12-22 LAB
CHOLEST SERPL-MCNC: 203 MG/DL
FASTING STATUS PATIENT QL REPORTED: YES
HDLC SERPL-MCNC: 34 MG/DL
LDLC SERPL CALC-MCNC: 132 MG/DL
NONHDLC SERPL-MCNC: 169 MG/DL
TRIGL SERPL-MCNC: 187 MG/DL

## 2024-01-12 ENCOUNTER — OFFICE VISIT (OUTPATIENT)
Dept: FAMILY MEDICINE | Facility: CLINIC | Age: 44
End: 2024-01-12
Payer: COMMERCIAL

## 2024-01-12 VITALS
DIASTOLIC BLOOD PRESSURE: 60 MMHG | HEIGHT: 64 IN | OXYGEN SATURATION: 98 % | RESPIRATION RATE: 18 BRPM | TEMPERATURE: 98.2 F | WEIGHT: 202.6 LBS | SYSTOLIC BLOOD PRESSURE: 98 MMHG | HEART RATE: 60 BPM | BODY MASS INDEX: 34.59 KG/M2

## 2024-01-12 DIAGNOSIS — Z12.4 CERVICAL CANCER SCREENING: ICD-10-CM

## 2024-01-12 DIAGNOSIS — Z12.11 SCREEN FOR COLON CANCER: Primary | ICD-10-CM

## 2024-01-12 DIAGNOSIS — G43.009 MIGRAINE WITHOUT AURA AND WITHOUT STATUS MIGRAINOSUS, NOT INTRACTABLE: ICD-10-CM

## 2024-01-12 PROCEDURE — 99214 OFFICE O/P EST MOD 30 MIN: CPT | Mod: GC

## 2024-01-12 RX ORDER — OMEPRAZOLE 20 MG/1
20 TABLET, DELAYED RELEASE ORAL DAILY
COMMUNITY
End: 2024-04-01

## 2024-01-12 RX ORDER — SUMATRIPTAN 50 MG/1
50 TABLET, FILM COATED ORAL
Qty: 30 TABLET | Refills: 1 | Status: SHIPPED | OUTPATIENT
Start: 2024-01-12 | End: 2024-05-14

## 2024-01-12 RX ORDER — DOXYCYCLINE HYCLATE 100 MG
100 TABLET ORAL 2 TIMES DAILY
COMMUNITY
End: 2024-04-01

## 2024-01-12 RX ORDER — METRONIDAZOLE 500 MG/1
500 TABLET ORAL 3 TIMES DAILY
COMMUNITY
End: 2024-04-01

## 2024-01-12 ASSESSMENT — ANXIETY QUESTIONNAIRES
GAD7 TOTAL SCORE: 0
IF YOU CHECKED OFF ANY PROBLEMS ON THIS QUESTIONNAIRE, HOW DIFFICULT HAVE THESE PROBLEMS MADE IT FOR YOU TO DO YOUR WORK, TAKE CARE OF THINGS AT HOME, OR GET ALONG WITH OTHER PEOPLE: SOMEWHAT DIFFICULT
IF YOU CHECKED OFF ANY PROBLEMS ON THIS QUESTIONNAIRE, HOW DIFFICULT HAVE THESE PROBLEMS MADE IT FOR YOU TO DO YOUR WORK, TAKE CARE OF THINGS AT HOME, OR GET ALONG WITH OTHER PEOPLE: NOT DIFFICULT AT ALL
5. BEING SO RESTLESS THAT IT IS HARD TO SIT STILL: SEVERAL DAYS
7. FEELING AFRAID AS IF SOMETHING AWFUL MIGHT HAPPEN: SEVERAL DAYS
1. FEELING NERVOUS, ANXIOUS, OR ON EDGE: SEVERAL DAYS
6. BECOMING EASILY ANNOYED OR IRRITABLE: SEVERAL DAYS
3. WORRYING TOO MUCH ABOUT DIFFERENT THINGS: NOT AT ALL
2. NOT BEING ABLE TO STOP OR CONTROL WORRYING: NOT AT ALL
GAD7 TOTAL SCORE: 5
2. NOT BEING ABLE TO STOP OR CONTROL WORRYING: NOT AT ALL
3. WORRYING TOO MUCH ABOUT DIFFERENT THINGS: NOT AT ALL
6. BECOMING EASILY ANNOYED OR IRRITABLE: NOT AT ALL
7. FEELING AFRAID AS IF SOMETHING AWFUL MIGHT HAPPEN: NOT AT ALL
GAD7 TOTAL SCORE: 0
5. BEING SO RESTLESS THAT IT IS HARD TO SIT STILL: NOT AT ALL
1. FEELING NERVOUS, ANXIOUS, OR ON EDGE: NOT AT ALL

## 2024-01-12 ASSESSMENT — PATIENT HEALTH QUESTIONNAIRE - PHQ9
5. POOR APPETITE OR OVEREATING: NOT AT ALL
5. POOR APPETITE OR OVEREATING: SEVERAL DAYS
SUM OF ALL RESPONSES TO PHQ QUESTIONS 1-9: 6

## 2024-01-12 ASSESSMENT — ENCOUNTER SYMPTOMS
EYE DISCHARGE: 0
EYE ITCHING: 0
EYE PAIN: 1
CHILLS: 0
ABDOMINAL PAIN: 0
FEVER: 0
VOMITING: 0
NAUSEA: 0
CONSTIPATION: 0
DIARRHEA: 0
ABDOMINAL DISTENTION: 0
HEMATOCHEZIA: 0
HEARTBURN: 0
EYE REDNESS: 1

## 2024-01-12 NOTE — PROGRESS NOTES
Preceptor Attestation:    I discussed the patient with the resident and evaluated the patient in person. I have verified the content of the note, which accurately reflects my assessment of the patient and the plan of care.   Supervising Physician:  Brenda Patton MD

## 2024-01-12 NOTE — PROGRESS NOTES
Assessment & Plan     H. Pylori - Known H.pylori for two years treated with dual and quadruple therapy, most recently November 2023 of which she did not complete. Patient has completed dual therapy in May 2023. Has not been rechecked for H.pylori since then, reasonable to recheck and consider repeat quadruple or dual therapy given results.  -H.pylori antigen stool     Migraines - Patient with known history of migraines. Would like refill of her medication  -Sumatriptan 50 mg at onset of migraine    No follow-ups on file.    Puja Guerrero MD  [facility] CAROLINA Ward is a 43 year old, presenting for the following health issues:  Recheck Medication        1/12/2024     9:21 AM   Additional Questions   Roomed by Ioana ZHU     Ms. Rangel is a 43 y.o F who presents for evaluation of H. Pylori.     The patient was seen in 2022 and 2023 for positive H.pylori, she was initially on clarithromycin dual therapy, then switched to quadruple therapy in October of 2023. She stopped taking her medication more than two weeks ago because she was not taking them correctly. She is also not taking her omeprazole. Patient denies any abdominal pain, nausea, vomiting, diarrhea, constipation and heart burn.     Of note, patient has had right eye pain for one month. Patient was seen by ophthalmology 11/20/23 who recommended evisceration/enucleation treatment due to the lack of vision in the eye and pain. Had a referral for occuloplastics but didn't go. Was given pain medication in the form of eye drops. She would like further pain management.     Review of Systems   Constitutional:  Negative for chills and fever.   Eyes:  Positive for pain, redness and visual disturbance. Negative for discharge and itching.   Gastrointestinal:  Negative for abdominal distention, abdominal pain, constipation, diarrhea, heartburn, hematochezia, nausea and vomiting.         Objective    BP 98/60   Pulse 60   Temp 98.2  F  "(36.8  C)   Resp 18   Ht 1.626 m (5' 4\")   Wt 91.9 kg (202 lb 9.6 oz)   LMP 11/17/2023 (Exact Date)   SpO2 98%   BMI 34.78 kg/m    Body mass index is 34.78 kg/m .  Physical Exam  Constitutional:       Appearance: Normal appearance.   Eyes:      Comments: Right eye erythematous, squinting from pain    Pulmonary:      Effort: Pulmonary effort is normal.      Breath sounds: Normal breath sounds.   Abdominal:      General: Bowel sounds are normal.      Palpations: Abdomen is soft.   Musculoskeletal:         General: Normal range of motion.   Skin:     General: Skin is warm and dry.   Neurological:      General: No focal deficit present.      Mental Status: She is alert.   Psychiatric:         Mood and Affect: Mood normal.        Bianka Freitas  MS4  [facility]    ----- Services Performed by a MEDICAL STUDENT in Presence of RESIDENT/FELLOW Physician-------          I was present with the medical student, Bianka Freitas, MS4, who participated in the service and the documentation of the note.  I have verified the history and personally performed a physical exam and medical decision making, and have verified the content of the note.  I agree with the assessment and plan of care as documented in the note.    Puja Guerrero MD PGY-3  [facility]  1/12/2024      "

## 2024-01-15 PROCEDURE — 87338 HPYLORI STOOL AG IA: CPT

## 2024-01-16 LAB — H PYLORI AG STL QL IA: POSITIVE

## 2024-01-17 ENCOUNTER — DOCUMENTATION ONLY (OUTPATIENT)
Dept: PHARMACY | Facility: CLINIC | Age: 44
End: 2024-01-17
Payer: COMMERCIAL

## 2024-01-17 NOTE — PROGRESS NOTES
I was asked to provide input on salvage therapy for H. pylori infection for this patient.    Patient has a past history of twice taking clarithromycin triple therapy.  She also recently has a failed attempt at bismuth quadruple therapy, where she took approximately 1 week of this regimen inconsistently.  Based on her failing a clarithromycin based therapy and a metronidazole based therapy the best options for salvage therapy are levofloxacin triple therapy, rifabutin triple therapy, or high-dose dual.  Of these 3 options I would personally go with levofloxacin triple therapy which is a regular dose PPI twice daily, amoxicillin 1000 mg twice daily, and levofloxacin 500 mg daily.  Rifabutin would be my second option which is the same only the levofloxacin is switched out for rifabutin 300 mg daily.  I would not go with high-dose dual as this requires either 3 times a day or 4 times a day dosing of the amoxicillin.    Message to Dr. Guerrero for her consideration.    Petra Simpson, PharmD

## 2024-01-17 NOTE — Clinical Note
Dr Guerrero - this is what I think you should do with this patient.  She has a follow-up appointment on January 26 with you, unfortunately I am not in that day.  I wonder if we should go ahead and prescribe this now and have her reschedule for a pharmacist visit for us to set up a pillbox and go over how to take it. It would be really good to have an in-person  for this visit as I know I had a phone one the last time, which probably did not help her understanding.  Petra Simpson, PharmD

## 2024-01-18 DIAGNOSIS — A04.8 H. PYLORI INFECTION: Primary | ICD-10-CM

## 2024-01-18 RX ORDER — LEVOFLOXACIN 500 MG/1
500 TABLET, FILM COATED ORAL DAILY
Qty: 14 TABLET | Refills: 0 | Status: SHIPPED | OUTPATIENT
Start: 2024-01-18 | End: 2024-02-01

## 2024-01-18 RX ORDER — AMOXICILLIN 500 MG/1
1000 CAPSULE ORAL 2 TIMES DAILY
Qty: 56 CAPSULE | Refills: 0 | Status: SHIPPED | OUTPATIENT
Start: 2024-01-18 | End: 2024-02-01

## 2024-01-26 ENCOUNTER — OFFICE VISIT (OUTPATIENT)
Dept: FAMILY MEDICINE | Facility: CLINIC | Age: 44
End: 2024-01-26
Payer: COMMERCIAL

## 2024-01-26 VITALS
SYSTOLIC BLOOD PRESSURE: 107 MMHG | HEART RATE: 56 BPM | TEMPERATURE: 98 F | BODY MASS INDEX: 34.83 KG/M2 | OXYGEN SATURATION: 98 % | RESPIRATION RATE: 18 BRPM | WEIGHT: 204 LBS | HEIGHT: 64 IN | DIASTOLIC BLOOD PRESSURE: 73 MMHG

## 2024-01-26 DIAGNOSIS — A04.8 H. PYLORI INFECTION: Primary | ICD-10-CM

## 2024-01-26 PROCEDURE — 99214 OFFICE O/P EST MOD 30 MIN: CPT | Mod: GC

## 2024-01-26 NOTE — PROGRESS NOTES
"  Assessment & Plan     H. pylori infection  Test positive. She has previously failed initial therapy and did not complete quadruple therapy.   - Start Triple therapy with Amoxicillin, Levaquin, Omeprazole x 14 days - already ordered  - She will  medications and bring to Pharm D appt  - Test of cure in 3 months        No follow-ups on file.    Mae Ward is a 43 year old, presenting for the following health issues:  Follow Up (H pylori. Pt currently not taking any mediation)        1/26/2024     8:59 AM   Additional Questions   Roomed by ML   Accompanied by self     HPI     Patient following up today for results of h pylori testing. She continues to have dull intermittent abdominal pain. She denies nausea/vomiting. She denies fever, chills, cp, sob, urinary symptoms.      Review of Systems  Constitutional, HEENT, cardiovascular, pulmonary, gi and gu systems are negative, except as otherwise noted.      Objective    /73 (BP Location: Left arm, Patient Position: Sitting, Cuff Size: Adult Regular)   Pulse 56   Temp 98  F (36.7  C) (Oral)   Resp 18   Ht 1.626 m (5' 4\")   Wt 92.5 kg (204 lb)   LMP 01/01/2024 (Approximate)   SpO2 98%   BMI 35.02 kg/m    Body mass index is 35.02 kg/m .  Physical Exam   GENERAL: alert and no distress  RESP: lungs clear to auscultation - no rales, rhonchi or wheezes  CV: regular rate and rhythm, normal S1 S2, no S3 or S4, no murmur, click or rub, no peripheral edema  ABDOMEN: soft, nontender, no hepatosplenomegaly, no masses and bowel sounds normal  MS: no gross musculoskeletal defects noted, no edema            Signed Electronically by: Puja Guerrero MD    "

## 2024-01-30 NOTE — PROGRESS NOTES
Clinical Pharmacy Consult:                                                    Ed Rangel is a 43 year old female seen for a clinical pharmacist consult.  She was referred to me from Dr Guerrero.     Reason for Consult: H pylori medication set up and education    Discussion: Patient has a history of 2 failed attempts at therapy for H pylori. Failed options were standard triple and bismuth quadruple.  Patient is now being started on a quinolone-based triple therapy (amoxicillin 1000mg twice daily, levofloxacin 500mg daily and omeprazole 20mg twice daily).  Patient provided education on the importance of therapy completion today and medication box set up appropriately.      Medications set up accordingly -     AM Levofloxacin 500mg   Amoxicillin 1000mg (2 x 500mg)  Omeprazole 20mg    PM Amoxicillin 1000mg (2 x 500mg)  Omeprazole 20mg      Med box set up with suns on the morning boxes and stars for the evenings.  Explained that it is very important to take all of the medications until finished.  Set up alarm on her phone for 10am and 10pm to help her remember to take them.      Plan:  1. Take medications until gone  2. I will call patient next week to see how things are going.    Petra Simpson, LichaD    [Procedure: _________] : a [unfilled] procedure visit [FreeTextEntry1] : Pt is here for EGD for chronic dyspepsia [Endoscopy] : an endoscopy [FreeTextEntry2] : Pt is here for EGD for dyspepsia

## 2024-02-01 ENCOUNTER — OFFICE VISIT (OUTPATIENT)
Dept: PHARMACY | Facility: CLINIC | Age: 44
End: 2024-02-01
Payer: COMMERCIAL

## 2024-02-01 DIAGNOSIS — A04.8 H. PYLORI INFECTION: Primary | ICD-10-CM

## 2024-02-01 PROCEDURE — 99207 PR NO CHARGE LOS: CPT | Performed by: PHARMACIST

## 2024-02-08 ENCOUNTER — VIRTUAL VISIT (OUTPATIENT)
Dept: PHARMACY | Facility: CLINIC | Age: 44
End: 2024-02-08
Payer: COMMERCIAL

## 2024-02-08 DIAGNOSIS — A04.8 H. PYLORI INFECTION: Primary | ICD-10-CM

## 2024-02-08 PROCEDURE — 99207 PR NO CHARGE LOS: CPT | Mod: 93 | Performed by: PHARMACIST

## 2024-02-08 NOTE — Clinical Note
Raymond Guerrero (or whoever is covering!)-  Fyi only: pharmacy requires I route this note to you- patient seems to be adhering to H pylori regimen. Told her to schedule with physician in 2-3 months for test of cure  Yomaira

## 2024-02-08 NOTE — PROGRESS NOTES
I have verified the content of the note, which accurately reflects my assessment of the patient and the plan of care.   Petra Simpson, [facility], PharmD

## 2024-02-13 ENCOUNTER — PRE VISIT (OUTPATIENT)
Dept: OPHTHALMOLOGY | Facility: CLINIC | Age: 44
End: 2024-02-13

## 2024-02-13 NOTE — TELEPHONE ENCOUNTER
FUTURE VISIT INFORMATION      FUTURE VISIT INFORMATION:  Date: 4/30/24  Time: 9:30am  Location: [facility]  REFERRAL INFORMATION:  Referring provider:  Shayne Guadalupe MD   Referring providers clinic:  [facility]     RECORDS REQUESTED FROM:         Clinic name Comments Records Status Imaging Status   [facility] Ov/referral 11/20/23 EPIC

## 2024-02-21 ENCOUNTER — OFFICE VISIT (OUTPATIENT)
Dept: FAMILY MEDICINE | Facility: CLINIC | Age: 44
End: 2024-02-21
Payer: COMMERCIAL

## 2024-02-21 VITALS
HEART RATE: 55 BPM | BODY MASS INDEX: 35.1 KG/M2 | DIASTOLIC BLOOD PRESSURE: 78 MMHG | HEIGHT: 64 IN | TEMPERATURE: 98.9 F | SYSTOLIC BLOOD PRESSURE: 114 MMHG | OXYGEN SATURATION: 97 % | WEIGHT: 205.6 LBS | RESPIRATION RATE: 18 BRPM

## 2024-02-21 DIAGNOSIS — A04.8 H. PYLORI INFECTION: Primary | ICD-10-CM

## 2024-02-21 DIAGNOSIS — F33.1 MODERATE EPISODE OF RECURRENT MAJOR DEPRESSIVE DISORDER (H): ICD-10-CM

## 2024-02-21 PROCEDURE — 99214 OFFICE O/P EST MOD 30 MIN: CPT | Performed by: STUDENT IN AN ORGANIZED HEALTH CARE EDUCATION/TRAINING PROGRAM

## 2024-02-21 ASSESSMENT — PATIENT HEALTH QUESTIONNAIRE - PHQ9
10. IF YOU CHECKED OFF ANY PROBLEMS, HOW DIFFICULT HAVE THESE PROBLEMS MADE IT FOR YOU TO DO YOUR WORK, TAKE CARE OF THINGS AT HOME, OR GET ALONG WITH OTHER PEOPLE: NOT DIFFICULT AT ALL
SUM OF ALL RESPONSES TO PHQ QUESTIONS 1-9: 8
SUM OF ALL RESPONSES TO PHQ QUESTIONS 1-9: 8

## 2024-02-21 NOTE — PROGRESS NOTES
"  Assessment & Plan     H. pylori infection  Completed treatment 2/16. Will have patient retest 4 weeks after completion.     Moderate episode of recurrent major depressive disorder (H)  PHQ8. Patient has stopped sertaline for unknown time. Does feel like it was helpful. Will send in refill. Can reassess at follow up visit. No SI.   - sertraline (ZOLOFT) 50 MG tablet; Take 1 tablet (50 mg) by mouth daily                  No follow-ups on file.    Mae Browno is a 43 year old, presenting for the following health issues:  Follow Up (Hpylori)      2/21/2024     8:32 AM   Additional Questions   Roomed by ML   Accompanied by self         2/21/2024    Information    services provided? Yes   Tiburcio Kramer   Type of interpretation provided Face-to-face    name Ana Calloway    Ag[employer]   Patient took medications for H. Pylori 2/16. She is currently having no symptoms. She is not taking the PPI any longer.     She reports she is not taking any of her medications. She is unsure when she ran out of the sertaline. No negative side effects. Reports not having a \"heavy heart\" in general but does sometimes get scared with stressful life events. Sleep is okay. No recent stressors. Denies having consistent coping mechanisms. Will sometimes talk to friends.     Depression Followup  How are you doing with your depression since your last visit? Worsened   Are you having other symptoms that might be associated with depression? No  Have you had a significant life event?  No   Are you feeling anxious or having panic attacks?   No  Do you have any concerns with your use of alcohol or other drugs? No    Social History     Tobacco Use    Smoking status: Never    Smokeless tobacco: Former    Tobacco comments:     Chews betel nut up to 2-3 x's per week   Vaping Use    Vaping Use: Never used   Substance Use Topics    Alcohol use: No    Drug use: No         12/21/2023     9:17 AM " "1/12/2024     1:38 PM 2/21/2024     8:32 AM   PHQ   PHQ-9 Total Score 3 6 8   Q9: Thoughts of better off dead/self-harm past 2 weeks Not at all Not at all Not at all         12/21/2023     9:17 AM 1/12/2024     2:01 PM 1/12/2024     2:10 PM   KAYLAH-7 SCORE   Total Score 2 5 0                   Objective    /78 (BP Location: Right arm, Patient Position: Sitting, Cuff Size: Adult Regular)   Pulse 55   Temp 98.9  F (37.2  C) (Oral)   Resp 18   Ht 1.626 m (5' 4\")   Wt 93.3 kg (205 lb 9.6 oz)   LMP 02/10/2024 (Approximate)   SpO2 97%   BMI 35.29 kg/m    Body mass index is 35.29 kg/m .  Physical Exam  HENT:      Nose: Nose normal.      Mouth/Throat:      Mouth: Mucous membranes are moist.   Eyes:      Extraocular Movements: Extraocular movements intact.      Conjunctiva/sclera: Conjunctivae normal.   Cardiovascular:      Rate and Rhythm: Normal rate and regular rhythm.   Musculoskeletal:      Cervical back: Normal range of motion.   Skin:     General: Skin is warm.      Capillary Refill: Capillary refill takes less than 2 seconds.   Neurological:      General: No focal deficit present.      Mental Status: She is alert.                    Signed Electronically by: Brandan George MD    "

## 2024-03-08 ENCOUNTER — OFFICE VISIT (OUTPATIENT)
Dept: FAMILY MEDICINE | Facility: CLINIC | Age: 44
End: 2024-03-08
Payer: COMMERCIAL

## 2024-03-08 VITALS
DIASTOLIC BLOOD PRESSURE: 74 MMHG | BODY MASS INDEX: 35.07 KG/M2 | HEART RATE: 67 BPM | WEIGHT: 205.4 LBS | SYSTOLIC BLOOD PRESSURE: 108 MMHG | HEIGHT: 64 IN | RESPIRATION RATE: 18 BRPM | TEMPERATURE: 98.1 F | OXYGEN SATURATION: 98 %

## 2024-03-08 DIAGNOSIS — N89.8 VAGINAL ITCHING: ICD-10-CM

## 2024-03-08 DIAGNOSIS — Z12.11 SCREEN FOR COLON CANCER: Primary | ICD-10-CM

## 2024-03-08 LAB
CLUE CELLS: ABNORMAL
TRICHOMONAS, WET PREP: ABNORMAL
WBC'S/HIGH POWER FIELD, WET PREP: ABNORMAL
YEAST, WET PREP: ABNORMAL

## 2024-03-08 PROCEDURE — 99213 OFFICE O/P EST LOW 20 MIN: CPT | Mod: GC

## 2024-03-08 PROCEDURE — 87210 SMEAR WET MOUNT SALINE/INK: CPT

## 2024-03-08 NOTE — PROGRESS NOTES
"  Assessment & Plan     Vaginal itching  43-year-old female presenting for vaginal itching for 4 days duration without other symptoms.  No urinary symptoms.  Vitally stable and exam unremarkable.  Wet prep obtained and will treat as indicated as patient had to leave to go take her  to work.  Will call patient with results.  - Wet preparation    Screen for colon cancer  - Fecal colorectal cancer screen FIT; Future  - Fecal colorectal cancer screen FIT    Return if symptoms worsen or fail to improve.    Subjective   Moo is a 43 year old, presenting for the following health issues:  Vaginal Problem (Very Itching, no discharge, burning or pain, started 4 days ago. )    HPI     Vaginal Symptoms  Onset/Duration: 4 days  Description:  Vaginal Discharge: none   Itching (Pruritis): YES  Burning sensation:  No  Odor: No  Accompanying Signs & Symptoms:  Urinary symptoms: No  Abdominal pain: No  Fever: No  History:   Sexually active: YES  New Partner: No  Possibility of Pregnancy:  No - on monthly periods.   Recent antibiotic use: No  Previous vaginitis issues: No  Precipitating or alleviating factors: None  Therapies tried and outcome: none      Review of Systems  Constitutional, HEENT, cardiovascular, pulmonary, gi and gu systems are negative, except as otherwise noted.      Objective    /74 (BP Location: Right arm, Patient Position: Sitting, Cuff Size: Adult Regular)   Pulse 67   Temp 98.1  F (36.7  C) (Oral)   Resp 18   Ht 1.626 m (5' 4\")   Wt 93.2 kg (205 lb 6.4 oz)   LMP 02/10/2024 (Approximate)   SpO2 98%   BMI 35.26 kg/m    Body mass index is 35.26 kg/m .  Physical Exam   GENERAL: alert and no distress  NECK: no adenopathy, no asymmetry, masses, or scars  RESP: lungs clear to auscultation - no rales, rhonchi or wheezes  CV: regular rate and rhythm, normal S1 S2, no S3 or S4, no murmur, click or rub, no peripheral edema  ABDOMEN: soft, nontender, no hepatosplenomegaly, no masses and bowel sounds " normal  MS: no gross musculoskeletal defects noted, no edema    No results found for this or any previous visit (from the past 24 hour(s)).        Signed Electronically by: Scar Schumacher DO

## 2024-03-09 PROCEDURE — 82274 ASSAY TEST FOR BLOOD FECAL: CPT

## 2024-03-12 LAB — HEMOCCULT STL QL IA: NEGATIVE

## 2024-03-25 ENCOUNTER — TELEPHONE (OUTPATIENT)
Dept: OPHTHALMOLOGY | Facility: CLINIC | Age: 44
End: 2024-03-25

## 2024-03-25 ENCOUNTER — OFFICE VISIT (OUTPATIENT)
Dept: FAMILY MEDICINE | Facility: CLINIC | Age: 44
End: 2024-03-25
Payer: COMMERCIAL

## 2024-03-25 VITALS
DIASTOLIC BLOOD PRESSURE: 76 MMHG | SYSTOLIC BLOOD PRESSURE: 116 MMHG | OXYGEN SATURATION: 97 % | WEIGHT: 206.8 LBS | HEIGHT: 63 IN | RESPIRATION RATE: 16 BRPM | TEMPERATURE: 98.3 F | HEART RATE: 61 BPM | BODY MASS INDEX: 36.64 KG/M2

## 2024-03-25 DIAGNOSIS — N94.6 MENSTRUAL PAIN: ICD-10-CM

## 2024-03-25 DIAGNOSIS — Z12.4 CERVICAL CANCER SCREENING: Primary | ICD-10-CM

## 2024-03-25 PROCEDURE — 99212 OFFICE O/P EST SF 10 MIN: CPT | Mod: GC

## 2024-03-25 NOTE — PATIENT INSTRUCTIONS
Thank you for trusting us with your care.     Here's a summary of the visit today:   Start taking ibuprofen for menstrual pain    Follow up in 1 week for pap smear            Thank you.     Dr. Villalpando

## 2024-03-25 NOTE — TELEPHONE ENCOUNTER
Spoke with patient regarding a sooner appointment from the wait-list. [facility] does not have transportation. -Per Patient via  Call

## 2024-03-25 NOTE — PROGRESS NOTES
"  Assessment & Plan     Cervical cancer screening  Patient deffered to another visit as she is on her period. Discussed scheduling in 1 week for pap smear. Patient expressed understanding.   - Pap Screen with HPV - recommended age 30 - 65 years; follow up in 1 week after completion of menstrual cycle     Menstrual pain  Patient endorses menstruation related abdominal and back pain which seems to be responding to OTC ibuprofen.declined Rx today.          Patient Instructions   Thank you for trusting us with your care.     Here's a summary of the visit today:   Start taking ibuprofen for menstrual pain    Follow up in 1 week for pap smear            Thank you.     Dr. Villalpando      Return in about 1 week (around 4/1/2024).    Subjective   Moo is a 44 year old, presenting for the following health issues:  Gyn Exam    HPI     Patient presents for pap smear.     Last completed in 2018. Normal.     Patient would like to come back at another visit to complete pap smear as she is currently on her period. Reports mesntraual related  back and abdomen pain. Has tried otc ibuprofen with relief.           Objective    /76   Pulse 61   Temp 98.3  F (36.8  C) (Oral)   Resp 16   Ht 1.603 m (5' 3.11\")   Wt 93.8 kg (206 lb 12.8 oz)   LMP 02/10/2024 (Approximate)   SpO2 97%   BMI 36.51 kg/m    Body mass index is 36.51 kg/m .  Physical Exam   GENERAL: alert and no distress  NECK: no adenopathy, no asymmetry, masses, or scars  RESP: lungs clear to auscultation - no rales, rhonchi or wheezes  CV: regular rate and rhythm, normal S1 S2, no S3 or S4, no murmur, click or rub, no peripheral edema  ABDOMEN: soft, nontender, no hepatosplenomegaly, no masses and bowel sounds normal  MS: no gross musculoskeletal defects noted, no edema              "

## 2024-04-01 ENCOUNTER — OFFICE VISIT (OUTPATIENT)
Dept: FAMILY MEDICINE | Facility: CLINIC | Age: 44
End: 2024-04-01
Payer: COMMERCIAL

## 2024-04-01 VITALS
HEIGHT: 64 IN | RESPIRATION RATE: 20 BRPM | TEMPERATURE: 98.6 F | DIASTOLIC BLOOD PRESSURE: 70 MMHG | BODY MASS INDEX: 35.41 KG/M2 | WEIGHT: 207.4 LBS | OXYGEN SATURATION: 97 % | SYSTOLIC BLOOD PRESSURE: 100 MMHG | HEART RATE: 59 BPM

## 2024-04-01 DIAGNOSIS — Z12.4 CERVICAL CANCER SCREENING: Primary | ICD-10-CM

## 2024-04-01 DIAGNOSIS — Z30.09 BIRTH CONTROL COUNSELING: ICD-10-CM

## 2024-04-01 PROCEDURE — G0145 SCR C/V CYTO,THINLAYER,RESCR: HCPCS

## 2024-04-01 PROCEDURE — 99213 OFFICE O/P EST LOW 20 MIN: CPT | Mod: GC

## 2024-04-01 PROCEDURE — 87624 HPV HI-RISK TYP POOLED RSLT: CPT

## 2024-04-01 NOTE — LETTER
April 9, 2024      Moo Say  [address] [address]        Dear Ms.Say,    We are writing to inform you of your test results.    Your routine cervical cancer screen, the pap exam, results were normal, no signs or concerns for cancer or other abnormalities.     Resulted Orders   Pap Screen with HPV - recommended age 30 - 65 years   Result Value Ref Range    Interpretation        Negative for Intraepithelial Lesion or Malignancy (NILM)    Comment         Papanicolaou Test Limitations:  Cervical cytology is a screening test with limited sensitivity, and regular screening is critical for cancer prevention.  Pap tests are primarily effective for the diagnosis/prevention of squamous cell carcinoma, not adenocarcinoma or other cancers.        Specimen Adequacy       Satisfactory for evaluation, endocervical/transformation zone component absent    Clinical Information       none      Reflex Testing Yes regardless of result     Previous Abnormal?       No      Performing Labs       The technical component of this testing was completed at [facility]     HPV High Risk Types DNA Cervical   Result Value Ref Range    Other HR HPV Negative Negative    HPV16 DNA Negative Negative    HPV18 DNA Negative Negative    FINAL DIAGNOSIS       This patient's sample is negative for HPV DNA.        This test was developed and its performance characteristics determined by the [facility]. It has not been cleared or approved by the FDA. The laboratory is regulated under CLIA as qualified to perform high-complexity testing. This test is used for clinical purposes. It should not be regarded as investigational or for research.    METHODOLOGY: The Roche Silva 4800 system uses automated extraction, simultaneous amplification of HPV (L1 region) and beta-globin, followed by real time detection of fluorescent labeled  HPV and beta globin using specific oligonucleotide probes. The test specifically identifies types HPV 16 DNA and HPV 18 DNA while concurrently detecting the rest of the high risk types (31, 33, 35, 39, 45, 51, 52, 56, 58, 59, 66 or 68).    COMMENTS: This test is not intended for use as a screening device for woman under age 30 with normal cervical cytology. Results should be correlated with cytologic and histologic findings. Close clinical followup is recommended.           If you have any questions or concerns, please call the clinic at the number listed above.       Sincerely,      Cadence Simmons MD

## 2024-04-01 NOTE — PROGRESS NOTES
"  Assessment & Plan     1. Cervical cancer screening  Last pap smear with HPV in 2018 was normal, HPV negative. No other concerns.  Normal on exam.  - Pap Screen with HPV - recommended age 30 - 65 years    2. Birth control counseling  Patient not on birth control with regular menses strongly wants to avoid pregnancy.  Discussed birth control options, patient declines options at this time due to previous trials with side effects or being nervous about options.  Patient reports understanding that she could get pregnant without a form of birth control.  She reports she will consider the options and make a follow-up appointment if desired.      Subjective   Ed is a 44 year old, presenting for the following health issues:  Repeat Pap Smear (Pap smear)    HPI     Presenting for routine pap smear.  Menses described plans regular, pain managed well with ibuprofen. No other concerns today.     Does not want pregnancy though also does not want to take birth control. Her preferred birth control is timing with her cycle. Discussed this as a not very effective method and there is still the risk of pregnancy. She reports she had a \"bad reaction\" to the pill and to depo. She reports she is \"scared\" of the other options such as Nexplanon or IUD.       Objective    /70   Pulse 59   Temp 98.6  F (37  C) (Oral)   Resp 20   Ht 1.618 m (5' 3.7\")   Wt 94.1 kg (207 lb 6.4 oz)   SpO2 97%   BMI 35.94 kg/m    Body mass index is 35.94 kg/m .  Physical Exam  Constitutional:       Appearance: Normal appearance.   Pulmonary:      Effort: Pulmonary effort is normal.   Genitourinary:     General: Normal vulva.      Vagina: Normal.      Cervix: Normal.   Neurological:      Mental Status: She is alert and oriented to person, place, and time.   Psychiatric:         Mood and Affect: Mood normal.         Behavior: Behavior normal.                Signed Electronically by: Cadence Simmons MD    "

## 2024-04-03 LAB
BKR LAB AP GYN ADEQUACY: NORMAL
BKR LAB AP GYN INTERPRETATION: NORMAL
BKR LAB AP HPV REFLEX: NORMAL
BKR LAB AP PREVIOUS ABNORMAL: NORMAL
PATH REPORT.COMMENTS IMP SPEC: NORMAL
PATH REPORT.COMMENTS IMP SPEC: NORMAL
PATH REPORT.RELEVANT HX SPEC: NORMAL

## 2024-04-03 NOTE — PROGRESS NOTES
Physician Attestation   I, Brandan George MD, saw this patient and agree with the findings and plan of care as documented in the note.      Items personally reviewed/procedural attestation: vitals.    Brandan George MD

## 2024-04-05 PROBLEM — Z34.90 PREGNANT: Status: RESOLVED | Noted: 2018-11-21 | Resolved: 2024-04-05

## 2024-04-05 LAB
HUMAN PAPILLOMA VIRUS 16 DNA: NEGATIVE
HUMAN PAPILLOMA VIRUS 18 DNA: NEGATIVE
HUMAN PAPILLOMA VIRUS FINAL DIAGNOSIS: NORMAL
HUMAN PAPILLOMA VIRUS OTHER HR: NEGATIVE

## 2024-04-30 ENCOUNTER — OFFICE VISIT (OUTPATIENT)
Dept: OPHTHALMOLOGY | Facility: CLINIC | Age: 44
End: 2024-04-30
Payer: COMMERCIAL

## 2024-04-30 ENCOUNTER — TELEPHONE (OUTPATIENT)
Dept: OPHTHALMOLOGY | Facility: CLINIC | Age: 44
End: 2024-04-30

## 2024-04-30 ENCOUNTER — PRE VISIT (OUTPATIENT)
Dept: OPHTHALMOLOGY | Facility: CLINIC | Age: 44
End: 2024-04-30

## 2024-04-30 DIAGNOSIS — H57.11 BLIND PAINFUL RIGHT EYE: Primary | ICD-10-CM

## 2024-04-30 DIAGNOSIS — H54.40 BLIND PAINFUL RIGHT EYE: Primary | ICD-10-CM

## 2024-04-30 PROCEDURE — 99214 OFFICE O/P EST MOD 30 MIN: CPT | Mod: GC | Performed by: OPHTHALMOLOGY

## 2024-04-30 ASSESSMENT — CONF VISUAL FIELD
OS_INFERIOR_TEMPORAL_RESTRICTION: 0
OD_SUPERIOR_TEMPORAL_RESTRICTION: 1
OD_INFERIOR_NASAL_RESTRICTION: 1
OS_INFERIOR_NASAL_RESTRICTION: 0
OS_NORMAL: 1
OS_SUPERIOR_TEMPORAL_RESTRICTION: 0
OS_SUPERIOR_NASAL_RESTRICTION: 0
OD_SUPERIOR_NASAL_RESTRICTION: 1
OD_INFERIOR_TEMPORAL_RESTRICTION: 1

## 2024-04-30 ASSESSMENT — TONOMETRY
OD_IOP_MMHG: 03
IOP_METHOD: ICARE
OS_IOP_MMHG: 12

## 2024-04-30 ASSESSMENT — VISUAL ACUITY
OS_PH_SC: 20/25
METHOD: SNELLEN - LINEAR
OD_SC: NLP
OS_SC: 20/30

## 2024-04-30 ASSESSMENT — SLIT LAMP EXAM - LIDS
COMMENTS: NORMAL
COMMENTS: NORMAL

## 2024-04-30 NOTE — TELEPHONE ENCOUNTER
Angela- I added him on w/ Randa 5/20 @ 10:15.    Thanks!  Katelin Abraham RN RN 2:03 PM 04/30/24

## 2024-04-30 NOTE — PROGRESS NOTES
Oculoplastic Clinic New Patient    Patient: Ed Rangel MRN# [id number]   YOB: 1980 Age: 44 year old   Date of Visit: Apr 30, 2024    CC: Blind painful eye    Chief Complaint(s) and History of Present Illness(es)     Consult For  blind painful eye          Laterality: right eye       Comments    Utilizing remote   Ed Rangel is being seen for a consult today by the request of Dr. Deshpande   for evaluation of blind, painful right eye.  She states the pain in her right eye is a level 3-4/10 but sometimes goes   up to 10/10.  She takes tyelnol PRN for the pain which doesn't help much.   She doesn't use any ocular meds    SHEREEN Alonzo 9:32 AM 04/30/2024    PMH:  No blood thinners  No pacemaker  No heart/lung issues            HPI:     Ed Rangel is a 44 year old female who has limited visual potential in the RIGHT eye and has experienced significant discomfort associated with it which has been refractory to conservative management. Consideration is being given to removing the eye. The limited visual potential is due to either congenital glaucoma vs microphthalmia of the right eye. She states that vision in that eye was very blurry as a child, but has been blind now for years.             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 Blind painful right eye      blind painful eye    Evisceration with implant right eye.     We discussed the typical postoperative care, including the need for a patch for 4-6 days. After this she will have a clear conformer in. About 8 weeks later I will see her back and she can schedule an appointment with the  to make a prosthesis any time after that.     ANTICOAGULATION:  None             Amado Draper MD  Resident Physician, PGY-2  Department of Ophthalmology     Attending Physician Attestation: Complete documentation of  historical and exam elements from today's encounter can be found in the full encounter summary report (not reduplicated in this progress note). I personally obtained the chief complaint(s) and history of present illness. I confirmed and edited as necessary the review of systems, past medical/surgical history, family history, social history, and examination findings as documented by others; and I examined the patient myself. I personally reviewed the relevant tests, images, and reports as documented above. I formulated and edited as necessary the assessment and plan and discussed the findings and management plan with the patient. Zita Staley MD      Today with Moo Say I reviewed the indications, risks, benefits, and alternatives of the proposed surgical procedure including, but not limited to, failure obtain the desired result and need for additional surgery, bleeding, infection, and the remote possibility of permanent damage to any organ system or death with the use of anesthesia. With implants, there is always a risk of implant related complications including exposure, extrusion, infection, and need for more surgery or medications. I provided multiple opportunities for the questions, answered all questions to the best of my ability, and confirmed that my answers and my discussion were understood.

## 2024-04-30 NOTE — LETTER
2024         RE:  :  MRN: Ed Rangel  1980  [id number]     Dear Dr. Deshpande,    Thank you for asking me to see your patient, Ed Rangel, for an oculoplastic   consultation.  My assessment and plan are below.  For further details, please see my attached clinic note.      Oculoplastic Clinic New Patient     Patient: Ed Rangel MRN# [id number]   YOB: 1980 Age: 44 year old   Date of Visit: 2024     CC: Blind painful eye     Chief Complaint(s) and History of Present Illness(es)     Consult For  blind painful eye          Laterality: right eye       Comments    Utilizing remote   Ed Rangel is being seen for a consult today by the request of Dr. Deshpande   for evaluation of blind, painful right eye.  She states the pain in her right eye is a level 3-4/10 but sometimes goes   up to 10/10.  She takes tyelnol PRN for the pain which doesn't help much.   She doesn't use any ocular meds     SHEREEN Alonzo 9:32 AM 2024     PMH:  No blood thinners  No pacemaker  No heart/lung issues             HPI:      Ed Rangel is a 44 year old female who has limited visual potential in the RIGHT eye and has experienced significant discomfort associated with it which has been refractory to conservative management. Consideration is being given to removing the eye. The limited visual potential is due to either congenital glaucoma vs microphthalmia of the right eye. She states that vision in that eye was very blurry as a child, but has been blind now for years.             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 Blind painful right eye       blind painful eye     Evisceration with implant right eye.      We discussed the typical postoperative care, including the need for a patch for 4-6 days. After this she will have a clear conformer in. About 8 weeks later I  will see her back and she can schedule an appointment with the  to make a prosthesis any time after that.      ANTICOAGULATION:  None       Again, thank you for allowing me to participate in the care of your patient.      Sincerely,    Zita Staley MD  Department of Ophthalmology and Visual Neurosciences  [facility]    CC: Puja Guerrero MD  [address] [place] [address]  Via In Basket     Radha Deshpande MD  [address] [address]  Via In Basket

## 2024-04-30 NOTE — TELEPHONE ENCOUNTER
Called patient to schedule surgery with Dr. Staley    Spoke with: Moo and     Date of Surgery: 5-16-24     Patient aware of approximate arrival time: No at pt to get a call 2-3 day prior to surgery     Location of surgery: [facility]     Pre-Op H&P: [facility]     Informed patient that they need to call to schedule pre-op H&P with PCP within 30 days of surgery date: Yes    Post-Op Appt Dates: TBD- 4-6 day patch removal    Discussed with patient pre-op RN will call 2-3 days prior to surgery with arrival time and instructions:  Yes    Discussed with patient PAC RN will provide arrival time and instructions for surgery at the time of the appointment: [[place] locations only]: Not Applicable      Standard Surgery Packet Sent: Yes 04/30/24  via Mail - Standard      Surgical Soap Discussed with Patient: Yes  - Received:  Local Pharmacy       Additional Information Sent in Packet:          Informed patient that they will need an adult  to bring patient home from surgery: Yes  : Spouse or other family member         Additional Comments:        All patients questions were answered and was instructed to review surgical packet and call back [phone] with any questions or concerns.       Diamond Mars on 4/30/2024 at 1:44 PM

## 2024-05-06 ENCOUNTER — APPOINTMENT (OUTPATIENT)
Dept: INTERPRETER SERVICES | Facility: CLINIC | Age: 44
End: 2024-05-06
Payer: COMMERCIAL

## 2024-05-06 ENCOUNTER — PATIENT OUTREACH (OUTPATIENT)
Dept: CARE COORDINATION | Facility: CLINIC | Age: 44
End: 2024-05-06
Payer: COMMERCIAL

## 2024-05-06 NOTE — PROGRESS NOTES
Food Resource Navigator Contact    FRN - Initial Outreach    Reason for outreach: Food Insecurity  Obesity    Food Insecurity: Low Risk  (12/21/2023)    Food Insecurity     Within the past 12 months, did you worry that your food would run out before you got money to buy more?: No     Within the past 12 months, did the food you bought just not last and you didn t have money to get more?: No     Housing Stability: Low Risk  (12/21/2023)    Housing Stability     Do you have housing? : Yes     Are you worried about losing your housing?: No     Financial Resource Strain: Low Risk  (12/21/2023)    Financial Resource Strain     Within the past 12 months, have you or your family members you live with been unable to get utilities (heat, electricity) when it was really needed?: No     Transportation Needs: Low Risk  (12/21/2023)    Transportation Needs     Within the past 12 months, has lack of transportation kept you from medical appointments, getting your medicines, non-medical meetings or appointments, work, or from getting things that you need?: No       The patient was provided with the following food resources:  Food Resource Packet-Will send with $40 [facility]- Will enroll, sched home delivery in June.  TCMM--pt kindly declined for now, unable to get rides due to  working evenings and day schedules do not work well.   Feeding [place] - Pt is ok with this decision however, reluctant to try due to 's work schedule conflict.      The patient was provided the following community resources:  Financial  [employer] Gave [facility] EZ info line to patient to call her Financial Worker for registration update on 's income but pt is not comfortable to proceed on her own. I will send a mesg to Cultural yonathan Montano to connect with pt to assist in helping call [facility].    I have discussed the following goals with the patient: Pt encourage to answer phone calls, Dusty maybe calling the  patient to help assist. If pt changes her mind, she can reach back to me to enroll her.      Larry Real  [facility] Food Resource Navigator  [employer] is [employer]   Cell: [phone]

## 2024-05-10 ENCOUNTER — OFFICE VISIT (OUTPATIENT)
Dept: FAMILY MEDICINE | Facility: CLINIC | Age: 44
End: 2024-05-10
Payer: COMMERCIAL

## 2024-05-10 VITALS
DIASTOLIC BLOOD PRESSURE: 82 MMHG | SYSTOLIC BLOOD PRESSURE: 113 MMHG | HEART RATE: 56 BPM | TEMPERATURE: 98.2 F | HEIGHT: 64 IN | OXYGEN SATURATION: 98 % | BODY MASS INDEX: 35.2 KG/M2 | RESPIRATION RATE: 16 BRPM | WEIGHT: 206.2 LBS

## 2024-05-10 DIAGNOSIS — Z01.818 PREOP GENERAL PHYSICAL EXAM: Primary | ICD-10-CM

## 2024-05-10 DIAGNOSIS — H57.11 OCULAR PAIN, RIGHT EYE: ICD-10-CM

## 2024-05-10 DIAGNOSIS — H40.001 GLAUCOMA SUSPECT, RIGHT: ICD-10-CM

## 2024-05-10 DIAGNOSIS — L85.3 DRY SKIN DERMATITIS: ICD-10-CM

## 2024-05-10 PROCEDURE — 99213 OFFICE O/P EST LOW 20 MIN: CPT | Performed by: STUDENT IN AN ORGANIZED HEALTH CARE EDUCATION/TRAINING PROGRAM

## 2024-05-10 RX ORDER — DESONIDE 0.5 MG/G
CREAM TOPICAL 2 TIMES DAILY
Qty: 15 G | Refills: 1 | Status: SHIPPED | OUTPATIENT
Start: 2024-05-10 | End: 2024-05-14

## 2024-05-10 ASSESSMENT — PATIENT HEALTH QUESTIONNAIRE - PHQ9
SUM OF ALL RESPONSES TO PHQ QUESTIONS 1-9: 5
SUM OF ALL RESPONSES TO PHQ QUESTIONS 1-9: 5
10. IF YOU CHECKED OFF ANY PROBLEMS, HOW DIFFICULT HAVE THESE PROBLEMS MADE IT FOR YOU TO DO YOUR WORK, TAKE CARE OF THINGS AT HOME, OR GET ALONG WITH OTHER PEOPLE: NOT DIFFICULT AT ALL

## 2024-05-10 NOTE — PROGRESS NOTES
Preoperative Evaluation  [facility] [address] [place] [address]  Phone: [phone]  Fax: [phone]  Primary Provider: Puja Guerrero  Pre-op Performing Provider: BERT NÚÑEZ  May 10, 2024       Ed is a 44 year old, presenting for the following:  Pre-Op Exam (Right eye surgery on 5/16/2024 )        5/10/2024     9:55 AM   Additional Questions   Roomed by ML   Accompanied by self         5/10/2024    Information    services provided? Yes   Language Nia   Type of interpretation provided Face-to-face    name Max Calloway    Agency [facility]     Surgical Information  Surgery/Procedure: Right eye evisceration with implant  Surgery Location: [facility]  Surgeon: Zita Staley MD   Surgery Date: 5/16/24  Time of Surgery: TBD  Where patient plans to recover: At home with family  Fax number for surgical facility: Note does not need to be faxed, will be available electronically in Epic.    Assessment & Plan     The proposed surgical procedure is considered LOW risk.    Preop general physical exam  Ocular pain, right eye  Glaucoma suspect, right      Possible Sleep Apnea:        5/10/2024    10:16 AM   STOP-Bang Total Score   Total Score 3   Risk Stratification 3 - 4: Moderate Risk for MARIA ANTONIA       Recommendation  APPROVAL GIVEN to proceed with proposed procedure, without further diagnostic evaluation.          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         5/10/2024     9:58 AM   Preop Questions   1. Have you ever had a heart attack or stroke? No   2. Have you ever had surgery on your heart or blood vessels, such as a stent placement, a coronary artery bypass, or surgery on an artery in your head, neck, heart, or legs? No   3. Do you have chest pain with activity? No   4. Do you have a history of  heart failure? No   5. Do  you currently have a cold, bronchitis or symptoms of other infection? No   6. Do you have a cough, shortness of breath, or wheezing? No   7. Do you or anyone in your family have previous history of blood clots? No   8. Do you or does anyone in your family have a serious bleeding problem such as prolonged bleeding following surgeries or cuts? No   9. Have you ever had problems with anemia or been told to take iron pills? No   10. Have you had any abnormal blood loss such as black, tarry or bloody stools, or abnormal vaginal bleeding? No   11. Have you ever had a blood transfusion? No   12. Are you willing to have a blood transfusion if it is medically needed before, during, or after your surgery? Yes   13. Have you or any of your relatives ever had problems with anesthesia? No   14. Do you have sleep apnea, excessive snoring or daytime drowsiness? YES - snoring but no diagnosed apnea   14a. Do you have a CPAP machine? No   15. Do you have any artifical heart valves or other implanted medical devices like a pacemaker, defibrillator, or continuous glucose monitor? No   16. Do you have artificial joints? No   17. Are you allergic to latex? No   18. Is there any chance that you may be pregnant? No       Health Care Directive  Patient does not have a Health Care Directive or Living Will: Discussed advance care planning with patient; however, patient declined at this time.    Preoperative Review of    reviewed - no record of controlled substances prescribed.          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       Hepatic steatosis 05/18/2021     Priority: Medium     Noted on RUQ Ultrasound 5/2021      Gallbladder polyp 05/18/2021     " Priority: Medium     Noted on ultrasound 5/2021.  Patient declined referral to surgery.   Recommend recheck RUQ in 6 months       Depression 03/23/2021     Priority: Medium    Pulmonary nodules 01/30/2020     Priority: Medium     Incidental nodule seen on 1/29/20 CXR. To be discussed with patient at next visit--consider CT vs repeat CXR in 6 months.       Mild preeclampsia 06/20/2019     Priority: Medium    Acute pain of right knee 03/01/2019     Priority: Medium     2/26/19 Pt slipped on the ice and fell injuring her rt knee. She was referred to Ortho.  3/26/19 follow-up with Dr Lees.  Pt has chronic patella fracture and he recommends that pt have physical therapy to regain strength in her leg.  Pt does not want surgery and ortho does not feel this would help anyway.      Language barrier, cultural differences 11/26/2018     Priority: Medium    Betel deposit on teeth 11/21/2018     Priority: Medium     11/19/18 Pt reports chewing betel nut 2-3 x's per week.  Discussed importance of cessation during pregnancy.  Pt denied need for assistance with quitting.  Will NEED to reevaluate at future visits.      Migraine      Priority: Medium     [facility] Was taking ibuprofen 600 mg q6h but transitioned to PRN 1-2 times a week. Added nortriptyline 10mg qPM.   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  Please see scanned document.   History of right-sided vision loss since childhood.  Unknown etiology.  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  Patient complains of pain and tearful right eye.  Consultation with ophthalmologist for eye " extraction considered at destination country as recommended.     Dr. Baker of [facility] provided ophthalmology referral on 9/13/2018. He noted sporadic use of timolol drops.   BERNARDSTEVENDO 11/13/18 2:47 PM  Formatting of this note might be different from the original.   Documentation per overseas screening examination.  Please see scanned document.   History of right-sided vision loss since childhood.  Unknown etiology.  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  Patient complains of pain and tearful right eye.  Consultation with ophthalmologist for eye extraction considered at destination country as recommended.      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  Please see documentation in scanned documents.  Per chest x-ray right lower lung granuloma November 9, 2016 and July 6, 2017.  AFB smear and cultures collected November 20 2-24, 2016 negative.        Past Medical History:   Diagnosis Date    Decreased vision of right eye     Since baby    Depression 03/23/2021    Migraine     Chronic, one controller and one as needed medications    Migraines     Miscarriage 11/18/2016     Past Surgical History:   Procedure Laterality Date    NO HISTORY OF SURGERY       Current Outpatient Medications   Medication Sig Dispense Refill    acetaminophen (TYLENOL) 325 MG tablet Take 1-2 tablets (325-650 mg) by mouth every 6 hours as needed for mild pain (Patient not taking: Reported on 5/10/2024) 90 tablet 1    dextran 70-hypromellose (TEARS NATURALE FREE PF) 0.1-0.3 % ophthalmic solution Place 1 drop into both eyes daily as needed (Dry eyes) (Patient not taking: Reported on 2/21/2024) 36 each 0    diclofenac (VOLTAREN) 1 % topical gel Apply 4 g topically 4 times daily as needed for moderate pain For knee pain (Patient not taking: Reported on 2/21/2024) 150 g 0    hydrOXYzine (ATARAX) 25 MG tablet Take 1 tablet (25 mg) by mouth 3 times daily as needed  "for anxiety (Patient not taking: Reported on 5/10/2024) 30 tablet 4    olopatadine (PATADAY) 0.2 % ophthalmic solution Place 0.05 mLs (1 drop) into both eyes daily (Patient not taking: Reported on 2/21/2024) 2.5 mL 3    sertraline (ZOLOFT) 50 MG tablet Take 1 tablet (50 mg) by mouth daily (Patient not taking: Reported on 5/10/2024) 90 tablet 3    SUMAtriptan (IMITREX) 50 MG tablet Take 1 tablet (50 mg) by mouth at onset of headache for migraine May repeat in 2 hours. Max 4 tablets/24 hours. (Patient not taking: Reported on 5/10/2024) 30 tablet 1       No Known Allergies     Social History     Tobacco Use    Smoking status: Never    Smokeless tobacco: Former    Tobacco comments:     Chews betel nut up to 2-3 x's per week   Substance Use Topics    Alcohol use: No       History   Drug Use No         Review of Systems    Positive ROS was noted in the HPI, otherwise negative.     Objective    /82 (BP Location: Right arm, Patient Position: Sitting, Cuff Size: Adult Regular)   Pulse 56   Temp 98.2  F (36.8  C) (Oral)   Resp 16   Ht 1.618 m (5' 3.7\")   Wt 93.5 kg (206 lb 3.2 oz)   LMP 05/10/2024 (Exact Date)   SpO2 98%   BMI 35.73 kg/m     Estimated body mass index is 35.73 kg/m  as calculated from the following:    Height as of this encounter: 1.618 m (5' 3.7\").    Weight as of this encounter: 93.5 kg (206 lb 3.2 oz).  Physical Exam  HENT:      Head: Normocephalic.      Nose: Nose normal.      Mouth/Throat:      Mouth: Mucous membranes are moist.   Eyes:      Extraocular Movements: Extraocular movements intact.      Conjunctiva/sclera: Conjunctivae normal.   Cardiovascular:      Rate and Rhythm: Normal rate and regular rhythm.   Pulmonary:      Effort: Pulmonary effort is normal.      Breath sounds: Normal breath sounds.   Abdominal:      Palpations: Abdomen is soft.   Musculoskeletal:      Cervical back: Normal range of motion.   Skin:     General: Skin is warm.      Capillary Refill: Capillary refill takes " "less than 2 seconds.   Neurological:      General: No focal deficit present.      Mental Status: She is alert.           No results for input(s): \"HGB\", \"PLT\", \"INR\", \"NA\", \"POTASSIUM\", \"CR\", \"A1C\" in the last 13399 hours.     Diagnostics  No labs were ordered during this visit.   No EKG required for low risk surgery (cataract, skin procedure, breast biopsy, etc).    Revised Cardiac Risk Index (RCRI)  The patient has the following serious cardiovascular risks for perioperative complications:   - No serious cardiac risks = 0 points     RCRI Interpretation: 0 points: Class I (very low risk - 0.4% complication rate)         Signed Electronically by: Brandan George MD  Copy of this evaluation report is provided to requesting physician.         .undefined[^^  "

## 2024-05-10 NOTE — PATIENT INSTRUCTIONS
refills for   - sumatriptan - headache medicine  - atarax - itching  - desonide cream - itching  - Zoloft - mood  Try using Eucerin    Preparing for Your Surgery  Getting started  A nurse will call you to review your health history and instructions. They will give you an arrival time based on your scheduled surgery time. Please be ready to share:  Your doctor's clinic name and phone number  Your medical, surgical, and anesthesia history  A list of allergies and sensitivities  A list of medicines, including herbal treatments and over-the-counter drugs  Whether the patient has a legal guardian (ask how to send us the papers in advance)  Please tell us if you're pregnant--or if there's any chance you might be pregnant. Some surgeries may injure a fetus (unborn baby), so they require a pregnancy test. Surgeries that are safe for a fetus don't always need a test, and you can choose whether to have one.   If you have a child who's having surgery, please ask for a copy of Preparing for Your Child's Surgery.    Preparing for surgery  Within 10 to 30 days of surgery: Have a pre-op exam (sometimes called an H&P, or History and Physical). This can be done at a clinic or pre-operative center.  If you're having a , you may not need this exam. Talk to your care team.  At your pre-op exam, talk to your care team about all medicines you take. If you need to stop any medicines before surgery, ask when to start taking them again.  We do this for your safety. Many medicines can make you bleed too much during surgery. Some change how well surgery (anesthesia) drugs work.  Call your insurance company to let them know you're having surgery. (If you don't have insurance, call [phone].)  Call your clinic if there's any change in your health. This includes signs of a cold or flu (sore throat, runny nose, cough, rash, fever). It also includes a scrape or scratch near the surgery site.  If you have questions on the day  of surgery, call your hospital or surgery center.  Eating and drinking guidelines  For your safety: Unless your surgeon tells you otherwise, follow the guidelines below.  Eat and drink as usual until 8 hours before you arrive for surgery. After that, no food or milk.  Drink clear liquids until 2 hours before you arrive. These are liquids you can see through, like water, Gatorade, and Propel Water. They also include plain black coffee and tea (no cream or milk), candy, and breath mints. You can spit out gum when you arrive.  If you drink alcohol: Stop drinking it the night before surgery.  If your care team tells you to take medicine on the morning of surgery, it's okay to take it with a sip of water.  Preventing infection  Shower or bathe the night before and morning of your surgery. Follow the instructions your clinic gave you. (If no instructions, use regular soap.)  Don't shave or clip hair near your surgery site. We'll remove the hair if needed.  Don't smoke or vape the morning of surgery. You may chew nicotine gum up to 2 hours before surgery. A nicotine patch is okay.  Note: Some surgeries require you to completely quit smoking and nicotine. Check with your surgeon.  Your care team will make every effort to keep you safe from infection. We will:  Clean our hands often with soap and water (or an alcohol-based hand rub).  Clean the skin at your surgery site with a special soap that kills germs.  Give you a special gown to keep you warm. (Cold raises the risk of infection.)  Wear special hair covers, masks, gowns and gloves during surgery.  Give antibiotic medicine, if prescribed. Not all surgeries need antibiotics.  What to bring on the day of surgery  Photo ID and insurance card  Copy of your health care directive, if you have one  Glasses and hearing aids (bring cases)  You can't wear contacts during surgery  Inhaler and eye drops, if you use them (tell us about these when you arrive)  CPAP machine or  breathing device, if you use them  A few personal items, if spending the night  If you have . . .  A pacemaker, ICD (cardiac defibrillator) or other implant: Bring the ID card.  An implanted stimulator: Bring the remote control.  A legal guardian: Bring a copy of the certified (court-stamped) guardianship papers.  Please remove any jewelry, including body piercings. Leave jewelry and other valuables at home.  If you're going home the day of surgery  You must have a responsible adult drive you home. They should stay with you overnight as well.  If you don't have someone to stay with you, and you aren't safe to go home alone, we may keep you overnight. Insurance often won't pay for this.  After surgery  If it's hard to control your pain or you need more pain medicine, please call your surgeon's office.  Questions?   If you have any questions for your care team, list them here: _________________________________________________________________________________________________________________________________________________________________________ ____________________________________ ____________________________________ ____________________________________  For informational purposes only. Not to replace the advice of your health care provider. Copyright   2003, 2019 [facility]. All rights reserved. Clinically reviewed by Dixie Meade MD. Rkylin [id number] - REV 12/22.

## 2024-05-15 ENCOUNTER — ANESTHESIA EVENT (OUTPATIENT)
Dept: SURGERY | Facility: AMBULATORY SURGERY CENTER | Age: 44
End: 2024-05-15
Payer: COMMERCIAL

## 2024-05-16 ENCOUNTER — HOSPITAL ENCOUNTER (OUTPATIENT)
Facility: AMBULATORY SURGERY CENTER | Age: 44
Discharge: HOME | End: 2024-05-16
Attending: OPHTHALMOLOGY
Payer: COMMERCIAL

## 2024-05-16 ENCOUNTER — ANESTHESIA (OUTPATIENT)
Dept: SURGERY | Facility: AMBULATORY SURGERY CENTER | Age: 44
End: 2024-05-16
Payer: COMMERCIAL

## 2024-05-16 VITALS
SYSTOLIC BLOOD PRESSURE: 116 MMHG | RESPIRATION RATE: 16 BRPM | TEMPERATURE: 97 F | DIASTOLIC BLOOD PRESSURE: 65 MMHG | HEART RATE: 57 BPM | WEIGHT: 206 LBS | OXYGEN SATURATION: 97 % | BODY MASS INDEX: 35.17 KG/M2 | HEIGHT: 64 IN

## 2024-05-16 DIAGNOSIS — Z98.890 POSTOPERATIVE EYE STATE: Primary | ICD-10-CM

## 2024-05-16 PROCEDURE — 65093 REVISE EYE WITH IMPLANT: CPT | Performed by: ANESTHESIOLOGY

## 2024-05-16 PROCEDURE — 88305 TISSUE EXAM BY PATHOLOGIST: CPT | Mod: 26 | Performed by: OPHTHALMOLOGY

## 2024-05-16 PROCEDURE — 67875 CLOSURE OF EYELID BY SUTURE: CPT | Mod: RT

## 2024-05-16 PROCEDURE — 67875 CLOSURE OF EYELID BY SUTURE: CPT | Mod: RT | Performed by: OPHTHALMOLOGY

## 2024-05-16 PROCEDURE — 65093 REVISE EYE WITH IMPLANT: CPT | Performed by: NURSE ANESTHETIST, CERTIFIED REGISTERED

## 2024-05-16 PROCEDURE — 88313 SPECIAL STAINS GROUP 2: CPT | Mod: 26 | Performed by: OPHTHALMOLOGY

## 2024-05-16 PROCEDURE — 88313 SPECIAL STAINS GROUP 2: CPT | Mod: TC | Performed by: OPHTHALMOLOGY

## 2024-05-16 PROCEDURE — 65093 REVISE EYE WITH IMPLANT: CPT | Mod: RT

## 2024-05-16 PROCEDURE — 65093 REVISE EYE WITH IMPLANT: CPT | Mod: RT | Performed by: OPHTHALMOLOGY

## 2024-05-16 DEVICE — EYE IMP SPHERE SILICONE 18MM S6.1018U: Type: IMPLANTABLE DEVICE | Site: EYE | Status: FUNCTIONAL

## 2024-05-16 RX ORDER — HYDRALAZINE HYDROCHLORIDE 20 MG/ML
2.5-5 INJECTION INTRAMUSCULAR; INTRAVENOUS EVERY 10 MIN PRN
Status: DISCONTINUED | OUTPATIENT
Start: 2024-05-16 | End: 2024-05-16 | Stop reason: HOSPADM

## 2024-05-16 RX ORDER — NALOXONE HYDROCHLORIDE 0.4 MG/ML
0.1 INJECTION, SOLUTION INTRAMUSCULAR; INTRAVENOUS; SUBCUTANEOUS
Status: DISCONTINUED | OUTPATIENT
Start: 2024-05-16 | End: 2024-05-17 | Stop reason: HOSPADM

## 2024-05-16 RX ORDER — OXYCODONE HYDROCHLORIDE 5 MG/1
10 TABLET ORAL
Status: DISCONTINUED | OUTPATIENT
Start: 2024-05-16 | End: 2024-05-17 | Stop reason: HOSPADM

## 2024-05-16 RX ORDER — FENTANYL CITRATE 50 UG/ML
25 INJECTION, SOLUTION INTRAMUSCULAR; INTRAVENOUS EVERY 5 MIN PRN
Status: DISCONTINUED | OUTPATIENT
Start: 2024-05-16 | End: 2024-05-16 | Stop reason: HOSPADM

## 2024-05-16 RX ORDER — SODIUM CHLORIDE, SODIUM LACTATE, POTASSIUM CHLORIDE, CALCIUM CHLORIDE 600; 310; 30; 20 MG/100ML; MG/100ML; MG/100ML; MG/100ML
INJECTION, SOLUTION INTRAVENOUS CONTINUOUS
Status: DISCONTINUED | OUTPATIENT
Start: 2024-05-16 | End: 2024-05-16 | Stop reason: HOSPADM

## 2024-05-16 RX ORDER — DEXAMETHASONE SODIUM PHOSPHATE 10 MG/ML
4 INJECTION, SOLUTION INTRAMUSCULAR; INTRAVENOUS
Status: DISCONTINUED | OUTPATIENT
Start: 2024-05-16 | End: 2024-05-16 | Stop reason: HOSPADM

## 2024-05-16 RX ORDER — ONDANSETRON 2 MG/ML
4 INJECTION INTRAMUSCULAR; INTRAVENOUS EVERY 30 MIN PRN
Status: DISCONTINUED | OUTPATIENT
Start: 2024-05-16 | End: 2024-05-17 | Stop reason: HOSPADM

## 2024-05-16 RX ORDER — FENTANYL CITRATE 50 UG/ML
INJECTION, SOLUTION INTRAMUSCULAR; INTRAVENOUS PRN
Status: DISCONTINUED | OUTPATIENT
Start: 2024-05-16 | End: 2024-05-16

## 2024-05-16 RX ORDER — OXYCODONE HYDROCHLORIDE 5 MG/1
5 TABLET ORAL
Status: COMPLETED | OUTPATIENT
Start: 2024-05-16 | End: 2024-05-16

## 2024-05-16 RX ORDER — PROPOFOL 10 MG/ML
INJECTION, EMULSION INTRAVENOUS CONTINUOUS PRN
Status: DISCONTINUED | OUTPATIENT
Start: 2024-05-16 | End: 2024-05-16

## 2024-05-16 RX ORDER — CEFAZOLIN SODIUM 2 G/50ML
2 SOLUTION INTRAVENOUS SEE ADMIN INSTRUCTIONS
Status: DISCONTINUED | OUTPATIENT
Start: 2024-05-16 | End: 2024-05-16 | Stop reason: HOSPADM

## 2024-05-16 RX ORDER — ACETAMINOPHEN 325 MG/1
975 TABLET ORAL ONCE
Status: COMPLETED | OUTPATIENT
Start: 2024-05-16 | End: 2024-05-16

## 2024-05-16 RX ORDER — CEFAZOLIN SODIUM 2 G/50ML
2 SOLUTION INTRAVENOUS
Status: COMPLETED | OUTPATIENT
Start: 2024-05-16 | End: 2024-05-16

## 2024-05-16 RX ORDER — LIDOCAINE 40 MG/G
CREAM TOPICAL
Status: DISCONTINUED | OUTPATIENT
Start: 2024-05-16 | End: 2024-05-16 | Stop reason: HOSPADM

## 2024-05-16 RX ORDER — PROPOFOL 10 MG/ML
INJECTION, EMULSION INTRAVENOUS PRN
Status: DISCONTINUED | OUTPATIENT
Start: 2024-05-16 | End: 2024-05-16

## 2024-05-16 RX ORDER — ERYTHROMYCIN 5 MG/G
OINTMENT OPHTHALMIC
Qty: 3.5 G | Refills: 0 | Status: SHIPPED | OUTPATIENT
Start: 2024-05-16

## 2024-05-16 RX ORDER — ONDANSETRON 4 MG/1
4 TABLET, ORALLY DISINTEGRATING ORAL EVERY 30 MIN PRN
Status: DISCONTINUED | OUTPATIENT
Start: 2024-05-16 | End: 2024-05-17 | Stop reason: HOSPADM

## 2024-05-16 RX ORDER — NALOXONE HYDROCHLORIDE 0.4 MG/ML
0.1 INJECTION, SOLUTION INTRAMUSCULAR; INTRAVENOUS; SUBCUTANEOUS
Status: DISCONTINUED | OUTPATIENT
Start: 2024-05-16 | End: 2024-05-16 | Stop reason: HOSPADM

## 2024-05-16 RX ORDER — FENTANYL CITRATE 50 UG/ML
50 INJECTION, SOLUTION INTRAMUSCULAR; INTRAVENOUS EVERY 5 MIN PRN
Status: DISCONTINUED | OUTPATIENT
Start: 2024-05-16 | End: 2024-05-16 | Stop reason: HOSPADM

## 2024-05-16 RX ORDER — ONDANSETRON 2 MG/ML
4 INJECTION INTRAMUSCULAR; INTRAVENOUS EVERY 30 MIN PRN
Status: DISCONTINUED | OUTPATIENT
Start: 2024-05-16 | End: 2024-05-16 | Stop reason: HOSPADM

## 2024-05-16 RX ORDER — DEXAMETHASONE SODIUM PHOSPHATE 10 MG/ML
4 INJECTION, SOLUTION INTRAMUSCULAR; INTRAVENOUS
Status: DISCONTINUED | OUTPATIENT
Start: 2024-05-16 | End: 2024-05-17 | Stop reason: HOSPADM

## 2024-05-16 RX ORDER — ERYTHROMYCIN 5 MG/G
OINTMENT OPHTHALMIC PRN
Status: DISCONTINUED | OUTPATIENT
Start: 2024-05-16 | End: 2024-05-16 | Stop reason: HOSPADM

## 2024-05-16 RX ORDER — ONDANSETRON 2 MG/ML
INJECTION INTRAMUSCULAR; INTRAVENOUS PRN
Status: DISCONTINUED | OUTPATIENT
Start: 2024-05-16 | End: 2024-05-16

## 2024-05-16 RX ORDER — ONDANSETRON 4 MG/1
4 TABLET, ORALLY DISINTEGRATING ORAL EVERY 30 MIN PRN
Status: DISCONTINUED | OUTPATIENT
Start: 2024-05-16 | End: 2024-05-16 | Stop reason: HOSPADM

## 2024-05-16 RX ORDER — DEXAMETHASONE SODIUM PHOSPHATE 4 MG/ML
INJECTION, SOLUTION INTRA-ARTICULAR; INTRALESIONAL; INTRAMUSCULAR; INTRAVENOUS; SOFT TISSUE PRN
Status: DISCONTINUED | OUTPATIENT
Start: 2024-05-16 | End: 2024-05-16

## 2024-05-16 RX ORDER — OXYCODONE HYDROCHLORIDE 5 MG/1
5 TABLET ORAL EVERY 6 HOURS PRN
Qty: 12 TABLET | Refills: 0 | Status: SHIPPED | OUTPATIENT
Start: 2024-05-16 | End: 2024-05-19

## 2024-05-16 RX ORDER — LIDOCAINE HYDROCHLORIDE 20 MG/ML
INJECTION, SOLUTION INFILTRATION; PERINEURAL PRN
Status: DISCONTINUED | OUTPATIENT
Start: 2024-05-16 | End: 2024-05-16

## 2024-05-16 RX ORDER — HYDROMORPHONE HYDROCHLORIDE 1 MG/ML
0.4 INJECTION, SOLUTION INTRAMUSCULAR; INTRAVENOUS; SUBCUTANEOUS EVERY 5 MIN PRN
Status: DISCONTINUED | OUTPATIENT
Start: 2024-05-16 | End: 2024-05-16 | Stop reason: HOSPADM

## 2024-05-16 RX ORDER — LABETALOL HYDROCHLORIDE 5 MG/ML
10 INJECTION, SOLUTION INTRAVENOUS
Status: DISCONTINUED | OUTPATIENT
Start: 2024-05-16 | End: 2024-05-16 | Stop reason: HOSPADM

## 2024-05-16 RX ORDER — HYDROMORPHONE HYDROCHLORIDE 1 MG/ML
0.2 INJECTION, SOLUTION INTRAMUSCULAR; INTRAVENOUS; SUBCUTANEOUS EVERY 5 MIN PRN
Status: DISCONTINUED | OUTPATIENT
Start: 2024-05-16 | End: 2024-05-16 | Stop reason: HOSPADM

## 2024-05-16 RX ADMIN — CEFAZOLIN SODIUM 2 G: 2 SOLUTION INTRAVENOUS at 10:05

## 2024-05-16 RX ADMIN — PROPOFOL 150 MCG/KG/MIN: 10 INJECTION, EMULSION INTRAVENOUS at 10:09

## 2024-05-16 RX ADMIN — FENTANYL CITRATE 50 MCG: 50 INJECTION, SOLUTION INTRAMUSCULAR; INTRAVENOUS at 10:14

## 2024-05-16 RX ADMIN — LIDOCAINE HYDROCHLORIDE 100 MG: 20 INJECTION, SOLUTION INFILTRATION; PERINEURAL at 10:09

## 2024-05-16 RX ADMIN — ONDANSETRON 4 MG: 2 INJECTION INTRAMUSCULAR; INTRAVENOUS at 10:13

## 2024-05-16 RX ADMIN — PROPOFOL 200 MG: 10 INJECTION, EMULSION INTRAVENOUS at 10:09

## 2024-05-16 RX ADMIN — ACETAMINOPHEN 975 MG: 325 TABLET ORAL at 08:05

## 2024-05-16 RX ADMIN — SODIUM CHLORIDE, SODIUM LACTATE, POTASSIUM CHLORIDE, CALCIUM CHLORIDE: 600; 310; 30; 20 INJECTION, SOLUTION INTRAVENOUS at 10:05

## 2024-05-16 RX ADMIN — FENTANYL CITRATE 50 MCG: 50 INJECTION, SOLUTION INTRAMUSCULAR; INTRAVENOUS at 10:22

## 2024-05-16 RX ADMIN — DEXAMETHASONE SODIUM PHOSPHATE 4 MG: 4 INJECTION, SOLUTION INTRA-ARTICULAR; INTRALESIONAL; INTRAMUSCULAR; INTRAVENOUS; SOFT TISSUE at 10:13

## 2024-05-16 RX ADMIN — OXYCODONE HYDROCHLORIDE 5 MG: 5 TABLET ORAL at 12:08

## 2024-05-16 NOTE — BRIEF OP NOTE
[facility]    Brief Operative Note    Pre-operative diagnosis: Blind painful right eye [H54.40, H57.11]  Post-operative diagnosis Same as pre-operative diagnosis    Procedure: Right eye evisceration with implant, Right - Eye    Surgeon: Surgeons and Role:     * Zita Staley MD - Primary     * Amado Draper MD - Resident - Assisting     * Shana Tolentino MD - Fellow - Assisting  Anesthesia: General   Estimated Blood Loss: Minimal    Drains: None  Specimens:   ID Type Source Tests Collected by Time Destination   1 : Right eye contents Tissue Eye, Right SURGICAL PATHOLOGY EXAM Zita Staley MD 5/16/2024 10:26 AM      Findings:   None.  Complications: None.  Implants:   Implant Name Type Inv. Item Serial No.  Lot No. LRB No. Used Action   EYE IMP SPHERE SILICONE 18MM S6.1018U - [id number] Lens/Eye Implant EYE IMP SPHERE SILICONE 18MM S6.1018U [id number] [employer]  Right 1 Implanted   EYE IMP SPHERE BIOCERAMIC 18MM S6.5118U - [id number] Lens/Eye Implant EYE IMP SPHERE BIOCERAMIC 18MM S6.5118U [id number] [employer]  Right 1 Wasted

## 2024-05-16 NOTE — ANESTHESIA PROCEDURE NOTES
Airway       Patient location during procedure: OR  Staff -        CRNA: Hilda Hernandez APRN CRNA       Performed By: anesthesiologist  Consent for Airway        Urgency: elective  Indications and Patient Condition       Indications for airway management: erum-procedural and airway protection       Induction type:intravenous       Mask difficulty assessment: 0 - not attempted    Final Airway Details       Final airway type: supraglottic airway    Supraglottic Airway Details        Type: LMA       Brand: LMA Unique       LMA size: 4    Post intubation assessment        Placement verified by: capnometry and equal breath sounds        Number of attempts at approach: 1       Secured with: tape       Ease of procedure: easy       Dentition: Intact

## 2024-05-16 NOTE — ANESTHESIA PREPROCEDURE EVALUATION
Anesthesia Pre-Procedure Evaluation    Patient: Ed Rangel   MRN: [id number] : 1980        Procedure : Procedure(s):  Right eye evisceration with implant          Past Medical History:   Diagnosis Date    Decreased vision of right eye     Since baby    Depression 2021    Migraine     Chronic, one controller and one as needed medications    Migraines     Miscarriage 2016      Past Surgical History:   Procedure Laterality Date    NO HISTORY OF SURGERY        No Known Allergies   Social History     Tobacco Use    Smoking status: Never    Smokeless tobacco: Former    Tobacco comments:     Chews betel nut up to 2-3 x's per week   Substance Use Topics    Alcohol use: No      Wt Readings from Last 1 Encounters:   24 93.4 kg (206 lb)        Anesthesia Evaluation            ROS/MED HX  ENT/Pulmonary:       Neurologic:       Cardiovascular:     (+)  hypertension- -   -  - -                                      METS/Exercise Tolerance:     Hematologic:       Musculoskeletal:       GI/Hepatic:       Renal/Genitourinary:       Endo:     (+)               Obesity,       Psychiatric/Substance Use:     (+) psychiatric history depression       Infectious Disease:       Malignancy:       Other:            Physical Exam    Airway  airway exam normal      Mallampati: II   TM distance: > 3 FB   Neck ROM: full   Mouth opening: > 3 cm    Respiratory Devices and Support         Dental       (+) Completely normal teeth      Cardiovascular          Rhythm and rate: regular and normal     Pulmonary   pulmonary exam normal        breath sounds clear to auscultation           OUTSIDE LABS:  CBC:   Lab Results   Component Value Date    WBC 22.2 (H) 2019    HGB 12.7 2021    HGB 12.5 2019    HCT 38.7 2019    HCT 33.0 (L) 2019     2019     2019     BMP:   Lab Results   Component Value Date     2021    .6 2019    POTASSIUM 3.6 2021     "POTASSIUM 3.9 07/03/2019    CHLORIDE 104 07/30/2021    CHLORIDE 106.4 07/03/2019    CO2 26 07/30/2021    CO2 26.5 07/03/2019    BUN 8 07/30/2021    BUN 4.8 (L) 07/03/2019    CR 0.76 07/30/2021    CR 0.6 07/03/2019     07/30/2021    GLC 88.5 07/03/2019     COAGS:   Lab Results   Component Value Date    PTT 33 06/20/2019    INR 1.05 06/20/2019     POC:   Lab Results   Component Value Date    HCG Negative 07/30/2021     HEPATIC:   Lab Results   Component Value Date    ALBUMIN 3.4 (L) 07/30/2021    PROTTOTAL 7.7 07/30/2021    ALT 37 07/30/2021    AST 21 07/30/2021    ALKPHOS 55 07/30/2021    BILITOTAL 0.4 07/30/2021    BILIDIRECT 0.1 11/21/2018     OTHER:   Lab Results   Component Value Date    A1C 5.1 07/30/2021    EMMA 9.0 07/30/2021       Anesthesia Plan    ASA Status:  2       Anesthesia Type: General.     - Airway: LMA   Induction: Intravenous.   Maintenance: Balanced.        Consents    Anesthesia Plan(s) and associated risks, benefits, and realistic alternatives discussed. Questions answered and patient/representative(s) expressed understanding.     - Discussed:     - Discussed with:  Patient      - Extended Intubation/Ventilatory Support Discussed: No.      - Patient is DNR/DNI Status: No     Use of blood products discussed: No .     Postoperative Care    Pain management: IV analgesics, Oral pain medications, Multi-modal analgesia.   PONV prophylaxis: Ondansetron (or other 5HT-3), Background Propofol Infusion     Comments:               Mario Man MD    I have reviewed the pertinent notes and labs in the chart from the past 30 days and (re)examined the patient.  Any updates or changes from those notes are reflected in this note.              # Obesity: Estimated body mass index is 35.69 kg/m  as calculated from the following:    Height as of this encounter: 1.618 m (5' 3.7\").    Weight as of this encounter: 93.4 kg (206 lb).      "

## 2024-05-16 NOTE — ANESTHESIA CARE TRANSFER NOTE
Patient: Ed Rangel    Procedure: Procedure(s):  Right eye evisceration with implant       Diagnosis: Blind painful right eye [H54.40, H57.11]  Diagnosis Additional Information: No value filed.    Anesthesia Type:   General     Note:    Oropharynx: oropharynx clear of all foreign objects  Level of Consciousness: awake and drowsy  Oxygen Supplementation: face mask  Level of Supplemental Oxygen (L/min / FiO2): 6  Independent Airway: airway patency satisfactory and stable  Dentition: dentition unchanged  Vital Signs Stable: post-procedure vital signs reviewed and stable  Report to RN Given: handoff report given  Patient transferred to: PACU    Handoff Report: Identifed the Patient, Identified the Reponsible Provider, Reviewed the pertinent medical history, Discussed the surgical course, Reviewed Intra-OP anesthesia mangement and issues during anesthesia, Set expectations for post-procedure period and Allowed opportunity for questions and acknowledgement of understanding  Vitals:  Vitals Value Taken Time   BP     Temp     Pulse     Resp 32 05/16/24 1107   SpO2 98 % 05/16/24 1107   Vitals shown include unfiled device data.    Electronically Signed By: MARK Art CRNA  May 16, 2024  11:08 AM

## 2024-05-16 NOTE — OP NOTE
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 Evisceration of right  with placement of an 18  mm silicone implant.   2. Temporary tarsorrhaphy, right.   SURGEON: Zita Staley MD.   ASSISTANTS: Shana Tolentino MD and Amado Draper MD  ANESTHESIA: General via endotracheal tube and a retrobulbar block consisting of a 50:50 mixture of 2% lidocaine with epinephrine and 0.5% Marcaine.   COMPLICATIONS: None.   ESTIMATED BLOOD LOSS: Less than 10 mL.   SPECIMENS: Intraocular contents and cornea from left eye sent to pathology.   IMPLANTS: 18 mm silicone sphere.   HISTORY OF PRESENT ILLNESS: Ed Rangel  presented with a  history of pain and blindness in the right eye. Her history is unclear, but she reports she could not see well out of that eye as a child and it gradually became painful. 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 After the risks, benefits and alternatives to the proposed procedure were explained to the patient, informed consent was obtained.   DESCRIPTION OF PROCEDURE: Ed Rangel  was brought to the operating room and placed supine on the operating table. Under general anesthesia, with an LMA, the right was identified as the correct eye for surgery. Retrobulbar block was administered. The area was  prepped and draped in the typical sterile ophthalmic fashion. A 360-degree conjunctival peritomy was performed. The limbal incision was made with the keratome and the cornea was excised with Juana scissors, taking a rim of the sclera. Using the evisceration spoon, the intraocular contents were removed. The intraocular content was all calcified and hard. Hemostasis was obtained with bipolar cautery. Relaxing incisions were made inferonasally and superolaterally. Posterior  relaxing incisions were made in the sclera using the keratome to create a larger scleral volume to allow the 18 mm  implant to be inserted and the sclera easily closed without tension over it. The intraocular sizing spheres were used to determine the correct implant size. An 18 mm implant was chosen. The sclera was then closed with interrupted 5-0 Monocryl sutures passed in mattress fashion. Tenons was closed with interrupted 5-0 Monocryl sutures. Conjunctiva was closed with a 6-0 plain gut suture. Erythromycin ophthalmic ointment and a conformer were placed. Temporary tarsorrhaphy of 5-0 Monocryl was placed laterally in a double-armed horizontal mattress fashion. A pressure patch was placed. The patient tolerated the procedure well, was awoken from general anesthesia and taken to recovery room in stable condition.   ERASTO DAVIS MD

## 2024-05-16 NOTE — DISCHARGE INSTRUCTIONS
Post-operative Instructions - Enucleation and Evisceration   Ophthalmic Plastic and Reconstructive Surgery    Zita Staley M.D.    All instructions apply to the operated eye(s) or eyelid(s).    Wound care and personal care  ? Leave the dressing in place until seen in clinic. Ensure that the bandage does not get wet when you take a shower.  ? Warm soaks or warm packs should be used over the operated side four times daily after the dressing has been removed.    ? You may shower the day after surgery but do not get the dressing wet. Do not bathe for 1 week to prevent contamination of your wounds. Do not submerge your head in water (swimming, in a tub) for six weeks.  ? Do not apply make-up to the eyes or eyelids for 2 weeks after surgery.  ? Expect some swelling, bruising, black eye (even into the lower eyelids and cheeks). Also expect serum caking, crusting and discharge from the eye and/or incisions. A small amount of surface bleeding is normal for the first 48 hours.  ? Your eye(s) and eyelid(s) may be painful and tender. This is normal after surgery. You will have pain with eye movements.   Use the pain medication as prescribed.    Follow-up  ? Return to the Eye Clinic for a follow-up appointment with your physician as  scheduled. If no appointment has been scheduled, you should be seen within 4-7 days to have the dressing removed.    Activity restrictions and driving  ? Avoid heavy lifting (over 15 pounds), bending, exercise or strenuous activity for 1 week after surgery.  You may resume other activities and return to work as tolerated.  ? You may not resume driving until have you stopped using narcotic pain medications (such as Norco, Oxycodone, Percocet, Tylenol #3).    Medications  ? Restart all your regular home medications. If you were using eye drops in the operated eye, you can stop those. If you take Plavix or Aspirin on a regular basis, wait for 72 hours after your surgery before restarting these in  order to decrease the risk of bleeding complications.  ? Avoid aspirin and aspirin-like medications (Motrin, Aleve, Ibuprofen, Alyce-Emmett etc) for 72 hours to reduce the risk of bleeding. You may take Tylenol  (acetaminophen) for pain.  ? In addition to your home medications, take the following post-operative medications as prescribed by your physician.    ? Take pain pills at prescribed frequency as needed for pain.  ? Take antibiotic pills as prescribed (2x per day for 7 days)  ? Once your dressing has been removed, you should apply the prescribed antibiotic ointment to the operated eye socket three times a day.    ? WARNING: Some prescription pain medications contain Tylenol (acetaminophen). You must not take more than 4,000 mg of acetaminophen per  24-hour period. This is equivalent to 12 tablets of Norco, Percocet or Tylenol #3. If you take other over-the-counter medications containing acetaminophen, you must take the amount of acetaminophen into account and reduce the number of prescribed pain pills accordingly.  ? The prescribed medications may make you drowsy. You must not drive a car, operate heavy machinery or drink alcohol while taking them.  ? The prescribed pain medications may cause constipation and nausea. Take them with some food to prevent a stomach upset. If you continue to experience nausea, call your physician.    Contact Information:  If you have any problems, concerns, or need to schedule follow up please call the clinic:    If your clinic appointments (regardless of where surgery was performed) are at the [facility]: [phone]     An on call person can be reached after hours for concerns. The on call doctor should not call in medication refill requests after hours or on weekends, so please plan accordingly. An effort has been made to provide adequate pain medications following every surgery, and refills will not be provided in most instances.      [facility]  Home Care Following Anesthesia  For 24 hours after surgery:  Get plenty of rest.  A responsible adult must stay with you for at least 24 hours after you leave the surgery center.  Do not drive or use heavy equipment.  If you have weakness or tingling, don't drive or use heavy equipment until this feeling goes away.   Do not drink alcohol.   Avoid strenuous or risky activities.  Ask for help when climbing stairs.  You may feel lightheaded.  IF so, sit for a few minutes before standing.  Have someone help you get up.   If you have nausea (feel sick to your stomach): Drink only clear liquids such as apple juice, ginger ale, broth or 7-Up.  Rest may also help.  Be sure to drink enough fluids.  Move to a regular diet as you feel able.   You may have a slight fever.  Call the doctor if your fever is over 100 F (37.7 C) (taken under the tongue) or lasts longer than 24 hours.  You may have a dry mouth, a sore throat, muscle aches or trouble sleeping. These should go away after 24 hours.  Do not make important or legal decisions.   It is recommended to avoid smoking.               Tips for taking pain medications  To get the best pain relief possible, remember these points:  Take pain medications as directed, before pain becomes severe.  Pain medication can upset your stomach: taking it with food may help.  Constipation is a common side effect of pain medication. Drink plenty of  fluids.  Eat foods high in fiber. Take a stool softener if recommended by your doctor or pharmacist.  Do not drink alcohol, drive or operate machinery while taking pain medications.  Ask about other ways to control pain, such as with heat, ice or relaxation.    Tylenol/Acetaminophen Consumption    If you feel your pain relief is insufficient, you may take Tylenol/Acetaminophen in addition to your narcotic pain medication.   Be careful not to exceed 4,000 mg of Tylenol/Acetaminophen in a 24 hour period from all  sources.  If you are taking extra strength Tylenol/acetaminophen (500 mg), the maximum dose is 8 tablets in 24 hours.  If you are taking regular strength acetaminophen (325 mg), the maximum dose is 12 tablets in 24 hours.    Call a doctor for any of the following:  Signs of infection (fever, growing tenderness at the surgery site, a large amount of drainage or bleeding, severe pain, foul-smelling drainage, redness, swelling).  It has been over 8 to 10 hours since surgery and you are still not able to urinate (pass water).  Headache for over 24 hours.  Numbness, tingling or weakness the day after surgery (if you had spinal anesthesia).  Signs of Covid-19 infection (temperature over 100 degrees, shortness of breath, cough, loss of taste/smell, generalized body aches, persistent headache, chills, sore throat, nausea/vomiting/diarrhea)  Your doctor is:       Dr. Zita Staley, Ophthalmology: [phone]               Or dial [phone] and ask for the resident on call for:  Ophthalmology  For emergency care, call the:  [facility]:  [phone] (TTY for hearing impaired: [phone])

## 2024-05-16 NOTE — ANESTHESIA POSTPROCEDURE EVALUATION
Patient: Ed Rangel    Procedure: Procedure(s):  Right eye evisceration with implant       Anesthesia Type:  General    Note:  Disposition: Outpatient   Postop Pain Control: Uneventful            Sign Out: Well controlled pain   PONV: No   Neuro/Psych: Uneventful            Sign Out: Acceptable/Baseline neuro status   Airway/Respiratory: Uneventful            Sign Out: Acceptable/Baseline resp. status   CV/Hemodynamics: Uneventful            Sign Out: Acceptable CV status   Other NRE: NONE   DID A NON-ROUTINE EVENT OCCUR? No           Last vitals:  Vitals Value Taken Time   /68 05/16/24 1150   Temp 36  C (96.8  F) 05/16/24 1150   Pulse 54 05/16/24 1150   Resp 16 05/16/24 1150   SpO2 93 % 05/16/24 1150       Electronically Signed By: Mario Man MD  May 16, 2024  1:19 PM

## 2024-05-17 ENCOUNTER — TELEPHONE (OUTPATIENT)
Dept: OPHTHALMOLOGY | Facility: CLINIC | Age: 44
End: 2024-05-17
Payer: COMMERCIAL

## 2024-05-17 NOTE — TELEPHONE ENCOUNTER
Left voice message for Patient of scheduled appointment:  Date: 5/20/24  Time: 10:15am  Visit type: POST-OP  Provider:   [place] [facility]   Testing/imaging: Patch Removal  Additional notes: POST-OP for patch removal (DOS 5/16/24 for  patient)-LVM for patient  Via  call      Provided appointment details of current scheduled POST-OP appointment.-Via  Call

## 2024-05-20 ENCOUNTER — OFFICE VISIT (OUTPATIENT)
Dept: OPHTHALMOLOGY | Facility: CLINIC | Age: 44
End: 2024-05-20
Payer: COMMERCIAL

## 2024-05-20 DIAGNOSIS — Z98.890 POSTOPERATIVE EYE STATE: Primary | ICD-10-CM

## 2024-05-20 PROCEDURE — 99024 POSTOP FOLLOW-UP VISIT: CPT | Performed by: STUDENT IN AN ORGANIZED HEALTH CARE EDUCATION/TRAINING PROGRAM

## 2024-05-20 RX ORDER — NEOMYCIN SULFATE, POLYMYXIN B SULFATE, AND DEXAMETHASONE 3.5; 10000; 1 MG/G; [USP'U]/G; MG/G
0.5 OINTMENT OPHTHALMIC 4 TIMES DAILY
Qty: 3.5 G | Refills: 1 | Status: SHIPPED | OUTPATIENT
Start: 2024-05-20 | End: 2024-05-31

## 2024-05-20 ASSESSMENT — TONOMETRY
OD_IOP_MMHG: ENUCLEATION
IOP_METHOD: ICARE
OS_IOP_MMHG: 14

## 2024-05-20 ASSESSMENT — VISUAL ACUITY
METHOD: SNELLEN - LINEAR
OD_SC: EVISCERATION
OS_SC: 20/30-1

## 2024-05-20 ASSESSMENT — SLIT LAMP EXAM - LIDS: COMMENTS: NORMAL

## 2024-05-20 NOTE — PROGRESS NOTES
"Chief Complaint(s) and History of Present Illness(es)     Follow Up            Laterality: right eye    Associated symptoms: eye pain    Pain scale: 5/10    Comments: S/P Right eye evisceration with implant 5/16/24  Right eye hurts with a little with stinging. Patch has remained in place   since surgery  Back is hurting more however. This began since surgery. Trouble sleeping   due to back pain.     Left eye or non surgical eye feels very dry since surgery and unable to   open left eye much since the evisceration of right eye.        Patient present today with daughter August who preferred to interpret   today verses use the Language line.     Comments    Neema Sheriff, COT COT 10:16 AM May 20, 2024     Moo Say is 1 week s/p right eye evisceration with implant (5/16/24)  Patch removed, temporary tarso in place, conformer in place  No implant exposure.  Surgical pathology pending.    Patient Instructions   - Apply warm compresses for 1 minute two to three times a day until your bruising has resolved. You can continue this for one more month.   - Cool compresses can help with swelling and itching, you can alternate cool compresses with warm compresses if you find it helpful.  - The antibiotic ointment should be applied into the operative eye four times a day.    - The oral antibiotic should be taken as instructed  - If you have symptoms of eye irritation, it is good to use over the counter artificial tears. Good brands include Refresh, Blink, and Systane. Do NOT get drops that are for \"red eyes.\"   - Please schedule a visit with [facility], the visit should take place after your next visit with our team.    Return in about 2 months (around 7/20/2024) for Follow Up.    Attending Physician Attestation: Complete documentation of historical and exam elements from today's encounter can be found in the full encounter summary report (not reduplicated in this progress note). I personally obtained the chief complaint(s) " and history of present illness. I confirmed and edited as necessary the review of systems, past medical/surgical history, family history, social history, and examination findings as documented by others; and I examined the patient myself. I personally reviewed the relevant tests, images, and reports as documented above. I formulated and edited as necessary the assessment and plan and discussed the findings and management plan with the patient and family.   -Shana Tolentino MD

## 2024-05-20 NOTE — PATIENT INSTRUCTIONS
"- Apply warm compresses for 1 minute two to three times a day until your bruising has resolved. You can continue this for one more month.   - Cool compresses can help with swelling and itching, you can alternate cool compresses with warm compresses if you find it helpful.  - The antibiotic ointment should be applied into the operative eye four times a day.    - The oral antibiotic should be taken as instructed  - If you have symptoms of eye irritation, it is good to use over the counter artificial tears. Good brands include Refresh, Blink, and Systane. Do NOT get drops that are for \"red eyes.\"   - Please schedule a visit with [facility], the visit should take place after your next visit with our team.    "

## 2024-05-20 NOTE — NURSING NOTE
Chief Complaints and History of Present Illnesses   Patient presents with    Follow Up     S/P Right eye evisceration with implant 5/16/24  Right eye hurts with a little with stinging. Patch has remained in place since surgery  Back is hurting more however. This began since surgery. Trouble sleeping due to back pain.     Left eye or non surgical eye feels very dry since surgery and unable to open left eye much since the evisceration of right eye.        Patient present today with daughter August who preferred to interpret today verses use the Language line.            Chief Complaint(s) and History of Present Illness(es)       Follow Up              Laterality: right eye    Associated symptoms: eye pain    Pain scale: 5/10    Comments: S/P Right eye evisceration with implant 5/16/24  Right eye hurts with a little with stinging. Patch has remained in place since surgery  Back is hurting more however. This began since surgery. Trouble sleeping due to back pain.     Left eye or non surgical eye feels very dry since surgery and unable to open left eye much since the evisceration of right eye.        Patient present today with daughter August who preferred to interpret today verses use the Language line.                     Comments    Neema Sheriff, COT COT 10:16 AM May 20, 2024

## 2024-05-21 ENCOUNTER — OFFICE VISIT (OUTPATIENT)
Dept: FAMILY MEDICINE | Facility: CLINIC | Age: 44
End: 2024-05-21
Payer: COMMERCIAL

## 2024-05-21 VITALS
WEIGHT: 198 LBS | RESPIRATION RATE: 16 BRPM | DIASTOLIC BLOOD PRESSURE: 65 MMHG | BODY MASS INDEX: 33.8 KG/M2 | TEMPERATURE: 97.5 F | OXYGEN SATURATION: 96 % | HEART RATE: 54 BPM | SYSTOLIC BLOOD PRESSURE: 99 MMHG | HEIGHT: 64 IN

## 2024-05-21 DIAGNOSIS — G43.009 MIGRAINE WITHOUT AURA AND WITHOUT STATUS MIGRAINOSUS, NOT INTRACTABLE: Primary | ICD-10-CM

## 2024-05-21 DIAGNOSIS — M54.50 CHRONIC BILATERAL LOW BACK PAIN WITHOUT SCIATICA: ICD-10-CM

## 2024-05-21 DIAGNOSIS — G89.29 CHRONIC BILATERAL LOW BACK PAIN WITHOUT SCIATICA: ICD-10-CM

## 2024-05-21 DIAGNOSIS — F33.1 MODERATE EPISODE OF RECURRENT MAJOR DEPRESSIVE DISORDER (H): ICD-10-CM

## 2024-05-21 PROCEDURE — 99214 OFFICE O/P EST MOD 30 MIN: CPT | Mod: GC

## 2024-05-21 RX ORDER — ACETAMINOPHEN 500 MG
500-1000 TABLET ORAL EVERY 8 HOURS PRN
Qty: 60 TABLET | Refills: 3 | Status: SHIPPED | OUTPATIENT
Start: 2024-05-21

## 2024-05-21 RX ORDER — IBUPROFEN 600 MG/1
600 TABLET, FILM COATED ORAL EVERY 8 HOURS PRN
Qty: 60 TABLET | Refills: 1 | Status: SHIPPED | OUTPATIENT
Start: 2024-05-21

## 2024-05-21 RX ORDER — NORTRIPTYLINE HCL 25 MG
25 CAPSULE ORAL AT BEDTIME
Qty: 30 CAPSULE | Refills: 0 | Status: SHIPPED | OUTPATIENT
Start: 2024-05-21 | End: 2024-06-20

## 2024-05-21 NOTE — PROGRESS NOTES
Chart reviewed. Plan remains home when medically stable. Will cont to follow for any needs. Meli Munguia RN,ext. [phone]. Preceptor Attestation:    I discussed the patient with the resident and evaluated the patient in person. I have verified the content of the note, which accurately reflects my assessment of the patient and the plan of care.   Supervising Physician:  Brenda Patton MD

## 2024-05-21 NOTE — PROGRESS NOTES
"  Assessment & Plan     Migraine without aura and without status migrainosus, not intractable  History of migraine.  Has been prescribed sumatriptan in the past with good relief.  Also has used Tylenol in the past without relief.  She has been out of these medications for the past month at least.  She reports worsening headaches since her enucleation surgery on May 16.  She reports the headache is similar to her migraines with the addition of dizziness which is new.  Headache is reported over the whole head and occasionally unilateral and worse with bright lights.  Differential includes migraine versus tension headache.  Could also be a result of the enucleation.  Plan to start nortriptyline which may help with headache and back pain and depression as described below.  Consider neurology referral in the future given the confounding factor of the enucleation.  - nortriptyline (PAMELOR) 25 MG capsule  Dispense: 30 capsule; Refill: 0  - ibuprofen (ADVIL/MOTRIN) 600 MG tablet  Dispense: 60 tablet; Refill: 1  - acetaminophen (TYLENOL) 500 MG tablet  Dispense: 60 tablet; Refill: 3    Chronic bilateral low back pain without sciatica  Pain is located around the L1-L2 region.  Patient states it is midline and feels like it is \"in the bone.\"  Pain with bending.  Denies any saddle anesthesia or loss of bowel or bladder control.  No radiating pain.  On exam full strength and sensation in the lower extremities bilaterally.  Plan to start the nortriptyline as above as well as Tylenol and ibuprofen as needed.   - nortriptyline (PAMELOR) 25 MG capsule  Dispense: 30 capsule; Refill: 0  - ibuprofen (ADVIL/MOTRIN) 600 MG tablet  Dispense: 60 tablet; Refill: 1  - acetaminophen (TYLENOL) 500 MG tablet  Dispense: 60 tablet; Refill: 3    Moderate episode of recurrent major depressive disorder (H)  Reports depression since the birth of her last child about 2 to 3 years ago.  She was treated with sertraline and had good response.  She says " that this was stopped 1 to 2 months ago when she was told the pharmacy that she had no refills left.  She has tried to go to therapy in the past but did not think it was helpful.  Has mostly depressed mood and anhedonia.  Plan to start nortriptyline which will help with mood as well as the back pain and headaches.  Follow-up in 4 weeks to see if symptoms have improved.  - nortriptyline (PAMELOR) 25 MG capsule  Dispense: 30 capsule; Refill: 0        Subjective   Ed is a 44 year old, presenting for the following health issues:  Headache (Head ache/ migraine ), Back Pain (Back pain ), and Eye Problem (Right eye closed shut - swollen and crust forming the in the inner corners. )        5/21/2024     8:04 AM   Additional Questions   Roomed by RUDY muhammad MA   Accompanied by Self         5/21/2024    Information    services provided? Yes   Tiburcio Kramer   Type of interpretation provided Face-to-face    name Ana Calloway    Agency [facility]     Migraine   Since your last clinic visit, how have your headaches changed?  Worsened since eye surgery on 5/16/2024.  How often are you getting headaches or migraines? Daily   Are you taking rescue/relief medications? (Select all that apply) No  How helpful is your rescue/relief medication?  I get total relief  Are you taking any medications to prevent migraines? (Select all that apply)  No    Chronic/Recurring Back Pain Follow Up    Where is your back pain located? (Select all that apply) low back in the middle  How would you describe your back pain?  dull ache  Where does your back pain spread? nowhere  Since your last clinic visit for back pain, how has your pain changed? unchanged  Does your back pain interfere with your job? Not applicable  Since your last visit, have you tried any new treatment? Yes -  acetaminophen (Tylenol)    Depression  Patient reports having depression since the birth of her last child about 2 to 3 years ago.   "She was treated with sertraline and had good response.  She says that this was stopped 1 to 2 months ago when she was told the pharmacy that she had no refills left.  She has tried to go to therapy in the past but did not think it was helpful.  Has mostly depressed mood and anhedonia.      Objective    BP 99/65   Pulse 54   Temp 97.5  F (36.4  C) (Oral)   Resp 16   Ht 1.618 m (5' 3.7\")   Wt 89.8 kg (198 lb)   LMP 05/10/2024 (Exact Date)   SpO2 96%   BMI 34.31 kg/m    Body mass index is 34.31 kg/m .  Physical Exam   GENERAL: alert and no distress  EYES: Right eye status post enucleation with implant.  Left eye normal to inspection with PERRL.  PSYCH: mentation appears normal and affect flat    No results found for this or any previous visit (from the past 24 hour(s)).        Signed Electronically by: Jesse Hernandez MD  "

## 2024-05-22 LAB
PATH REPORT.COMMENTS IMP SPEC: NORMAL
PATH REPORT.FINAL DX SPEC: NORMAL
PATH REPORT.GROSS SPEC: NORMAL
PATH REPORT.MICROSCOPIC SPEC OTHER STN: NORMAL
PATH REPORT.RELEVANT HX SPEC: NORMAL
PHOTO IMAGE: NORMAL

## 2024-05-31 ENCOUNTER — OFFICE VISIT (OUTPATIENT)
Dept: FAMILY MEDICINE | Facility: CLINIC | Age: 44
End: 2024-05-31
Payer: COMMERCIAL

## 2024-05-31 VITALS
HEART RATE: 50 BPM | TEMPERATURE: 97.7 F | OXYGEN SATURATION: 97 % | RESPIRATION RATE: 16 BRPM | BODY MASS INDEX: 33.67 KG/M2 | WEIGHT: 197.2 LBS | DIASTOLIC BLOOD PRESSURE: 76 MMHG | SYSTOLIC BLOOD PRESSURE: 114 MMHG | HEIGHT: 64 IN

## 2024-05-31 DIAGNOSIS — Z98.890 POSTOPERATIVE EYE STATE: ICD-10-CM

## 2024-05-31 DIAGNOSIS — G43.019 INTRACTABLE MIGRAINE WITHOUT AURA AND WITHOUT STATUS MIGRAINOSUS: Primary | ICD-10-CM

## 2024-05-31 DIAGNOSIS — H57.11 BLIND PAINFUL RIGHT EYE: ICD-10-CM

## 2024-05-31 DIAGNOSIS — E66.01 MORBID OBESITY (H): ICD-10-CM

## 2024-05-31 DIAGNOSIS — H54.40 BLIND PAINFUL RIGHT EYE: ICD-10-CM

## 2024-05-31 DIAGNOSIS — F32.4 MAJOR DEPRESSIVE DISORDER IN PARTIAL REMISSION, UNSPECIFIED WHETHER RECURRENT (H): ICD-10-CM

## 2024-05-31 PROCEDURE — 99214 OFFICE O/P EST MOD 30 MIN: CPT | Mod: GC

## 2024-05-31 RX ORDER — NEOMYCIN SULFATE, POLYMYXIN B SULFATE, AND DEXAMETHASONE 3.5; 10000; 1 MG/G; [USP'U]/G; MG/G
0.5 OINTMENT OPHTHALMIC 4 TIMES DAILY
Qty: 3.5 G | Refills: 2 | Status: SHIPPED | OUTPATIENT
Start: 2024-05-31 | End: 2024-06-20

## 2024-05-31 RX ORDER — NEOMYCIN SULFATE, POLYMYXIN B SULFATE, AND DEXAMETHASONE 3.5; 10000; 1 MG/G; [USP'U]/G; MG/G
0.5 OINTMENT OPHTHALMIC 4 TIMES DAILY
Qty: 3.5 G | Refills: 1 | Status: CANCELLED | OUTPATIENT
Start: 2024-05-31

## 2024-05-31 ASSESSMENT — PATIENT HEALTH QUESTIONNAIRE - PHQ9: SUM OF ALL RESPONSES TO PHQ QUESTIONS 1-9: 3

## 2024-05-31 NOTE — PROGRESS NOTES
Assessment & Plan     Intractable migraine without aura and without status migrainosus  Started Nortriptyline 25 mg at bedtime. Feels it is helping. Has some residual pain thinks related to eye and wants to hold on adjustments at this time.  - Continue Nortriptyline 25 mg at bedtime    Major depressive disorder in partial remission, unspecified whether recurrent (H24)  PHQ 9 score 3 today. Doing well and does not want to adjust anything.     Morbid obesity (H)  Stable. Previously discussed diet/exercise recommendations.     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  She is requesting refill of her eye antibiotic ointment.  I cannot tell if this was supposed to be continued or not.  I did send a message to one of her surgeons requesting clarification on this.  I also placed a photo of the eye in the chart.  On exam she has difficulty opening kind of the lateral portion of her eye, unclear if this is normal or not.    -I discussed with her to follow-up with her eye surgeon  -Or call using the language line as soon as she can  -I did not refill her eye ointment today, will wait until hearing from her provider  -Message to her eye surgeon send at time of visit    Addendum:  Response from surgeon received, okay to refill. Refill sent to pharmacy. Will have PCS notify patient.       No follow-ups on file.    Subjective   Moo is a 44 year old, presenting for the following health issues:  Follow Up (Depression and migraine. Not feeling better with the migraine )        5/31/2024     8:55 AM   Additional Questions   Roomed by    Accompanied by self         5/31/2024    Information    services provided? Yes   Tiburcio Kramer   Type of interpretation provided Face-to-face    name anna gonsalves    [employer]     Presents today to follow up depression and migraines. She recently had eye removal  "surgery.  PHQ-9 score 3. Feeling depressed sometimes, not everyday. She does not experience depression every week, maybe 1-2 times per month. Feeling good overall and does not want to change any additional medication.   KAYLAH 7 score 1. Feels minimal anxiety.  Feels like migraines are more mild with pain. She is having pain related to here eye surgery still and thinks it is related. Now more on right sided pain. Describes pain as mild but constant. Does endorse improvement with new medication.         Review of Systems  Constitutional, HEENT, cardiovascular, pulmonary, gi and gu systems are negative, except as otherwise noted.      Objective    /76   Pulse 50   Temp 97.7  F (36.5  C) (Tympanic)   Resp 16   Ht 1.618 m (5' 3.7\")   Wt 89.4 kg (197 lb 3.2 oz)   LMP 05/10/2024 (Exact Date)   SpO2 97%   BMI 34.17 kg/m    Body mass index is 34.17 kg/m .  Physical Exam   GENERAL: alert and no distress  EYES: Right eye implant.  RESP: lungs clear to auscultation - no rales, rhonchi or wheezes  CV: regular rate and rhythm, normal S1 S2, no S3 or S4, no murmur, click or rub, no peripheral edema  MS: no gross musculoskeletal defects noted, no edema  NEURO: Normal strength and tone, mentation intact and speech normal          Signed Electronically by: Puja Guerrero MD  "

## 2024-06-20 ENCOUNTER — OFFICE VISIT (OUTPATIENT)
Dept: FAMILY MEDICINE | Facility: CLINIC | Age: 44
End: 2024-06-20
Payer: COMMERCIAL

## 2024-06-20 VITALS
HEART RATE: 54 BPM | OXYGEN SATURATION: 97 % | WEIGHT: 194.4 LBS | RESPIRATION RATE: 18 BRPM | TEMPERATURE: 97.9 F | DIASTOLIC BLOOD PRESSURE: 69 MMHG | BODY MASS INDEX: 34.45 KG/M2 | SYSTOLIC BLOOD PRESSURE: 102 MMHG | HEIGHT: 63 IN

## 2024-06-20 DIAGNOSIS — H81.11 BENIGN PAROXYSMAL POSITIONAL VERTIGO OF RIGHT EAR: ICD-10-CM

## 2024-06-20 DIAGNOSIS — Z98.890 POSTOPERATIVE EYE STATE: ICD-10-CM

## 2024-06-20 DIAGNOSIS — G43.009 MIGRAINE WITHOUT AURA AND WITHOUT STATUS MIGRAINOSUS, NOT INTRACTABLE: Primary | ICD-10-CM

## 2024-06-20 PROCEDURE — 99213 OFFICE O/P EST LOW 20 MIN: CPT | Mod: GC

## 2024-06-20 RX ORDER — NEOMYCIN SULFATE, POLYMYXIN B SULFATE, AND DEXAMETHASONE 3.5; 10000; 1 MG/G; [USP'U]/G; MG/G
0.5 OINTMENT OPHTHALMIC 4 TIMES DAILY
Qty: 3.5 G | Refills: 2 | Status: SHIPPED | OUTPATIENT
Start: 2024-06-20

## 2024-06-20 RX ORDER — SUMATRIPTAN 25 MG/1
25 TABLET, FILM COATED ORAL
Qty: 30 TABLET | Refills: 1 | Status: SHIPPED | OUTPATIENT
Start: 2024-06-20

## 2024-06-20 RX ORDER — NORTRIPTYLINE HCL 25 MG
25 CAPSULE ORAL AT BEDTIME
Qty: 30 CAPSULE | Refills: 0 | Status: SHIPPED | OUTPATIENT
Start: 2024-06-20

## 2024-06-20 NOTE — PROGRESS NOTES
Assessment & Plan     Benign paroxysmal positional vertigo of right ear  Patient has been having nausea with movement since 2021.  This is associated with dizziness and room spinning.  No associated hearing troubles.  Francisco Javier-Hallpike maneuver on the right caused dizziness and nausea though no nystagmus was noted.  Epley maneuver was attempted without relief.  Suspect BPPV though Francisco Javier-Hallpike was not conclusive.  Patient is open to PT referral for another attempt and will try the procedure at home.  - Physical Therapy  Referral     Migraine without aura and without status migrainosus, not intractable  Patient has had well-controlled migraines over the last month.  Now having 0-2/week.  Good relief from sumatriptan.  Plan to refill both nortriptyline and sumatriptan.  - SUMAtriptan (IMITREX) 25 MG tablet  Dispense: 30 tablet; Refill: 1  - nortriptyline (PAMELOR) 25 MG capsule  Dispense: 30 capsule; Refill: 0    Postoperative eye state  - Refill neomycin-polymyxin-dexAMETHasone (MAXITROL) 3.5-04306-1.1 ophthalmic ointment  Dispense: 3.5 g; Refill: 2        Subjective   Moo is a 44 year old, presenting for the following health issues:  RECHECK (4 WEEKS CK ) and Headache (Migraine and HA)        6/20/2024     8:07 AM   Additional Questions   Roomed by OLGA   Accompanied by SELF         6/20/2024    Information    services provided? Yes   Tiburcio Kramer   Type of interpretation provided Face-to-face    name MILLIE POWELL    Ag[employer]      HPI       Migraine   Since your last clinic visit, how have your headaches changed?  Improved  How often are you getting headaches or migraines? 0-2 per week  Are you taking rescue/relief medications? (Select all that apply) sumatriptan (Imitrex)  How helpful is your rescue/relief medication?  I get total relief  Are you taking any medications to prevent migraines? (Select all that apply)  Other: Nortriptyline       Objective    BP  "102/69 (BP Location: Left arm, Patient Position: Sitting, Cuff Size: Adult Large)   Pulse 54   Temp 97.9  F (36.6  C) (Oral)   Resp 18   Ht 1.6 m (5' 3\")   Wt 88.2 kg (194 lb 6.4 oz)   LMP 05/08/2024 (Approximate)   SpO2 97%   BMI 34.44 kg/m    Body mass index is 34.44 kg/m .  Physical Exam   GENERAL: alert and no distress  [place]-Hallpike maneuver on the right produced dizziness and nausea.           Signed Electronically by: Jesse Hernandez MD  "

## 2024-06-20 NOTE — PROGRESS NOTES
"Preceptor attestation:  Vital signs reviewed: /69 (BP Location: Left arm, Patient Position: Sitting, Cuff Size: Adult Large)   Pulse 54   Temp 97.9  F (36.6  C) (Oral)   Resp 18   Ht 1.6 m (5' 3\")   Wt 88.2 kg (194 lb 6.4 oz)   LMP 05/08/2024 (Approximate)   SpO2 97%   BMI 34.44 kg/m      Patient seen, evaluated, and discussed with the resident.  I verified the content of the note, which accurately reflects my assessment of the patient and the plan of care.    Supervising physician: Barbara Wright MD  [facility]  "

## 2024-07-18 ENCOUNTER — OFFICE VISIT (OUTPATIENT)
Dept: OPHTHALMOLOGY | Facility: CLINIC | Age: 44
End: 2024-07-18
Payer: COMMERCIAL

## 2024-07-18 DIAGNOSIS — Q11.1 ANOPHTHALMIA: Primary | ICD-10-CM

## 2024-07-18 PROCEDURE — 99024 POSTOP FOLLOW-UP VISIT: CPT | Performed by: OPHTHALMOLOGY

## 2024-07-18 ASSESSMENT — VISUAL ACUITY
OS_SC: 20/25
OS_SC+: -2
OD_SC: EVIS
METHOD: SNELLEN - LINEAR

## 2024-07-18 ASSESSMENT — TONOMETRY
OS_IOP_MMHG: 20
IOP_METHOD: ICARE

## 2024-07-18 NOTE — PATIENT INSTRUCTIONS
You should see the  to have a prosthesis made.    Contact [facility]: [phone]  ([email or web address]):         [facility] Locations:    [facility] ([place], [place])  [facility] Office Building  [address]  [place], [address]  Office: [phone]  Toll Free: [phone]    ---

## 2024-07-18 NOTE — PROGRESS NOTES
Chief Complaint(s) and History of Present Illness(es)     Post Op (Ophthalmology) Right Eye            Laterality: right eye    Onset: 2 months ago    Severity: mild    Course: gradually improving          Comments    Visit assisted by  over the phone.  Patient is POM#2   evisceration of right eye.  No pain.  No redness.  No problems at all.      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  Band keratopathy.  Corneal stromal neovascularization and scarring.  Cortical cataract.  Dystrophic calcification and osseous metaplasia at the level of the ciliary body.  Severe atrophy and disorganization of the retina with focal dystrophic calcification.  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    E         Assessment & Plan     Ed Rangel is a 44 year old female with the following diagnoses:   Encounter Diagnosis   Name Primary?    Anophthalmia Yes     Healthy anophthalmic socket   She has an appointment next month with the  (per her report).   She reports she has comprehensive eye exams in [place].     I will see her back in one year.        Attending Physician Attestation: Complete documentation of historical and exam elements from today's encounter can be found in the full encounter summary report (not reduplicated in this progress note). I personally obtained the chief complaint(s) and history of present illness. I confirmed and edited as necessary the review of systems, past medical/surgical history, family history, social history, and examination findings as documented by others; and I examined the patient myself. I personally reviewed the relevant tests, images, and reports as documented above. I formulated and edited as necessary the assessment and plan and discussed the findings and management plan with the  patient.  -Zita Staley MD

## 2024-07-18 NOTE — NURSING NOTE
Chief Complaints and History of Present Illnesses   Patient presents with    Post Op (Ophthalmology) Right Eye     Chief Complaint(s) and History of Present Illness(es)       Post Op (Ophthalmology) Right Eye              Laterality: right eye    Onset: 2 months ago    Severity: mild    Course: gradually improving              Comments    Visit assisted by  over the phone.  Patient is POM#2 evisceration of right eye.  No pain.  No redness.      Renetta Owens on 7/18/2024 at 10:07 AM

## 2024-07-21 NOTE — PROGRESS NOTES
PHYSICAL THERAPY EVALUATION  Type of Visit: Evaluation       Fall Risk Screen:  Fall screen completed by: PT  Have you fallen 2 or more times in the past year?: No  Have you fallen and had an injury in the past year?: No  Is patient a fall risk?: No    Subjective   Pt is a 44 year-old woman with chief complaint chronic dizziness. When doing exercises/motions she will have nausea, when walking has no symptoms. Bed mobility can sometimes cause symptoms. Sometimes the room is spinning, but normal episodes do not involve room spinning, but feels like discomfort, no symptoms in her head. The discomfort is a feeling in her throat. She was told to try the right Epley at home and had symptoms when doing it; she was trying not to push herself.       Presenting condition or subjective complaint: while in motion have experience nausea.  Date of onset: 06/20/24 (date of order)    Relevant medical history:     Dates & types of surgery: eyes sugery ,one months ago    Prior diagnostic imaging/testing results: MRI; X-ray     Prior therapy history for the same diagnosis, illness or injury:        Prior Level of Function  Transfers: Independent  Ambulation: Independent  ADL: Independent      Living Environment  Social support:     Type of home:     Stairs to enter the home:         Ramp:     Stairs inside the home:         Help at home:    Equipment owned:       Employment:      Hobbies/Interests:      Patient goals for therapy: normal activities    Pain assessment: Pain denied     Objective      Cognitive Status Examination  Orientation: Oriented to person, place and time   Level of Consciousness: Alert  Follows Commands and Answers Questions: 75% of the time      OBSERVATION: Pt wearing sunglasses d/t light (was OK with dimmed light when removing sunglasses)      BED MOBILITY: Independent    TRANSFERS: Independent      GAIT:   Level of [place]: Independent  Assistive Device(s): None  Gait Deviations: WNL           VESTIBULAR  EVALUATION  ADDITIONAL HISTORY:  Description of symptoms:    Dizzy attacks:   Start:     Last attack:     Frequency of occurrences:     Length of attack:    Difficulty hearing:    Noise in ears?      Alleviates symptoms:    Worsens symptoms:    Activities that bring on symptoms:         DHI:      Cervicogenic Screen    Neck ROM WNL in all directions with mild symptoms with cervical flx    Vertebral Artery Test    Alar Ligament Test    Transverse Ligament Test    Distraction    Neck Torsion Test (head still, body rotating)    Neck Torsion Test (head and body rotating)         Oculomotor Screen    Ocular ROM Normal - left eye    Smooth Pursuit Abnormal - left eye, intrusive saccades with all directions    Saccades Normal   VOR Normal with horizontal movements, symptom reproduction with vertical head movements   VOR Cancellation    Head Impulse Test    Convergence Testing         Infrared Goggle Exam Vestibular Suppressant in Last 24 Hours? No  Exam Completed With: Infrared goggles   Spontaneous Nystagmus Horizontal R   Gaze Evoked Nystagmus Horizontal R   Head Shake Horizontal Nystagmus    Positional Testing    Supine Head-Hanging Test     Left Right   Francisco Javier-Hallpike Very unclear testing, pt had trouble opening her left eye, could glimpse right horizontal nystagmus. Pt reported mild symptoms, but could not identify if these were the same she feels at home when having episodes. Symptoms did not diminish over time; pt denied room spinning.  Very unclear testing, pt had trouble opening her left eye, could glimpse right horizontal nystagmus. Pt reported mild symptoms, but could not identify if these were the same she feels at home when having episodes. Symptoms did not diminish over time; pt denied room spinning.    Sidelying Test     [facility] Supine Roll Test Horizontal R, non-paroxysmal, non-abating. Mild symptoms, but not similar to feelings pt will have at home.   Challenging with testing, pt keeping her eye closed most of  the time.  Horizontal R, non-paroxysmal, non-abating. No symptoms. Challenging with testing, pt keeping her eye closed most of the time.    [facility] Forward Roll Test     Carson and Lean Test -  Sitting Erect    Carson and Lean Test - Seated, Head Bent 60 Degrees Forward    Carson and Lean Test - Seated, Head Bent Backwards       BPPV Canal(s): Unclear, no specific BPPV noted, pt having difficulty describing symptoms and how this relates to her symptoms she has at home.   BPPV Type:       Assessment & Plan   CLINICAL IMPRESSIONS  Medical Diagnosis: Benign paroxysmal positional vertigo of right ear    Treatment Diagnosis: Dizziness   Impression/Assessment: Pt is a 44 year-old woman with chief complaint chronic, intermittent and non-specific dizziness (sometimes room spinning, sometimes a non-descript  discomfort  at the anterior throat/chest). Unclear testing today - pt has only use of the left eye, kept this eye closed most of the time during testing, so it was hard to appreciate nystagmus. In addition, pt had difficulty answering questions about her symptoms. She demonstrates right horizontal nystagmus at rest and with all positional testing using IR goggles. She was symptomatic with vertical head pitch with VOR x 1 and reports that these symptoms are similar to what she typically feels at home. Based on this, will try vestibular adaptation with pt with a short course of therapy to determine benefit.     Clinical Decision Making (Complexity):  Clinical Presentation: Stable/Uncomplicated  Clinical Presentation Rationale: based on medical and personal factors listed in PT evaluation  Clinical Decision Making (Complexity): High complexity    PLAN OF CARE  Treatment Interventions:  Interventions: Gait Training, Manual Therapy, Neuromuscular Re-education, Therapeutic Activity, Therapeutic Exercise, Self-Care/Home Management, Canalith Repositioning    Long Term Goals     PT Goal 1  Goal Identifier: Dizziness  Goal Description: Pt  will report reduced dizziness symptoms by 50% to allow her to perform household cleaning tasks with better efficiency.  Target Date: 10/19/24      Frequency of Treatment: 1x/month  Duration of Treatment: 3-4 visits, up to 90 days         Risks and benefits of evaluation/treatment have been explained.   Patient/Family/caregiver agrees with Plan of Care.     Evaluation Time:     PT Eval, High Complexity Minutes (78172): 29   Present: Yes: Language: Nia, ID Number/Identifier: [id number]     Signing Clinician: Nan Sow PT        Psychiatric                                                                                   OUTPATIENT PHYSICAL THERAPY      PLAN OF TREATMENT FOR OUTPATIENT REHABILITATION   Patient's Last Name, First Name, M.I.  Say,Moo    YOB: 1980   Provider's Name   Psychiatric   Medical Record No.  [id number]     Onset Date: 06/20/24 (date of order)  Start of Care Date: 07/22/24     Medical Diagnosis:  Benign paroxysmal positional vertigo of right ear      PT Treatment Diagnosis:  Dizziness Plan of Treatment  Frequency/Duration: 1x/month/ 3-4 visits, up to 90 days    Certification date from 07/22/24 to 10/19/24         See note for plan of treatment details and functional goals     Nan Sow, PT                         I CERTIFY THE NEED FOR THESE SERVICES FURNISHED UNDER        THIS PLAN OF TREATMENT AND WHILE UNDER MY CARE     (Physician attestation of this document indicates review and certification of the therapy plan).              Referring Provider:  Barbara Wright    Initial Assessment  See Epic Evaluation- Start of Care Date: 07/22/24

## 2024-07-22 ENCOUNTER — THERAPY VISIT (OUTPATIENT)
Dept: PHYSICAL THERAPY | Facility: REHABILITATION | Age: 44
End: 2024-07-22
Attending: FAMILY MEDICINE
Payer: COMMERCIAL

## 2024-07-22 DIAGNOSIS — H81.11 BENIGN PAROXYSMAL POSITIONAL VERTIGO OF RIGHT EAR: ICD-10-CM

## 2024-07-22 DIAGNOSIS — R42 DIZZINESS: Primary | ICD-10-CM

## 2024-07-22 PROCEDURE — 97163 PT EVAL HIGH COMPLEX 45 MIN: CPT | Mod: GP | Performed by: PHYSICAL THERAPIST

## 2024-07-22 PROCEDURE — 97112 NEUROMUSCULAR REEDUCATION: CPT | Mod: GP | Performed by: PHYSICAL THERAPIST

## 2024-08-29 ENCOUNTER — MEDICAL CORRESPONDENCE (OUTPATIENT)
Dept: HEALTH INFORMATION MANAGEMENT | Facility: CLINIC | Age: 44
End: 2024-08-29
Payer: COMMERCIAL

## 2024-11-21 ENCOUNTER — PATIENT OUTREACH (OUTPATIENT)
Dept: CARE COORDINATION | Facility: CLINIC | Age: 44
End: 2024-11-21
Payer: COMMERCIAL

## 2024-12-05 ENCOUNTER — PATIENT OUTREACH (OUTPATIENT)
Dept: CARE COORDINATION | Facility: CLINIC | Age: 44
End: 2024-12-05
Payer: COMMERCIAL

## 2024-12-12 ENCOUNTER — TELEPHONE (OUTPATIENT)
Dept: FAMILY MEDICINE | Facility: CLINIC | Age: 44
End: 2024-12-12
Payer: COMMERCIAL

## 2024-12-12 NOTE — TELEPHONE ENCOUNTER
Attempt to call pt to make appointment for Hpylori retest but pt currently don't have active insurance.      ML

## 2025-02-12 ENCOUNTER — OFFICE VISIT (OUTPATIENT)
Dept: FAMILY MEDICINE | Facility: CLINIC | Age: 45
End: 2025-02-12
Payer: COMMERCIAL

## 2025-02-12 VITALS
SYSTOLIC BLOOD PRESSURE: 110 MMHG | TEMPERATURE: 98.3 F | RESPIRATION RATE: 18 BRPM | WEIGHT: 205 LBS | DIASTOLIC BLOOD PRESSURE: 73 MMHG | OXYGEN SATURATION: 99 % | HEIGHT: 63 IN | BODY MASS INDEX: 36.32 KG/M2 | HEART RATE: 61 BPM

## 2025-02-12 DIAGNOSIS — Z23 ENCOUNTER FOR ADMINISTRATION OF VACCINE: ICD-10-CM

## 2025-02-12 DIAGNOSIS — G47.30 SLEEP APNEA, UNSPECIFIED TYPE: ICD-10-CM

## 2025-02-12 DIAGNOSIS — G43.009 MIGRAINE WITHOUT AURA AND WITHOUT STATUS MIGRAINOSUS, NOT INTRACTABLE: Primary | ICD-10-CM

## 2025-02-12 RX ORDER — SUMATRIPTAN SUCCINATE 25 MG/1
25 TABLET ORAL
Qty: 30 TABLET | Refills: 1 | Status: SHIPPED | OUTPATIENT
Start: 2025-02-12

## 2025-02-12 RX ORDER — NORTRIPTYLINE HYDROCHLORIDE 25 MG/1
25 CAPSULE ORAL AT BEDTIME
Qty: 30 CAPSULE | Refills: 1 | Status: SHIPPED | OUTPATIENT
Start: 2025-02-12

## 2025-02-12 ASSESSMENT — PATIENT HEALTH QUESTIONNAIRE - PHQ9: SUM OF ALL RESPONSES TO PHQ QUESTIONS 1-9: 8

## 2025-02-12 NOTE — PROGRESS NOTES
Assessment & Plan     Migraine without aura and without status migrainosus, not intractable  Patient presents with a 2-week headache that she describes as very similar to her previous migraines.  No aura.  Sometimes it fluctuates in location.  Currently she points to the top of her head and her forehead.  No red flag symptoms such as fever, vomiting, thunderclap headache.  No recent falls.  Examination is unremarkable.  Vitals within normal limits.  She has been taking Tylenol twice daily with some relief.  She had been on Imitrex and nortriptyline in the past but ran out.  She is hoping to get back on these medications.  We discussed the indications and the administration of these medications with the Imitrex being taken only at the onset of symptoms.  Nortriptyline at bedtime.  She understands this and is in agreement with the plan.  Recommend 1 month follow-up to see how she is doing on these medicines and to check in on her migraine symptoms.  - SUMAtriptan (IMITREX) 25 MG tablet  Dispense: 30 tablet; Refill: 1  - nortriptyline (PAMELOR) 25 MG capsule  Dispense: 30 capsule; Refill: 1    Encounter for administration of vaccine  -Flu vaccine    Sleep apnea, unspecified type  Patient reports feeling tired throughout the day.  This has been going on for many months to many years.  Not associated with her migraines.  She does have a history of depression but denies any depressive symptoms today.  She feels tired throughout the day and sometimes has headaches in the morning.  She says that she snores.  She has not had a sleep study.  STOP-BANG score of 3. she is interested in getting a sleep study to find out if she has sleep apnea.  She may benefit from CPAP.  - Sleep Study Referral    Return in about 4 weeks (around 3/12/2025).    Subjective   Moo is a 44 year old, presenting for the following health issues:  Headache (Started a bout a week, on and off ) and Sleep Problem (Over sleeping )        2/12/2025     9:13  "AM   Additional Questions   Roomed by ML   Accompanied by self         2/12/2025    Information    services provided? Yes   Language Nia   Type of interpretation provided Face-to-face    name Ana Calloway    [employer]     HPI       Hx migraine previously controlled on imitrex and nortriptyline here for global headache ongoing for last 2 weeks. Ran out of meds 1 year ago. Has been taking tylenol BID with some relief. Usually her headaches last \"can't remember.\" No sudden or new vision changes. Endorses photophobia and blurry vision, the blurry vision is not new. No vomiting or fevers. Endorses nausea. 5/10 pain. Feels similar to previous migraines.     Poor sleep and daytime fatigue for 2 years. Snores. Unsure if observed apneic episodes. Does not feel well rested. Ocassional morning headaches too.     Migraine   Since your last clinic visit, how have your headaches changed? Stable   How often are you getting headaches or migraines? 2-3x/month   Are you taking rescue/relief medications? (Select all that apply) none  How helpful is your rescue/relief medication?  Used to get total relief but ran out  Are you taking any medications to prevent migraines? (Select all that apply)  none    Review of Systems  Constitutional, HEENT, cardiovascular, pulmonary, gi and gu systems are negative, except as otherwise noted.      Objective    /73   Pulse 61   Temp 98.3  F (36.8  C) (Oral)   Resp 18   Ht 1.61 m (5' 3.39\")   Wt 93 kg (205 lb)   LMP 01/15/2025 (Approximate)   SpO2 99%   BMI 35.87 kg/m    Body mass index is 35.87 kg/m .  Physical Exam   GENERAL: alert and no distress  MS: no gross musculoskeletal defects noted, no edema  SKIN: no suspicious lesions or rashes  NEURO: Normal strength and tone, mentation intact and speech normal  PSYCH: mentation appears normal, affect normal/bright            Signed Electronically by: oJrdon Plasencia MD    "

## 2025-02-12 NOTE — PROGRESS NOTES
"Preceptor attestation:  Vital signs reviewed: /73   Pulse 61   Temp 98.3  F (36.8  C) (Oral)   Resp 18   Ht 1.61 m (5' 3.39\")   Wt 93 kg (205 lb)   LMP 01/15/2025 (Approximate)   SpO2 99%   BMI 35.87 kg/m      Patient seen, evaluated, and discussed with the resident.  I verified the content of the note, which accurately reflects my assessment of the patient and the plan of care.    Supervising physician: Barbara Wright MD  [facility]  "

## 2025-08-28 ENCOUNTER — OFFICE VISIT (OUTPATIENT)
Dept: FAMILY MEDICINE | Facility: CLINIC | Age: 45
End: 2025-08-28
Payer: COMMERCIAL

## 2025-08-28 ENCOUNTER — ANCILLARY PROCEDURE (OUTPATIENT)
Dept: GENERAL RADIOLOGY | Facility: CLINIC | Age: 45
End: 2025-08-28
Attending: FAMILY MEDICINE
Payer: COMMERCIAL

## 2025-08-28 VITALS
RESPIRATION RATE: 16 BRPM | DIASTOLIC BLOOD PRESSURE: 73 MMHG | HEIGHT: 63 IN | WEIGHT: 214.4 LBS | TEMPERATURE: 99.5 F | HEART RATE: 61 BPM | SYSTOLIC BLOOD PRESSURE: 109 MMHG | BODY MASS INDEX: 37.99 KG/M2 | OXYGEN SATURATION: 97 %

## 2025-08-28 DIAGNOSIS — G89.29 CHRONIC BILATERAL LOW BACK PAIN WITHOUT SCIATICA: Primary | ICD-10-CM

## 2025-08-28 DIAGNOSIS — G43.009 MIGRAINE WITHOUT AURA AND WITHOUT STATUS MIGRAINOSUS, NOT INTRACTABLE: ICD-10-CM

## 2025-08-28 DIAGNOSIS — M54.50 CHRONIC BILATERAL LOW BACK PAIN WITHOUT SCIATICA: Primary | ICD-10-CM

## 2025-08-28 DIAGNOSIS — F33.1 MODERATE EPISODE OF RECURRENT MAJOR DEPRESSIVE DISORDER (H): ICD-10-CM

## 2025-08-28 RX ORDER — LIDOCAINE 4 G/G
1 PATCH TOPICAL EVERY 24 HOURS
Qty: 30 PATCH | Refills: 2 | Status: SHIPPED | OUTPATIENT
Start: 2025-08-28

## 2025-08-28 RX ORDER — NORTRIPTYLINE HYDROCHLORIDE 25 MG/1
25 CAPSULE ORAL AT BEDTIME
Qty: 90 CAPSULE | Refills: 1 | Status: SHIPPED | OUTPATIENT
Start: 2025-08-28

## 2025-08-28 ASSESSMENT — PATIENT HEALTH QUESTIONNAIRE - PHQ9: SUM OF ALL RESPONSES TO PHQ QUESTIONS 1-9: 13

## 2025-09-04 ENCOUNTER — RESULTS FOLLOW-UP (OUTPATIENT)
Dept: FAMILY MEDICINE | Facility: CLINIC | Age: 45
End: 2025-09-04
Payer: COMMERCIAL

## (undated) DEVICE — BLADE KNIFE SURG 11 371111

## (undated) DEVICE — SU PLAIN 6-0 G-1DA 18" 770G

## (undated) DEVICE — TAPE ELASTIKON 2" LATEX

## (undated) DEVICE — ADH LIQUID MASTISOL TOPICAL VIAL 2-3ML 0523-48

## (undated) DEVICE — SOL WATER IRRIG 500ML BOTTLE 2F7113

## (undated) DEVICE — PACK MINOR EYE CUSTOM ASC

## (undated) DEVICE — SPONGE SURGIFOAM 100 1974

## (undated) DEVICE — SYR 50ML LL W/O NDL 309653

## (undated) DEVICE — SPECIMEN CONTAINER W/10% BUFFERED FORMALIN 120ML 591201

## (undated) DEVICE — CUP AND LID 2PK 2OZ STERILE  SSK9006A

## (undated) DEVICE — SOL NACL 0.9% IRRIG 500ML BOTTLE 2F7123

## (undated) DEVICE — EYE SHIELD PLASTIC

## (undated) DEVICE — EYE CONFORMER MED S6.2231U

## (undated) DEVICE — SU MONOCRYL 5-0 P-3 18" UND Y493G

## (undated) DEVICE — APPLICATOR COTTON TIP 3" PKG OF 10 34831010

## (undated) DEVICE — IMPLANTABLE DEVICE: Type: IMPLANTABLE DEVICE | Site: EYE | Status: NON-FUNCTIONAL

## (undated) DEVICE — SOL BENZOIN 0.5OZ

## (undated) DEVICE — ESU CORD BIPOLAR GREEN 10-4000

## (undated) DEVICE — EYE DRSG PAD OVAL

## (undated) DEVICE — LABEL MEDICATION SYSTEM 3303-P

## (undated) DEVICE — LINEN TOWEL PACK X5 5464

## (undated) DEVICE — EYE NDL RETROBULBAR ATKINSON 25GA 1.5" 581637

## (undated) DEVICE — GLOVE BIOGEL PI MICRO SZ 7.5 48575

## (undated) DEVICE — TUBING SUCTION MEDI-VAC 1/4"X20' N620A

## (undated) DEVICE — SUCTION MANIFOLD NEPTUNE 2 SYS 1 PORT 702-025-000

## (undated) RX ORDER — TRANEXAMIC ACID 100 MG/ML
INJECTION, SOLUTION INTRAVENOUS
Status: DISPENSED
Start: 2024-05-16

## (undated) RX ORDER — PROPOFOL 10 MG/ML
INJECTION, EMULSION INTRAVENOUS
Status: DISPENSED
Start: 2024-05-16

## (undated) RX ORDER — ACETAMINOPHEN 325 MG/1
TABLET ORAL
Status: DISPENSED
Start: 2024-05-16

## (undated) RX ORDER — GENTAMICIN 40 MG/ML
INJECTION, SOLUTION INTRAMUSCULAR; INTRAVENOUS
Status: DISPENSED
Start: 2024-05-16

## (undated) RX ORDER — FENTANYL CITRATE 50 UG/ML
INJECTION, SOLUTION INTRAMUSCULAR; INTRAVENOUS
Status: DISPENSED
Start: 2024-05-16

## (undated) RX ORDER — CEFAZOLIN SODIUM 2 G/50ML
SOLUTION INTRAVENOUS
Status: DISPENSED
Start: 2024-05-16

## (undated) RX ORDER — OXYCODONE HYDROCHLORIDE 5 MG/1
TABLET ORAL
Status: DISPENSED
Start: 2024-05-16